# Patient Record
Sex: FEMALE | Race: WHITE | NOT HISPANIC OR LATINO | Employment: OTHER | URBAN - METROPOLITAN AREA
[De-identification: names, ages, dates, MRNs, and addresses within clinical notes are randomized per-mention and may not be internally consistent; named-entity substitution may affect disease eponyms.]

---

## 2020-11-11 PROCEDURE — 88305 TISSUE EXAM BY PATHOLOGIST: CPT | Performed by: OTOLARYNGOLOGY

## 2020-11-12 ENCOUNTER — LAB REQUISITION (OUTPATIENT)
Dept: LAB | Facility: HOSPITAL | Age: 75
End: 2020-11-12

## 2020-11-12 DIAGNOSIS — Z00.00 ENCOUNTER FOR GENERAL ADULT MEDICAL EXAMINATION WITHOUT ABNORMAL FINDINGS: ICD-10-CM

## 2020-11-13 LAB
LAB AP CASE REPORT: NORMAL
LAB AP CLINICAL INFORMATION: NORMAL
PATH REPORT.FINAL DX SPEC: NORMAL
PATH REPORT.GROSS SPEC: NORMAL

## 2021-05-20 ENCOUNTER — TRANSCRIBE ORDERS (OUTPATIENT)
Dept: ADMINISTRATIVE | Facility: HOSPITAL | Age: 76
End: 2021-05-20

## 2021-05-20 ENCOUNTER — APPOINTMENT (OUTPATIENT)
Dept: VASCULAR SURGERY | Facility: HOSPITAL | Age: 76
End: 2021-05-20

## 2021-05-20 DIAGNOSIS — I65.29 CAROTID ARTERY STENOSIS, UNILATERAL: ICD-10-CM

## 2021-05-20 DIAGNOSIS — I77.1 SUBCLAVIAN ARTERY STENOSIS, LEFT (HCC): Primary | ICD-10-CM

## 2021-05-20 PROCEDURE — G0463 HOSPITAL OUTPT CLINIC VISIT: HCPCS

## 2021-06-01 ENCOUNTER — HOSPITAL ENCOUNTER (OUTPATIENT)
Dept: CARDIOLOGY | Facility: HOSPITAL | Age: 76
Discharge: HOME | End: 2021-06-01

## 2021-06-01 ENCOUNTER — HOSPITAL ENCOUNTER (OUTPATIENT)
Dept: CT IMAGING | Facility: HOSPITAL | Age: 76
Discharge: HOME | End: 2021-06-01

## 2021-06-01 DIAGNOSIS — I65.29 CAROTID ARTERY STENOSIS, UNILATERAL: ICD-10-CM

## 2021-06-01 DIAGNOSIS — I77.1 SUBCLAVIAN ARTERY STENOSIS, LEFT (HCC): ICD-10-CM

## 2021-06-01 LAB
BH CV XLRA MEAS LEFT BULB EDV: -27.7 CM/SEC
BH CV XLRA MEAS LEFT BULB PSV: -246 CM/SEC
BH CV XLRA MEAS LEFT CCA RATIO VEL: 220 CM/SEC
BH CV XLRA MEAS LEFT DIST CCA EDV: 19.5 CM/SEC
BH CV XLRA MEAS LEFT DIST CCA PSV: 200 CM/SEC
BH CV XLRA MEAS LEFT DIST ICA EDV: -32.9 CM/SEC
BH CV XLRA MEAS LEFT DIST ICA PSV: -148 CM/SEC
BH CV XLRA MEAS LEFT ICA RATIO VEL: -307 CM/SEC
BH CV XLRA MEAS LEFT ICA/CCA RATIO: -1.4
BH CV XLRA MEAS LEFT PROX CCA EDV: 22 CM/SEC
BH CV XLRA MEAS LEFT PROX CCA PSV: 220 CM/SEC
BH CV XLRA MEAS LEFT PROX ECA PSV: -192 CM/SEC
BH CV XLRA MEAS LEFT PROX ICA EDV: -39.5 CM/SEC
BH CV XLRA MEAS LEFT PROX ICA PSV: -307 CM/SEC
BH CV XLRA MEAS LEFT PROX SCLA PSV: 168 CM/SEC
BH CV XLRA MEAS LEFT VERTEBRAL A PSV: -51.7 CM/SEC
BH CV XLRA MEAS RIGHT CCA RATIO VEL: 85.1 CM/SEC
BH CV XLRA MEAS RIGHT DIST CCA EDV: -11.5 CM/SEC
BH CV XLRA MEAS RIGHT DIST CCA PSV: -63.1 CM/SEC
BH CV XLRA MEAS RIGHT DIST ICA EDV: -16.8 CM/SEC
BH CV XLRA MEAS RIGHT DIST ICA PSV: -72.2 CM/SEC
BH CV XLRA MEAS RIGHT ICA RATIO VEL: -299 CM/SEC
BH CV XLRA MEAS RIGHT ICA/CCA RATIO: -3.5
BH CV XLRA MEAS RIGHT PROX CCA EDV: 13.7 CM/SEC
BH CV XLRA MEAS RIGHT PROX CCA PSV: 85.1 CM/SEC
BH CV XLRA MEAS RIGHT PROX ECA EDV: -63.1 CM/SEC
BH CV XLRA MEAS RIGHT PROX ECA PSV: -562 CM/SEC
BH CV XLRA MEAS RIGHT PROX ICA EDV: -54.9 CM/SEC
BH CV XLRA MEAS RIGHT PROX ICA PSV: -299 CM/SEC
BH CV XLRA MEAS RIGHT PROX SCLA EDV: 0 CM/SEC
BH CV XLRA MEAS RIGHT PROX SCLA PSV: 294 CM/SEC
BH CV XLRA MEAS RIGHT VERTEBRAL A EDV: -8.8 CM/SEC
BH CV XLRA MEAS RIGHT VERTEBRAL A PSV: -60.6 CM/SEC
CREAT BLDA-MCNC: 0.7 MG/DL (ref 0.6–1.3)
LEFT ARM BP: NORMAL MMHG
MAXIMAL PREDICTED HEART RATE: 144 BPM
RIGHT ARM BP: NORMAL MMHG
STRESS TARGET HR: 122 BPM

## 2021-06-01 PROCEDURE — 93880 EXTRACRANIAL BILAT STUDY: CPT

## 2021-06-01 PROCEDURE — 73206 CT ANGIO UPR EXTRM W/O&W/DYE: CPT

## 2021-06-01 PROCEDURE — 0 IOPAMIDOL PER 1 ML: Performed by: SURGERY

## 2021-06-01 PROCEDURE — 82565 ASSAY OF CREATININE: CPT

## 2021-06-01 RX ADMIN — IOPAMIDOL 100 ML: 755 INJECTION, SOLUTION INTRAVENOUS at 13:57

## 2021-06-08 ENCOUNTER — TELEPHONE (OUTPATIENT)
Dept: SURGERY | Facility: CLINIC | Age: 76
End: 2021-06-08

## 2021-06-08 NOTE — TELEPHONE ENCOUNTER
This nurse called patient to see if they were going to make their new patient appointment with [facility]. Patient stated that she needed to cancel it and will be seeking care from a different doctor relating to a diagnosis change. Will set up new appointment if need for patient.

## 2021-06-10 ENCOUNTER — APPOINTMENT (OUTPATIENT)
Dept: VASCULAR SURGERY | Facility: HOSPITAL | Age: 76
End: 2021-06-10

## 2021-06-10 PROCEDURE — G0463 HOSPITAL OUTPT CLINIC VISIT: HCPCS

## 2021-06-18 ENCOUNTER — HOSPITAL ENCOUNTER (OUTPATIENT)
Dept: CARDIOLOGY | Facility: HOSPITAL | Age: 76
Discharge: HOME | End: 2021-06-18

## 2021-06-18 ENCOUNTER — HOSPITAL ENCOUNTER (OUTPATIENT)
Dept: GENERAL RADIOLOGY | Facility: HOSPITAL | Age: 76
Discharge: HOME | End: 2021-06-18

## 2021-06-18 ENCOUNTER — LAB (OUTPATIENT)
Dept: LAB | Facility: HOSPITAL | Age: 76
End: 2021-06-18

## 2021-06-18 LAB
ABO GROUP BLD: NORMAL
ANION GAP SERPL CALCULATED.3IONS-SCNC: 13.5 MMOL/L (ref 5–15)
APTT PPP: 23.1 SECONDS (ref 24–31)
BLD GP AB SCN SERPL QL: NEGATIVE
BUN SERPL-MCNC: 7 MG/DL (ref 8–23)
BUN/CREAT SERPL: 9.7 (ref 7–25)
CALCIUM SPEC-SCNC: 9.3 MG/DL (ref 8.6–10.5)
CHLORIDE SERPL-SCNC: 99 MMOL/L (ref 98–107)
CO2 SERPL-SCNC: 21.5 MMOL/L (ref 22–29)
CREAT SERPL-MCNC: 0.72 MG/DL (ref 0.57–1)
DEPRECATED RDW RBC AUTO: 44.1 FL (ref 37–54)
ERYTHROCYTE [DISTWIDTH] IN BLOOD BY AUTOMATED COUNT: 13.2 % (ref 12.3–15.4)
GFR SERPL CREATININE-BSD FRML MDRD: 79 ML/MIN/1.73
GLUCOSE SERPL-MCNC: 86 MG/DL (ref 65–99)
HCT VFR BLD AUTO: 34.7 % (ref 34–46.6)
HGB BLD-MCNC: 11.4 G/DL (ref 12–15.9)
INR PPP: 1.03 (ref 0.93–1.1)
MCH RBC QN AUTO: 30 PG (ref 26.6–33)
MCHC RBC AUTO-ENTMCNC: 32.9 G/DL (ref 31.5–35.7)
MCV RBC AUTO: 91.3 FL (ref 79–97)
PLATELET # BLD AUTO: 419 10*3/MM3 (ref 140–450)
PMV BLD AUTO: 10.5 FL (ref 6–12)
POTASSIUM SERPL-SCNC: 4.9 MMOL/L (ref 3.5–5.2)
PROTHROMBIN TIME: 11.4 SECONDS (ref 9.6–11.7)
QT INTERVAL: 428 MS
RBC # BLD AUTO: 3.8 10*6/MM3 (ref 3.77–5.28)
RH BLD: POSITIVE
SODIUM SERPL-SCNC: 134 MMOL/L (ref 136–145)
T&S EXPIRATION DATE: NORMAL
WBC # BLD AUTO: 8.51 10*3/MM3 (ref 3.4–10.8)

## 2021-06-18 PROCEDURE — 85610 PROTHROMBIN TIME: CPT

## 2021-06-18 PROCEDURE — 93010 ELECTROCARDIOGRAM REPORT: CPT | Performed by: INTERNAL MEDICINE

## 2021-06-18 PROCEDURE — 86901 BLOOD TYPING SEROLOGIC RH(D): CPT

## 2021-06-18 PROCEDURE — 86900 BLOOD TYPING SEROLOGIC ABO: CPT

## 2021-06-18 PROCEDURE — 85730 THROMBOPLASTIN TIME PARTIAL: CPT

## 2021-06-18 PROCEDURE — 71046 X-RAY EXAM CHEST 2 VIEWS: CPT

## 2021-06-18 PROCEDURE — 86850 RBC ANTIBODY SCREEN: CPT

## 2021-06-18 PROCEDURE — 93005 ELECTROCARDIOGRAM TRACING: CPT | Performed by: SURGERY

## 2021-06-18 PROCEDURE — 85027 COMPLETE CBC AUTOMATED: CPT

## 2021-06-18 PROCEDURE — 80048 BASIC METABOLIC PNL TOTAL CA: CPT

## 2021-06-22 RX ORDER — PHENOL 1.4 %
600 AEROSOL, SPRAY (ML) MUCOUS MEMBRANE DAILY
Status: ON HOLD | COMMUNITY
End: 2022-08-16

## 2021-06-22 RX ORDER — ALBUTEROL SULFATE 90 UG/1
2 AEROSOL, METERED RESPIRATORY (INHALATION) EVERY 4 HOURS PRN
COMMUNITY

## 2021-06-22 RX ORDER — LOSARTAN POTASSIUM 25 MG/1
25 TABLET ORAL EVERY MORNING
COMMUNITY

## 2021-06-22 RX ORDER — EZETIMIBE 10 MG/1
10 TABLET ORAL
COMMUNITY

## 2021-06-22 RX ORDER — TOCILIZUMAB 180 MG/ML
1 INJECTION, SOLUTION SUBCUTANEOUS
Status: ON HOLD | COMMUNITY
End: 2022-08-18 | Stop reason: SDUPTHER

## 2021-06-22 RX ORDER — LEVOTHYROXINE SODIUM 0.03 MG/1
25 TABLET ORAL EVERY MORNING
COMMUNITY

## 2021-06-22 RX ORDER — ASPIRIN 81 MG/1
81 TABLET ORAL DAILY
COMMUNITY

## 2021-06-22 RX ORDER — HYDRALAZINE HYDROCHLORIDE 100 MG/1
100 TABLET, FILM COATED ORAL 3 TIMES DAILY
COMMUNITY

## 2021-06-22 RX ORDER — FLUTICASONE PROPIONATE 50 MCG
2 SPRAY, SUSPENSION (ML) NASAL EVERY MORNING
COMMUNITY

## 2021-06-22 RX ORDER — MULTIVIT-MIN/IRON/FOLIC ACID/K 18-600-40
2000 CAPSULE ORAL DAILY
COMMUNITY

## 2021-06-22 RX ORDER — FLUCONAZOLE 200 MG/1
200 TABLET ORAL
COMMUNITY
End: 2021-07-18

## 2021-06-22 RX ORDER — LOTEPREDNOL ETABONATE 5 MG/ML
1 SUSPENSION/ DROPS OPHTHALMIC
Status: ON HOLD | COMMUNITY
End: 2022-08-16

## 2021-06-22 RX ORDER — GABAPENTIN 300 MG/1
300 CAPSULE ORAL 3 TIMES DAILY
COMMUNITY
End: 2021-07-22 | Stop reason: HOSPADM

## 2021-06-22 RX ORDER — SIMVASTATIN 40 MG
40 TABLET ORAL NIGHTLY
COMMUNITY

## 2021-06-22 RX ORDER — ESCITALOPRAM OXALATE 10 MG/1
10 TABLET ORAL EVERY MORNING
COMMUNITY

## 2021-06-22 RX ORDER — OMEPRAZOLE 20 MG/1
20 CAPSULE, DELAYED RELEASE ORAL EVERY MORNING
Status: ON HOLD | COMMUNITY
End: 2022-08-16

## 2021-06-22 RX ORDER — MONTELUKAST SODIUM 10 MG/1
10 TABLET ORAL NIGHTLY
COMMUNITY

## 2021-06-22 RX ORDER — BUDESONIDE AND FORMOTEROL FUMARATE DIHYDRATE 160; 4.5 UG/1; UG/1
2 AEROSOL RESPIRATORY (INHALATION)
COMMUNITY

## 2021-06-22 RX ORDER — CYCLOSPORINE 0.5 MG/ML
1 EMULSION OPHTHALMIC 2 TIMES DAILY
COMMUNITY

## 2021-06-22 RX ORDER — DIGOXIN 250 MCG
250 TABLET ORAL
COMMUNITY

## 2021-06-22 RX ORDER — FUROSEMIDE 20 MG/1
10 TABLET ORAL EVERY MORNING
COMMUNITY

## 2021-06-22 RX ORDER — AZELASTINE 1 MG/ML
1 SPRAY, METERED NASAL 2 TIMES DAILY
COMMUNITY

## 2021-06-22 RX ORDER — METOPROLOL SUCCINATE 50 MG/1
50 TABLET, EXTENDED RELEASE ORAL EVERY MORNING
COMMUNITY

## 2021-06-22 RX ORDER — NIFEDIPINE 30 MG/1
60 TABLET, EXTENDED RELEASE ORAL 2 TIMES DAILY
COMMUNITY

## 2021-06-28 ENCOUNTER — LAB (OUTPATIENT)
Dept: LAB | Facility: HOSPITAL | Age: 76
End: 2021-06-28

## 2021-06-28 LAB — SARS-COV-2 ORF1AB RESP QL NAA+PROBE: NOT DETECTED

## 2021-06-28 PROCEDURE — C9803 HOPD COVID-19 SPEC COLLECT: HCPCS

## 2021-06-28 PROCEDURE — U0005 INFEC AGEN DETEC AMPLI PROBE: HCPCS

## 2021-06-28 PROCEDURE — U0004 COV-19 TEST NON-CDC HGH THRU: HCPCS

## 2021-06-29 ENCOUNTER — ANESTHESIA EVENT (OUTPATIENT)
Dept: PERIOP | Facility: HOSPITAL | Age: 76
End: 2021-06-29

## 2021-06-30 ENCOUNTER — HOSPITAL ENCOUNTER (INPATIENT)
Facility: HOSPITAL | Age: 76
LOS: 5 days | Discharge: HOME HEALTH | End: 2021-07-05
Attending: SURGERY | Admitting: SURGERY

## 2021-06-30 ENCOUNTER — ANESTHESIA (OUTPATIENT)
Dept: PERIOP | Facility: HOSPITAL | Age: 76
End: 2021-06-30

## 2021-06-30 DIAGNOSIS — J44.9 CHRONIC OBSTRUCTIVE PULMONARY DISEASE, UNSPECIFIED COPD TYPE (HCC): ICD-10-CM

## 2021-06-30 DIAGNOSIS — G45.8 SUBCLAVIAN STEAL SYNDROME OF LEFT SUBCLAVIAN ARTERY: Primary | ICD-10-CM

## 2021-06-30 PROBLEM — G62.9 PERIPHERAL NEUROPATHY: Chronic | Status: ACTIVE | Noted: 2021-06-30

## 2021-06-30 PROBLEM — I10 ESSENTIAL HYPERTENSION: Status: ACTIVE | Noted: 2021-06-30

## 2021-06-30 PROBLEM — I48.91 ATRIAL FIBRILLATION (HCC): Chronic | Status: ACTIVE | Noted: 2021-06-30

## 2021-06-30 PROBLEM — J45.909 ASTHMA: Status: ACTIVE | Noted: 2021-06-30

## 2021-06-30 PROBLEM — I48.91 ATRIAL FIBRILLATION (HCC): Status: ACTIVE | Noted: 2021-06-30

## 2021-06-30 PROBLEM — M35.3 POLYMYALGIA (HCC): Status: ACTIVE | Noted: 2021-06-30

## 2021-06-30 PROBLEM — F41.9 ANXIETY: Status: ACTIVE | Noted: 2021-06-30

## 2021-06-30 PROBLEM — E78.5 HYPERLIPEMIA: Status: ACTIVE | Noted: 2021-06-30

## 2021-06-30 PROBLEM — I65.29 STENOSIS OF CAROTID ARTERY: Status: ACTIVE | Noted: 2021-06-30

## 2021-06-30 PROBLEM — K21.9 GERD (GASTROESOPHAGEAL REFLUX DISEASE): Status: ACTIVE | Noted: 2021-06-30

## 2021-06-30 PROBLEM — F32.A DEPRESSION: Status: ACTIVE | Noted: 2021-06-30

## 2021-06-30 PROBLEM — E03.9 HYPOTHYROID: Chronic | Status: ACTIVE | Noted: 2021-06-30

## 2021-06-30 PROBLEM — K21.9 GERD (GASTROESOPHAGEAL REFLUX DISEASE): Chronic | Status: ACTIVE | Noted: 2021-06-30

## 2021-06-30 PROBLEM — F41.9 ANXIETY: Chronic | Status: ACTIVE | Noted: 2021-06-30

## 2021-06-30 PROBLEM — I10 ESSENTIAL HYPERTENSION: Chronic | Status: ACTIVE | Noted: 2021-06-30

## 2021-06-30 LAB
ACT BLD: 114 SECONDS (ref 89–137)
ACT BLD: 114 SECONDS (ref 89–137)
ACT BLD: 186 SECONDS (ref 89–137)
ACT BLD: 230 SECONDS (ref 89–137)
ALBUMIN SERPL-MCNC: 4 G/DL (ref 3.5–5.2)
ALBUMIN/GLOB SERPL: 2.4 G/DL
ALP SERPL-CCNC: 29 U/L (ref 39–117)
ALT SERPL W P-5'-P-CCNC: 93 U/L (ref 1–33)
ANION GAP SERPL CALCULATED.3IONS-SCNC: 12 MMOL/L (ref 5–15)
AST SERPL-CCNC: 148 U/L (ref 1–32)
BASOPHILS # BLD AUTO: 0 10*3/MM3 (ref 0–0.2)
BASOPHILS NFR BLD AUTO: 0.4 % (ref 0–1.5)
BILIRUB SERPL-MCNC: 0.4 MG/DL (ref 0–1.2)
BUN SERPL-MCNC: 9 MG/DL (ref 8–23)
BUN/CREAT SERPL: 12.3 (ref 7–25)
CALCIUM SPEC-SCNC: 8.5 MG/DL (ref 8.6–10.5)
CHLORIDE SERPL-SCNC: 99 MMOL/L (ref 98–107)
CO2 SERPL-SCNC: 23 MMOL/L (ref 22–29)
CREAT SERPL-MCNC: 0.73 MG/DL (ref 0.57–1)
DEPRECATED RDW RBC AUTO: 42.4 FL (ref 37–54)
EOSINOPHIL # BLD AUTO: 0 10*3/MM3 (ref 0–0.4)
EOSINOPHIL NFR BLD AUTO: 0 % (ref 0.3–6.2)
ERYTHROCYTE [DISTWIDTH] IN BLOOD BY AUTOMATED COUNT: 13.8 % (ref 12.3–15.4)
GFR SERPL CREATININE-BSD FRML MDRD: 78 ML/MIN/1.73
GLOBULIN UR ELPH-MCNC: 1.7 GM/DL
GLUCOSE SERPL-MCNC: 168 MG/DL (ref 65–99)
HCT VFR BLD AUTO: 29.7 % (ref 34–46.6)
HGB BLD-MCNC: 10.1 G/DL (ref 12–15.9)
LYMPHOCYTES # BLD AUTO: 0.7 10*3/MM3 (ref 0.7–3.1)
LYMPHOCYTES NFR BLD AUTO: 6.7 % (ref 19.6–45.3)
MAGNESIUM SERPL-MCNC: 1.4 MG/DL (ref 1.6–2.4)
MCH RBC QN AUTO: 29.6 PG (ref 26.6–33)
MCHC RBC AUTO-ENTMCNC: 33.9 G/DL (ref 31.5–35.7)
MCV RBC AUTO: 87.2 FL (ref 79–97)
MONOCYTES # BLD AUTO: 0.2 10*3/MM3 (ref 0.1–0.9)
MONOCYTES NFR BLD AUTO: 1.7 % (ref 5–12)
NEUTROPHILS NFR BLD AUTO: 9.2 10*3/MM3 (ref 1.7–7)
NEUTROPHILS NFR BLD AUTO: 91.2 % (ref 42.7–76)
NRBC BLD AUTO-RTO: 0.1 /100 WBC (ref 0–0.2)
PHOSPHATE SERPL-MCNC: 4.2 MG/DL (ref 2.5–4.5)
PLATELET # BLD AUTO: 262 10*3/MM3 (ref 140–450)
PMV BLD AUTO: 7.8 FL (ref 6–12)
POTASSIUM SERPL-SCNC: 4.4 MMOL/L (ref 3.5–5.2)
PROT SERPL-MCNC: 5.7 G/DL (ref 6–8.5)
RBC # BLD AUTO: 3.4 10*6/MM3 (ref 3.77–5.28)
SODIUM SERPL-SCNC: 134 MMOL/L (ref 136–145)
WBC # BLD AUTO: 10.1 10*3/MM3 (ref 3.4–10.8)

## 2021-06-30 PROCEDURE — 80053 COMPREHEN METABOLIC PANEL: CPT | Performed by: NURSE PRACTITIONER

## 2021-06-30 PROCEDURE — 25010000002 NEOSTIGMINE 5 MG/5ML SOLUTION: Performed by: NURSE ANESTHETIST, CERTIFIED REGISTERED

## 2021-06-30 PROCEDURE — 94799 UNLISTED PULMONARY SVC/PX: CPT

## 2021-06-30 PROCEDURE — C1889 IMPLANT/INSERT DEVICE, NOC: HCPCS | Performed by: SURGERY

## 2021-06-30 PROCEDURE — 25010000002 FENTANYL CITRATE (PF) 100 MCG/2ML SOLUTION: Performed by: NURSE ANESTHETIST, CERTIFIED REGISTERED

## 2021-06-30 PROCEDURE — 25010000002 HEPARIN (PORCINE) PER 1000 UNITS: Performed by: SURGERY

## 2021-06-30 PROCEDURE — 83735 ASSAY OF MAGNESIUM: CPT | Performed by: NURSE PRACTITIONER

## 2021-06-30 PROCEDURE — 25010000002 PROTAMINE SULFATE PER 10 MG: Performed by: NURSE ANESTHETIST, CERTIFIED REGISTERED

## 2021-06-30 PROCEDURE — 84100 ASSAY OF PHOSPHORUS: CPT | Performed by: NURSE PRACTITIONER

## 2021-06-30 PROCEDURE — 25010000002 ONDANSETRON PER 1 MG: Performed by: SURGERY

## 2021-06-30 PROCEDURE — 25010000002 HEPARIN (PORCINE) PER 1000 UNITS: Performed by: NURSE ANESTHETIST, CERTIFIED REGISTERED

## 2021-06-30 PROCEDURE — 85347 COAGULATION TIME ACTIVATED: CPT

## 2021-06-30 PROCEDURE — 25010000002 MAGNESIUM SULFATE 2 GM/50ML SOLUTION: Performed by: NURSE PRACTITIONER

## 2021-06-30 PROCEDURE — 85025 COMPLETE CBC W/AUTO DIFF WBC: CPT | Performed by: NURSE PRACTITIONER

## 2021-06-30 PROCEDURE — 25010000002 MORPHINE PER 10 MG: Performed by: SURGERY

## 2021-06-30 PROCEDURE — 25010000002 VANCOMYCIN 1 G RECONSTITUTED SOLUTION 1 EACH VIAL: Performed by: SURGERY

## 2021-06-30 PROCEDURE — 25010000002 PHENYLEPHRINE 10 MG/ML SOLUTION 5 ML VIAL: Performed by: NURSE ANESTHETIST, CERTIFIED REGISTERED

## 2021-06-30 PROCEDURE — C1768 GRAFT, VASCULAR: HCPCS | Performed by: SURGERY

## 2021-06-30 PROCEDURE — 25010000002 ONDANSETRON PER 1 MG: Performed by: NURSE ANESTHETIST, CERTIFIED REGISTERED

## 2021-06-30 PROCEDURE — 25010000002 FENTANYL CITRATE (PF) 50 MCG/ML SOLUTION: Performed by: NURSE ANESTHETIST, CERTIFIED REGISTERED

## 2021-06-30 PROCEDURE — 25010000002 PROPOFOL 10 MG/ML EMULSION: Performed by: NURSE ANESTHETIST, CERTIFIED REGISTERED

## 2021-06-30 PROCEDURE — 031J0JY BYPASS LEFT COMMON CAROTID ARTERY TO UPPER ARTERY WITH SYNTHETIC SUBSTITUTE, OPEN APPROACH: ICD-10-PCS | Performed by: SURGERY

## 2021-06-30 PROCEDURE — 25010000002 DEXAMETHASONE PER 1 MG: Performed by: NURSE ANESTHETIST, CERTIFIED REGISTERED

## 2021-06-30 DEVICE — LIGACLIP MCA MULTIPLE CLIP APPLIERS, 20 SMALL CLIPS
Type: IMPLANTABLE DEVICE | Site: CAROTID | Status: FUNCTIONAL
Brand: LIGACLIP

## 2021-06-30 DEVICE — FLOSEAL HEMOSTATIC MATRIX, 5ML
Type: IMPLANTABLE DEVICE | Site: NECK | Status: FUNCTIONAL
Brand: FLOSEAL HEMOSTATIC MATRIX

## 2021-06-30 DEVICE — LIGACLIP MCA MULTIPLE CLIP APPLIERS, 20 MEDIUM CLIPS
Type: IMPLANTABLE DEVICE | Site: CAROTID | Status: FUNCTIONAL
Brand: LIGACLIP

## 2021-06-30 DEVICE — GRFT VASC PROPAT THNSTRCH REMVRNG6X50X40: Type: IMPLANTABLE DEVICE | Site: CAROTID | Status: FUNCTIONAL

## 2021-06-30 RX ORDER — OXYCODONE HYDROCHLORIDE 5 MG/1
5 TABLET ORAL ONCE AS NEEDED
Status: COMPLETED | OUTPATIENT
Start: 2021-06-30 | End: 2021-06-30

## 2021-06-30 RX ORDER — ALBUTEROL SULFATE 2.5 MG/3ML
SOLUTION RESPIRATORY (INHALATION) AS NEEDED
Status: DISCONTINUED | OUTPATIENT
Start: 2021-06-30 | End: 2021-06-30 | Stop reason: SURG

## 2021-06-30 RX ORDER — NALOXONE HCL 0.4 MG/ML
0.4 VIAL (ML) INJECTION
Status: DISCONTINUED | OUTPATIENT
Start: 2021-06-30 | End: 2021-07-05 | Stop reason: HOSPADM

## 2021-06-30 RX ORDER — DEXAMETHASONE SODIUM PHOSPHATE 4 MG/ML
INJECTION, SOLUTION INTRA-ARTICULAR; INTRALESIONAL; INTRAMUSCULAR; INTRAVENOUS; SOFT TISSUE AS NEEDED
Status: DISCONTINUED | OUTPATIENT
Start: 2021-06-30 | End: 2021-06-30 | Stop reason: SURG

## 2021-06-30 RX ORDER — GLYCOPYRROLATE 1 MG/5 ML
SYRINGE (ML) INTRAVENOUS AS NEEDED
Status: DISCONTINUED | OUTPATIENT
Start: 2021-06-30 | End: 2021-06-30 | Stop reason: SURG

## 2021-06-30 RX ORDER — LIDOCAINE HYDROCHLORIDE 20 MG/ML
INJECTION, SOLUTION EPIDURAL; INFILTRATION; INTRACAUDAL; PERINEURAL AS NEEDED
Status: DISCONTINUED | OUTPATIENT
Start: 2021-06-30 | End: 2021-06-30 | Stop reason: SURG

## 2021-06-30 RX ORDER — MORPHINE SULFATE 4 MG/ML
2 INJECTION, SOLUTION INTRAMUSCULAR; INTRAVENOUS EVERY 4 HOURS PRN
Status: DISCONTINUED | OUTPATIENT
Start: 2021-06-30 | End: 2021-07-05 | Stop reason: HOSPADM

## 2021-06-30 RX ORDER — HEPARIN SODIUM 1000 [USP'U]/ML
INJECTION, SOLUTION INTRAVENOUS; SUBCUTANEOUS AS NEEDED
Status: DISCONTINUED | OUTPATIENT
Start: 2021-06-30 | End: 2021-06-30 | Stop reason: SURG

## 2021-06-30 RX ORDER — ONDANSETRON 2 MG/ML
INJECTION INTRAMUSCULAR; INTRAVENOUS AS NEEDED
Status: DISCONTINUED | OUTPATIENT
Start: 2021-06-30 | End: 2021-06-30 | Stop reason: SURG

## 2021-06-30 RX ORDER — ACETAMINOPHEN 325 MG/1
650 TABLET ORAL ONCE AS NEEDED
Status: DISCONTINUED | OUTPATIENT
Start: 2021-06-30 | End: 2021-06-30

## 2021-06-30 RX ORDER — PROPOFOL 10 MG/ML
VIAL (ML) INTRAVENOUS AS NEEDED
Status: DISCONTINUED | OUTPATIENT
Start: 2021-06-30 | End: 2021-06-30 | Stop reason: SURG

## 2021-06-30 RX ORDER — ALBUTEROL SULFATE 90 UG/1
2 AEROSOL, METERED RESPIRATORY (INHALATION) EVERY 4 HOURS PRN
Status: DISCONTINUED | OUTPATIENT
Start: 2021-06-30 | End: 2021-07-05 | Stop reason: HOSPADM

## 2021-06-30 RX ORDER — KETAMINE HYDROCHLORIDE 10 MG/ML
INJECTION INTRAMUSCULAR; INTRAVENOUS AS NEEDED
Status: DISCONTINUED | OUTPATIENT
Start: 2021-06-30 | End: 2021-06-30 | Stop reason: SURG

## 2021-06-30 RX ORDER — FENTANYL CITRATE 50 UG/ML
25 INJECTION, SOLUTION INTRAMUSCULAR; INTRAVENOUS
Status: DISCONTINUED | OUTPATIENT
Start: 2021-06-30 | End: 2021-06-30

## 2021-06-30 RX ORDER — ALUMINA, MAGNESIA, AND SIMETHICONE 2400; 2400; 240 MG/30ML; MG/30ML; MG/30ML
15 SUSPENSION ORAL EVERY 6 HOURS PRN
Status: DISCONTINUED | OUTPATIENT
Start: 2021-06-30 | End: 2021-07-02

## 2021-06-30 RX ORDER — ONDANSETRON 2 MG/ML
4 INJECTION INTRAMUSCULAR; INTRAVENOUS ONCE AS NEEDED
Status: DISCONTINUED | OUTPATIENT
Start: 2021-06-30 | End: 2021-06-30

## 2021-06-30 RX ORDER — HYDROCODONE BITARTRATE AND ACETAMINOPHEN 5; 325 MG/1; MG/1
1 TABLET ORAL EVERY 4 HOURS PRN
Status: DISCONTINUED | OUTPATIENT
Start: 2021-06-30 | End: 2021-07-05 | Stop reason: HOSPADM

## 2021-06-30 RX ORDER — MAGNESIUM SULFATE 1 G/100ML
1 INJECTION INTRAVENOUS AS NEEDED
Status: DISCONTINUED | OUTPATIENT
Start: 2021-06-30 | End: 2021-07-05 | Stop reason: HOSPADM

## 2021-06-30 RX ORDER — ACETAMINOPHEN 325 MG/1
650 TABLET ORAL EVERY 4 HOURS PRN
Status: DISCONTINUED | OUTPATIENT
Start: 2021-06-30 | End: 2021-07-05 | Stop reason: HOSPADM

## 2021-06-30 RX ORDER — ATORVASTATIN CALCIUM 10 MG/1
10 TABLET, FILM COATED ORAL NIGHTLY
Status: DISCONTINUED | OUTPATIENT
Start: 2021-06-30 | End: 2021-07-01

## 2021-06-30 RX ORDER — SODIUM CHLORIDE 0.9 % (FLUSH) 0.9 %
10 SYRINGE (ML) INJECTION AS NEEDED
Status: DISCONTINUED | OUTPATIENT
Start: 2021-06-30 | End: 2021-06-30 | Stop reason: HOSPADM

## 2021-06-30 RX ORDER — ROCURONIUM BROMIDE 10 MG/ML
INJECTION, SOLUTION INTRAVENOUS AS NEEDED
Status: DISCONTINUED | OUTPATIENT
Start: 2021-06-30 | End: 2021-06-30 | Stop reason: SURG

## 2021-06-30 RX ORDER — BUDESONIDE AND FORMOTEROL FUMARATE DIHYDRATE 160; 4.5 UG/1; UG/1
2 AEROSOL RESPIRATORY (INHALATION)
Status: DISCONTINUED | OUTPATIENT
Start: 2021-06-30 | End: 2021-07-05 | Stop reason: HOSPADM

## 2021-06-30 RX ORDER — PROTAMINE SULFATE 10 MG/ML
INJECTION, SOLUTION INTRAVENOUS AS NEEDED
Status: DISCONTINUED | OUTPATIENT
Start: 2021-06-30 | End: 2021-06-30 | Stop reason: SURG

## 2021-06-30 RX ORDER — FUROSEMIDE 20 MG/1
10 TABLET ORAL EVERY MORNING
Status: DISCONTINUED | OUTPATIENT
Start: 2021-06-30 | End: 2021-07-02

## 2021-06-30 RX ORDER — SODIUM CHLORIDE, SODIUM LACTATE, POTASSIUM CHLORIDE, CALCIUM CHLORIDE 600; 310; 30; 20 MG/100ML; MG/100ML; MG/100ML; MG/100ML
1000 INJECTION, SOLUTION INTRAVENOUS CONTINUOUS
Status: DISCONTINUED | OUTPATIENT
Start: 2021-06-30 | End: 2021-06-30

## 2021-06-30 RX ORDER — SODIUM CHLORIDE, SODIUM LACTATE, POTASSIUM CHLORIDE, CALCIUM CHLORIDE 600; 310; 30; 20 MG/100ML; MG/100ML; MG/100ML; MG/100ML
50 INJECTION, SOLUTION INTRAVENOUS CONTINUOUS
Status: DISCONTINUED | OUTPATIENT
Start: 2021-06-30 | End: 2021-07-02

## 2021-06-30 RX ORDER — MAGNESIUM SULFATE HEPTAHYDRATE 40 MG/ML
2 INJECTION, SOLUTION INTRAVENOUS AS NEEDED
Status: DISCONTINUED | OUTPATIENT
Start: 2021-06-30 | End: 2021-07-05 | Stop reason: HOSPADM

## 2021-06-30 RX ORDER — LABETALOL HYDROCHLORIDE 5 MG/ML
10 INJECTION, SOLUTION INTRAVENOUS EVERY 4 HOURS PRN
Status: DISCONTINUED | OUTPATIENT
Start: 2021-06-30 | End: 2021-07-05 | Stop reason: HOSPADM

## 2021-06-30 RX ORDER — NEOSTIGMINE METHYLSULFATE 5 MG/5 ML
SYRINGE (ML) INTRAVENOUS AS NEEDED
Status: DISCONTINUED | OUTPATIENT
Start: 2021-06-30 | End: 2021-06-30 | Stop reason: SURG

## 2021-06-30 RX ORDER — METOPROLOL SUCCINATE 50 MG/1
100 TABLET, EXTENDED RELEASE ORAL EVERY MORNING
Status: DISCONTINUED | OUTPATIENT
Start: 2021-07-01 | End: 2021-07-05 | Stop reason: HOSPADM

## 2021-06-30 RX ORDER — LOSARTAN POTASSIUM 50 MG/1
100 TABLET ORAL EVERY MORNING
Status: DISCONTINUED | OUTPATIENT
Start: 2021-07-01 | End: 2021-07-05 | Stop reason: HOSPADM

## 2021-06-30 RX ORDER — POTASSIUM CHLORIDE 1.5 G/1.77G
40 POWDER, FOR SOLUTION ORAL AS NEEDED
Status: DISCONTINUED | OUTPATIENT
Start: 2021-06-30 | End: 2021-07-05 | Stop reason: HOSPADM

## 2021-06-30 RX ORDER — NITROGLYCERIN 0.4 MG/1
0.4 TABLET SUBLINGUAL
Status: DISCONTINUED | OUTPATIENT
Start: 2021-06-30 | End: 2021-07-05 | Stop reason: HOSPADM

## 2021-06-30 RX ORDER — ASPIRIN 325 MG
325 TABLET ORAL DAILY
Status: DISCONTINUED | OUTPATIENT
Start: 2021-07-01 | End: 2021-07-05 | Stop reason: HOSPADM

## 2021-06-30 RX ORDER — HYDRALAZINE HYDROCHLORIDE 25 MG/1
100 TABLET, FILM COATED ORAL 3 TIMES DAILY
Status: DISCONTINUED | OUTPATIENT
Start: 2021-06-30 | End: 2021-07-05 | Stop reason: HOSPADM

## 2021-06-30 RX ORDER — POTASSIUM CHLORIDE 20 MEQ/1
40 TABLET, EXTENDED RELEASE ORAL AS NEEDED
Status: DISCONTINUED | OUTPATIENT
Start: 2021-06-30 | End: 2021-07-05 | Stop reason: HOSPADM

## 2021-06-30 RX ORDER — DIGOXIN 250 MCG
250 TABLET ORAL NIGHTLY
Status: DISCONTINUED | OUTPATIENT
Start: 2021-06-30 | End: 2021-07-05 | Stop reason: HOSPADM

## 2021-06-30 RX ORDER — ESCITALOPRAM OXALATE 10 MG/1
10 TABLET ORAL EVERY MORNING
Status: DISCONTINUED | OUTPATIENT
Start: 2021-07-01 | End: 2021-07-05 | Stop reason: HOSPADM

## 2021-06-30 RX ORDER — ACETAMINOPHEN 650 MG/1
650 SUPPOSITORY RECTAL ONCE AS NEEDED
Status: DISCONTINUED | OUTPATIENT
Start: 2021-06-30 | End: 2021-06-30

## 2021-06-30 RX ORDER — GABAPENTIN 300 MG/1
300 CAPSULE ORAL 3 TIMES DAILY
Status: DISCONTINUED | OUTPATIENT
Start: 2021-06-30 | End: 2021-07-05 | Stop reason: HOSPADM

## 2021-06-30 RX ORDER — EPHEDRINE SULFATE 50 MG/ML
INJECTION INTRAVENOUS AS NEEDED
Status: DISCONTINUED | OUTPATIENT
Start: 2021-06-30 | End: 2021-06-30 | Stop reason: SURG

## 2021-06-30 RX ORDER — ONDANSETRON 4 MG/1
4 TABLET, FILM COATED ORAL EVERY 6 HOURS PRN
Status: DISCONTINUED | OUTPATIENT
Start: 2021-06-30 | End: 2021-07-02

## 2021-06-30 RX ORDER — LEVOTHYROXINE SODIUM 0.03 MG/1
25 TABLET ORAL
Status: DISCONTINUED | OUTPATIENT
Start: 2021-07-01 | End: 2021-07-05 | Stop reason: HOSPADM

## 2021-06-30 RX ORDER — ONDANSETRON 2 MG/ML
4 INJECTION INTRAMUSCULAR; INTRAVENOUS EVERY 6 HOURS PRN
Status: DISCONTINUED | OUTPATIENT
Start: 2021-06-30 | End: 2021-07-02

## 2021-06-30 RX ORDER — PANTOPRAZOLE SODIUM 40 MG/1
40 TABLET, DELAYED RELEASE ORAL EVERY MORNING
Status: DISCONTINUED | OUTPATIENT
Start: 2021-07-01 | End: 2021-07-05 | Stop reason: HOSPADM

## 2021-06-30 RX ORDER — FENTANYL CITRATE 50 UG/ML
INJECTION, SOLUTION INTRAMUSCULAR; INTRAVENOUS AS NEEDED
Status: DISCONTINUED | OUTPATIENT
Start: 2021-06-30 | End: 2021-06-30 | Stop reason: SURG

## 2021-06-30 RX ORDER — POTASSIUM CHLORIDE 7.45 MG/ML
10 INJECTION INTRAVENOUS
Status: DISCONTINUED | OUTPATIENT
Start: 2021-06-30 | End: 2021-07-05 | Stop reason: HOSPADM

## 2021-06-30 RX ORDER — NIFEDIPINE 60 MG/1
60 TABLET, EXTENDED RELEASE ORAL
Status: DISCONTINUED | OUTPATIENT
Start: 2021-07-01 | End: 2021-07-05 | Stop reason: HOSPADM

## 2021-06-30 RX ORDER — MONTELUKAST SODIUM 10 MG/1
10 TABLET ORAL NIGHTLY
Status: DISCONTINUED | OUTPATIENT
Start: 2021-06-30 | End: 2021-07-05 | Stop reason: HOSPADM

## 2021-06-30 RX ADMIN — FENTANYL CITRATE 25 MCG: 50 INJECTION, SOLUTION INTRAMUSCULAR; INTRAVENOUS at 12:20

## 2021-06-30 RX ADMIN — DEXMEDETOMIDINE HYDROCHLORIDE 0.5 MCG/HR: 100 INJECTION, SOLUTION INTRAVENOUS at 09:35

## 2021-06-30 RX ADMIN — SODIUM CHLORIDE, POTASSIUM CHLORIDE, SODIUM LACTATE AND CALCIUM CHLORIDE 50 ML/HR: 600; 310; 30; 20 INJECTION, SOLUTION INTRAVENOUS at 12:49

## 2021-06-30 RX ADMIN — GABAPENTIN 300 MG: 300 CAPSULE ORAL at 15:00

## 2021-06-30 RX ADMIN — IPRATROPIUM BROMIDE 0.5 MG: 0.5 SOLUTION RESPIRATORY (INHALATION) at 18:59

## 2021-06-30 RX ADMIN — MORPHINE SULFATE 2 MG: 4 INJECTION INTRAVENOUS at 14:06

## 2021-06-30 RX ADMIN — VANCOMYCIN HYDROCHLORIDE 1000 MG: 1 INJECTION, POWDER, LYOPHILIZED, FOR SOLUTION INTRAVENOUS at 18:05

## 2021-06-30 RX ADMIN — DIGOXIN 250 MCG: 250 TABLET ORAL at 21:08

## 2021-06-30 RX ADMIN — MAGNESIUM SULFATE HEPTAHYDRATE 2 G: 40 INJECTION, SOLUTION INTRAVENOUS at 18:04

## 2021-06-30 RX ADMIN — Medication 1000 MG: at 08:53

## 2021-06-30 RX ADMIN — PROTAMINE SULFATE 50 MG: 10 INJECTION, SOLUTION INTRAVENOUS at 10:59

## 2021-06-30 RX ADMIN — BUDESONIDE AND FORMOTEROL FUMARATE DIHYDRATE 2 PUFF: 160; 4.5 AEROSOL RESPIRATORY (INHALATION) at 19:05

## 2021-06-30 RX ADMIN — Medication 0.6 MG: at 11:19

## 2021-06-30 RX ADMIN — KETAMINE HYDROCHLORIDE 15 MG: 10 INJECTION, SOLUTION INTRAMUSCULAR; INTRAVENOUS at 10:40

## 2021-06-30 RX ADMIN — FENTANYL CITRATE 25 MCG: 50 INJECTION, SOLUTION INTRAMUSCULAR; INTRAVENOUS at 09:58

## 2021-06-30 RX ADMIN — LIDOCAINE HYDROCHLORIDE 20 MG: 20 INJECTION, SOLUTION EPIDURAL; INFILTRATION; INTRACAUDAL; PERINEURAL at 11:06

## 2021-06-30 RX ADMIN — GABAPENTIN 300 MG: 300 CAPSULE ORAL at 21:08

## 2021-06-30 RX ADMIN — ONDANSETRON 4 MG: 2 INJECTION INTRAMUSCULAR; INTRAVENOUS at 21:09

## 2021-06-30 RX ADMIN — EPHEDRINE SULFATE 10 MG: 50 INJECTION INTRAVENOUS at 10:32

## 2021-06-30 RX ADMIN — REMIFENTANIL HYDROCHLORIDE 0.2 MCG/KG/MIN: 1 INJECTION, POWDER, LYOPHILIZED, FOR SOLUTION INTRAVENOUS at 09:35

## 2021-06-30 RX ADMIN — EPHEDRINE SULFATE 5 MG: 50 INJECTION INTRAVENOUS at 10:12

## 2021-06-30 RX ADMIN — ONDANSETRON 4 MG: 2 INJECTION INTRAMUSCULAR; INTRAVENOUS at 11:02

## 2021-06-30 RX ADMIN — PROPOFOL 150 MG: 10 INJECTION, EMULSION INTRAVENOUS at 08:50

## 2021-06-30 RX ADMIN — ONDANSETRON 4 MG: 2 INJECTION INTRAMUSCULAR; INTRAVENOUS at 15:01

## 2021-06-30 RX ADMIN — EPHEDRINE SULFATE 5 MG: 50 INJECTION INTRAVENOUS at 09:20

## 2021-06-30 RX ADMIN — HYDROCODONE BITARTRATE AND ACETAMINOPHEN 1 TABLET: 5; 325 TABLET ORAL at 15:02

## 2021-06-30 RX ADMIN — HYDRALAZINE HYDROCHLORIDE 100 MG: 25 TABLET, FILM COATED ORAL at 15:00

## 2021-06-30 RX ADMIN — OXYCODONE 5 MG: 5 TABLET ORAL at 19:10

## 2021-06-30 RX ADMIN — IPRATROPIUM BROMIDE 0.5 MG: 0.5 SOLUTION RESPIRATORY (INHALATION) at 15:06

## 2021-06-30 RX ADMIN — HYDROCODONE BITARTRATE AND ACETAMINOPHEN 1 TABLET: 5; 325 TABLET ORAL at 21:09

## 2021-06-30 RX ADMIN — SODIUM CHLORIDE, POTASSIUM CHLORIDE, SODIUM LACTATE AND CALCIUM CHLORIDE 1000 ML: 600; 310; 30; 20 INJECTION, SOLUTION INTRAVENOUS at 06:27

## 2021-06-30 RX ADMIN — FENTANYL CITRATE 50 MCG: 50 INJECTION, SOLUTION INTRAMUSCULAR; INTRAVENOUS at 08:50

## 2021-06-30 RX ADMIN — KETAMINE HYDROCHLORIDE 10 MG: 10 INJECTION, SOLUTION INTRAMUSCULAR; INTRAVENOUS at 10:37

## 2021-06-30 RX ADMIN — MONTELUKAST 10 MG: 10 TABLET, FILM COATED ORAL at 21:08

## 2021-06-30 RX ADMIN — ROCURONIUM BROMIDE 5 MG: 10 INJECTION INTRAVENOUS at 10:30

## 2021-06-30 RX ADMIN — VANCOMYCIN HYDROCHLORIDE 1000 MG: 1 INJECTION, POWDER, LYOPHILIZED, FOR SOLUTION INTRAVENOUS at 06:37

## 2021-06-30 RX ADMIN — Medication 3 MG: at 11:19

## 2021-06-30 RX ADMIN — LIDOCAINE HYDROCHLORIDE 70 MG: 20 INJECTION, SOLUTION EPIDURAL; INFILTRATION; INTRACAUDAL; PERINEURAL at 08:50

## 2021-06-30 RX ADMIN — ROCURONIUM BROMIDE 50 MG: 10 INJECTION INTRAVENOUS at 08:50

## 2021-06-30 RX ADMIN — FENTANYL CITRATE 25 MCG: 50 INJECTION, SOLUTION INTRAMUSCULAR; INTRAVENOUS at 12:30

## 2021-06-30 RX ADMIN — MORPHINE SULFATE 2 MG: 4 INJECTION INTRAVENOUS at 21:20

## 2021-06-30 RX ADMIN — ROCURONIUM BROMIDE 10 MG: 10 INJECTION INTRAVENOUS at 09:57

## 2021-06-30 RX ADMIN — ALBUTEROL SULFATE 2.5 MG: 2.5 SOLUTION RESPIRATORY (INHALATION) at 10:49

## 2021-06-30 RX ADMIN — PHENYLEPHRINE HYDROCHLORIDE 0.5 MCG/KG/MIN: 10 INJECTION INTRAVENOUS at 09:19

## 2021-06-30 RX ADMIN — EPHEDRINE SULFATE 5 MG: 50 INJECTION INTRAVENOUS at 09:33

## 2021-06-30 RX ADMIN — DEXAMETHASONE SODIUM PHOSPHATE 10 MG: 4 INJECTION, SOLUTION INTRAMUSCULAR; INTRAVENOUS at 09:53

## 2021-06-30 RX ADMIN — ATORVASTATIN CALCIUM 10 MG: 10 TABLET, FILM COATED ORAL at 21:08

## 2021-06-30 RX ADMIN — HYDRALAZINE HYDROCHLORIDE 100 MG: 25 TABLET, FILM COATED ORAL at 21:08

## 2021-06-30 RX ADMIN — HEPARIN SODIUM 8000 UNITS: 1000 INJECTION, SOLUTION INTRAVENOUS; SUBCUTANEOUS at 10:04

## 2021-06-30 RX ADMIN — FUROSEMIDE 10 MG: 20 TABLET ORAL at 14:06

## 2021-06-30 NOTE — ANESTHESIA PROCEDURE NOTES
Airway  Date/Time: 6/30/2021 8:52 AM  End Time:6/30/2021 8:52 AM    General Information and Staff    Patient location during procedure: OR  Anesthesiologist: Timur Guerrero MD  CRNA: Latonia Elizondo CRNA    Indications and Patient Condition  Indications for airway management: airway protection    Preoxygenated: yes  MILS not maintained throughout  Mask difficulty assessment: 1 - vent by mask    Final Airway Details  Final airway type: endotracheal airway      Successful airway: ETT    Successful intubation technique: video laryngoscopy  Facilitating devices/methods: intubating stylet  Endotracheal tube insertion site: oral  Blade: Glidescope  Blade size: 3  ETT size (mm): 7.0  Cormack-Lehane Classification: grade IIa - partial view of glottis  Placement verified by: chest auscultation and capnometry   Measured from: lips  ETT/EBT  to lips (cm): 21  Number of attempts at approach: 1  Assessment: lips, teeth, and gum same as pre-op and atraumatic intubation

## 2021-06-30 NOTE — ANESTHESIA POSTPROCEDURE EVALUATION
Patient: Gali Dover    Procedure Summary     Date: 06/30/21 Room / Location: [facility] OR [address] [place] [address] MAIN OR    Anesthesia Start: 0843 Anesthesia Stop: 1146    Procedure: CAROTID SUBCLAVIAN BYPASS (Left Neck) Diagnosis:       Subclavian artery stenosis (CMS/HCC)      (Subclavian artery stenosis (CMS/HCC) [I77.1])    Surgeons: Navid Hassan MD Provider: Timur Guerrero MD    Anesthesia Type: general ASA Status: 3          Anesthesia Type: general    Vitals  Vitals Value Taken Time   /46 06/30/21 1228   Temp 98.2 °F (36.8 °C) 06/30/21 1216   Pulse 74 06/30/21 1230   Resp 11 06/30/21 1222   SpO2 93 % 06/30/21 1230   Vitals shown include unvalidated device data.        Post Anesthesia Care and Evaluation    Patient location during evaluation: PACU  Patient participation: complete - patient participated  Level of consciousness: awake  Pain scale: See nurse's notes for pain score.  Pain management: adequate  Airway patency: patent  Anesthetic complications: No anesthetic complications  PONV Status: none  Cardiovascular status: acceptable  Respiratory status: acceptable  Hydration status: acceptable    Comments: Patient seen and examined postoperatively; vital signs stable; SpO2 greater than or equal to 90%; cardiopulmonary status stable; nausea/vomiting adequately controlled; pain adequately controlled; no apparent anesthesia complications; patient discharged from anesthesia care when discharge criteria were met

## 2021-06-30 NOTE — ANESTHESIA PREPROCEDURE EVALUATION
Anesthesia Evaluation     Patient summary reviewed and Nursing notes reviewed   NPO Solid Status: > 8 hours  NPO Liquid Status: > 8 hours           Airway   Mallampati: II  TM distance: >3 FB  Neck ROM: full  Possible difficult intubation  Dental - normal exam         Pulmonary - normal exam    breath sounds clear to auscultation  (+) COPD, asthma,  Cardiovascular - normal exam  Exercise tolerance: unable to assess    ECG reviewed  Rhythm: regular  Rate: normal    (+) hypertension, hyperlipidemia,       Neuro/Psych- negative ROS  GI/Hepatic/Renal/Endo    (+)  GERD,      Musculoskeletal (-) negative ROS    Abdominal  - normal exam   Substance History - negative use     OB/GYN negative ob/gyn ROS         Other - negative ROS                     Anesthesia Plan    ASA 3     general   (Art Line, Cerebral Ox, Glide scope)  intravenous induction     Anesthetic plan, all risks, benefits, and alternatives have been provided, discussed and informed consent has been obtained with: patient.

## 2021-07-01 PROBLEM — I48.0 PAROXYSMAL ATRIAL FIBRILLATION (HCC): Status: ACTIVE | Noted: 2017-09-14

## 2021-07-01 PROBLEM — E78.5 HYPERLIPIDEMIA: Chronic | Status: ACTIVE | Noted: 2021-06-30

## 2021-07-01 PROBLEM — G45.8 SUBCLAVIAN STEAL SYNDROME OF LEFT SUBCLAVIAN ARTERY: Status: ACTIVE | Noted: 2021-07-01

## 2021-07-01 PROBLEM — E66.3 OVERWEIGHT (BMI 25.0-29.9): Status: ACTIVE | Noted: 2021-07-01

## 2021-07-01 PROBLEM — J45.909 ASTHMA: Chronic | Status: ACTIVE | Noted: 2021-06-30

## 2021-07-01 PROBLEM — M35.3 POLYMYALGIA (HCC): Chronic | Status: ACTIVE | Noted: 2021-06-30

## 2021-07-01 PROBLEM — R42 DIZZINESS ON STANDING: Status: ACTIVE | Noted: 2020-11-17

## 2021-07-01 PROBLEM — F32.A DEPRESSION: Chronic | Status: ACTIVE | Noted: 2021-06-30

## 2021-07-01 PROBLEM — M25.561 PAIN IN RIGHT KNEE: Status: ACTIVE | Noted: 2021-02-10

## 2021-07-01 PROBLEM — I65.23 BILATERAL CAROTID ARTERY STENOSIS: Chronic | Status: ACTIVE | Noted: 2021-06-30

## 2021-07-01 PROBLEM — M31.6 GIANT CELL ARTERITIS (HCC): Status: ACTIVE | Noted: 2017-08-08

## 2021-07-01 PROBLEM — M17.11 PRIMARY OSTEOARTHRITIS OF RIGHT KNEE: Status: ACTIVE | Noted: 2021-02-10

## 2021-07-01 PROBLEM — I65.23 BILATERAL CAROTID ARTERY STENOSIS: Status: ACTIVE | Noted: 2021-06-30

## 2021-07-01 PROBLEM — I48.0 PAROXYSMAL ATRIAL FIBRILLATION (HCC): Chronic | Status: ACTIVE | Noted: 2017-09-14

## 2021-07-01 PROBLEM — D50.0 NORMOCYTIC ANEMIA DUE TO BLOOD LOSS: Status: ACTIVE | Noted: 2021-07-01

## 2021-07-01 PROBLEM — R74.01 TRANSAMINITIS: Status: ACTIVE | Noted: 2021-07-01

## 2021-07-01 LAB
ALBUMIN SERPL-MCNC: 3.8 G/DL (ref 3.5–5.2)
ALBUMIN/GLOB SERPL: 2.2 G/DL
ALP SERPL-CCNC: 34 U/L (ref 39–117)
ALT SERPL W P-5'-P-CCNC: 167 U/L (ref 1–33)
ANION GAP SERPL CALCULATED.3IONS-SCNC: 11 MMOL/L (ref 5–15)
AST SERPL-CCNC: 278 U/L (ref 1–32)
BASOPHILS # BLD AUTO: 0 10*3/MM3 (ref 0–0.2)
BASOPHILS NFR BLD AUTO: 0.4 % (ref 0–1.5)
BILIRUB SERPL-MCNC: 0.7 MG/DL (ref 0–1.2)
BUN SERPL-MCNC: 8 MG/DL (ref 8–23)
BUN/CREAT SERPL: 11.6 (ref 7–25)
CALCIUM SPEC-SCNC: 8 MG/DL (ref 8.6–10.5)
CHLORIDE SERPL-SCNC: 96 MMOL/L (ref 98–107)
CHOLEST SERPL-MCNC: 150 MG/DL (ref 0–200)
CO2 SERPL-SCNC: 23 MMOL/L (ref 22–29)
CREAT SERPL-MCNC: 0.69 MG/DL (ref 0.57–1)
DEPRECATED RDW RBC AUTO: 41.1 FL (ref 37–54)
DIGOXIN SERPL-MCNC: 1 NG/ML (ref 0.6–1.2)
EOSINOPHIL # BLD AUTO: 0 10*3/MM3 (ref 0–0.4)
EOSINOPHIL NFR BLD AUTO: 0.2 % (ref 0.3–6.2)
ERYTHROCYTE [DISTWIDTH] IN BLOOD BY AUTOMATED COUNT: 13.6 % (ref 12.3–15.4)
GFR SERPL CREATININE-BSD FRML MDRD: 83 ML/MIN/1.73
GLOBULIN UR ELPH-MCNC: 1.7 GM/DL
GLUCOSE SERPL-MCNC: 102 MG/DL (ref 65–99)
HCT VFR BLD AUTO: 26.5 % (ref 34–46.6)
HDLC SERPL-MCNC: 34 MG/DL (ref 40–60)
HGB BLD-MCNC: 9.1 G/DL (ref 12–15.9)
LDLC SERPL CALC-MCNC: 92 MG/DL (ref 0–100)
LDLC/HDLC SERPL: 2.64 {RATIO}
LYMPHOCYTES # BLD AUTO: 1.6 10*3/MM3 (ref 0.7–3.1)
LYMPHOCYTES NFR BLD AUTO: 18.1 % (ref 19.6–45.3)
MAGNESIUM SERPL-MCNC: 1.7 MG/DL (ref 1.6–2.4)
MCH RBC QN AUTO: 30 PG (ref 26.6–33)
MCHC RBC AUTO-ENTMCNC: 34.3 G/DL (ref 31.5–35.7)
MCV RBC AUTO: 87.3 FL (ref 79–97)
MONOCYTES # BLD AUTO: 1 10*3/MM3 (ref 0.1–0.9)
MONOCYTES NFR BLD AUTO: 11.2 % (ref 5–12)
NEUTROPHILS NFR BLD AUTO: 6.2 10*3/MM3 (ref 1.7–7)
NEUTROPHILS NFR BLD AUTO: 70.1 % (ref 42.7–76)
NRBC BLD AUTO-RTO: 0 /100 WBC (ref 0–0.2)
PHOSPHATE SERPL-MCNC: 3.7 MG/DL (ref 2.5–4.5)
PLATELET # BLD AUTO: 243 10*3/MM3 (ref 140–450)
PMV BLD AUTO: 8.2 FL (ref 6–12)
POTASSIUM SERPL-SCNC: 4.1 MMOL/L (ref 3.5–5.2)
PROT SERPL-MCNC: 5.5 G/DL (ref 6–8.5)
RBC # BLD AUTO: 3.03 10*6/MM3 (ref 3.77–5.28)
SODIUM SERPL-SCNC: 130 MMOL/L (ref 136–145)
TRIGL SERPL-MCNC: 131 MG/DL (ref 0–150)
VLDLC SERPL-MCNC: 24 MG/DL (ref 5–40)
WBC # BLD AUTO: 8.9 10*3/MM3 (ref 3.4–10.8)

## 2021-07-01 PROCEDURE — 80061 LIPID PANEL: CPT | Performed by: NURSE PRACTITIONER

## 2021-07-01 PROCEDURE — 25010000002 MORPHINE PER 10 MG: Performed by: SURGERY

## 2021-07-01 PROCEDURE — 94799 UNLISTED PULMONARY SVC/PX: CPT

## 2021-07-01 PROCEDURE — 63710000001 ONDANSETRON PER 8 MG: Performed by: NURSE PRACTITIONER

## 2021-07-01 PROCEDURE — 83735 ASSAY OF MAGNESIUM: CPT | Performed by: NURSE PRACTITIONER

## 2021-07-01 PROCEDURE — 25010000002 ONDANSETRON PER 1 MG: Performed by: SURGERY

## 2021-07-01 PROCEDURE — 85025 COMPLETE CBC W/AUTO DIFF WBC: CPT | Performed by: NURSE PRACTITIONER

## 2021-07-01 PROCEDURE — 99222 1ST HOSP IP/OBS MODERATE 55: CPT | Performed by: NURSE PRACTITIONER

## 2021-07-01 PROCEDURE — 63710000001 ONDANSETRON PER 8 MG: Performed by: SURGERY

## 2021-07-01 PROCEDURE — 80053 COMPREHEN METABOLIC PANEL: CPT | Performed by: NURSE PRACTITIONER

## 2021-07-01 PROCEDURE — 25010000002 ENOXAPARIN PER 10 MG: Performed by: NURSE PRACTITIONER

## 2021-07-01 PROCEDURE — 80162 ASSAY OF DIGOXIN TOTAL: CPT | Performed by: NURSE PRACTITIONER

## 2021-07-01 PROCEDURE — 84100 ASSAY OF PHOSPHORUS: CPT | Performed by: NURSE PRACTITIONER

## 2021-07-01 RX ADMIN — ONDANSETRON HYDROCHLORIDE 4 MG: 4 TABLET, FILM COATED ORAL at 22:23

## 2021-07-01 RX ADMIN — ASPIRIN 325 MG ORAL TABLET 325 MG: 325 PILL ORAL at 09:21

## 2021-07-01 RX ADMIN — DIGOXIN 250 MCG: 250 TABLET ORAL at 22:19

## 2021-07-01 RX ADMIN — MORPHINE SULFATE 2 MG: 4 INJECTION INTRAVENOUS at 02:44

## 2021-07-01 RX ADMIN — LEVOTHYROXINE SODIUM 25 MCG: 0.03 TABLET ORAL at 06:33

## 2021-07-01 RX ADMIN — HYDROCODONE BITARTRATE AND ACETAMINOPHEN 1 TABLET: 5; 325 TABLET ORAL at 02:12

## 2021-07-01 RX ADMIN — BUDESONIDE AND FORMOTEROL FUMARATE DIHYDRATE 2 PUFF: 160; 4.5 AEROSOL RESPIRATORY (INHALATION) at 18:20

## 2021-07-01 RX ADMIN — ONDANSETRON 4 MG: 2 INJECTION INTRAMUSCULAR; INTRAVENOUS at 02:35

## 2021-07-01 RX ADMIN — IPRATROPIUM BROMIDE 0.5 MG: 0.5 SOLUTION RESPIRATORY (INHALATION) at 18:20

## 2021-07-01 RX ADMIN — HYDRALAZINE HYDROCHLORIDE 100 MG: 25 TABLET, FILM COATED ORAL at 09:21

## 2021-07-01 RX ADMIN — METOPROLOL SUCCINATE 100 MG: 50 TABLET, EXTENDED RELEASE ORAL at 06:33

## 2021-07-01 RX ADMIN — LOSARTAN POTASSIUM 100 MG: 50 TABLET, FILM COATED ORAL at 06:33

## 2021-07-01 RX ADMIN — NIFEDIPINE 60 MG: 60 TABLET, FILM COATED, EXTENDED RELEASE ORAL at 09:21

## 2021-07-01 RX ADMIN — HYDROCODONE BITARTRATE AND ACETAMINOPHEN 1 TABLET: 5; 325 TABLET ORAL at 15:45

## 2021-07-01 RX ADMIN — HYDRALAZINE HYDROCHLORIDE 100 MG: 25 TABLET, FILM COATED ORAL at 15:39

## 2021-07-01 RX ADMIN — HYDROCODONE BITARTRATE AND ACETAMINOPHEN 1 TABLET: 5; 325 TABLET ORAL at 22:23

## 2021-07-01 RX ADMIN — HYDROCODONE BITARTRATE AND ACETAMINOPHEN 1 TABLET: 5; 325 TABLET ORAL at 09:22

## 2021-07-01 RX ADMIN — HYDRALAZINE HYDROCHLORIDE 100 MG: 25 TABLET, FILM COATED ORAL at 22:19

## 2021-07-01 RX ADMIN — IPRATROPIUM BROMIDE 0.5 MG: 0.5 SOLUTION RESPIRATORY (INHALATION) at 11:27

## 2021-07-01 RX ADMIN — GABAPENTIN 300 MG: 300 CAPSULE ORAL at 22:19

## 2021-07-01 RX ADMIN — MONTELUKAST 10 MG: 10 TABLET, FILM COATED ORAL at 22:19

## 2021-07-01 RX ADMIN — ENOXAPARIN SODIUM 40 MG: 40 INJECTION SUBCUTANEOUS at 15:39

## 2021-07-01 RX ADMIN — FUROSEMIDE 10 MG: 20 TABLET ORAL at 06:38

## 2021-07-01 RX ADMIN — ONDANSETRON HYDROCHLORIDE 4 MG: 4 TABLET, FILM COATED ORAL at 09:21

## 2021-07-01 RX ADMIN — GABAPENTIN 300 MG: 300 CAPSULE ORAL at 09:21

## 2021-07-01 RX ADMIN — MORPHINE SULFATE 2 MG: 4 INJECTION INTRAVENOUS at 08:51

## 2021-07-01 RX ADMIN — PANTOPRAZOLE SODIUM 40 MG: 40 TABLET, DELAYED RELEASE ORAL at 06:38

## 2021-07-01 RX ADMIN — LABETALOL 20 MG/4 ML (5 MG/ML) INTRAVENOUS SYRINGE 10 MG: at 09:34

## 2021-07-01 RX ADMIN — GABAPENTIN 300 MG: 300 CAPSULE ORAL at 15:39

## 2021-07-01 RX ADMIN — ESCITALOPRAM OXALATE 10 MG: 10 TABLET ORAL at 06:33

## 2021-07-01 NOTE — PLAN OF CARE
Goal Outcome Evaluation:  Plan of Care Reviewed With: patient           Outcome Summary: Pt. transferred to [facility] from ICU. Pt. denies pain at this time, currently resting in bed with ice pack in place to shoulder area and call light in reach.

## 2021-07-01 NOTE — CONSULTS
[facility] Medicine Services      Patient Name: Gali Dover  : 1945  MRN: [id number]  Primary Care Physician: Chris Pedro MD  Date of admission: 2021    Patient Care Team:  Chris Pedro MD as PCP - General (Internal Medicine)  Navid Hassan MD as Surgeon (Vascular Surgery)  Rubén Avitia MD as Consulting Physician (Cardiology)  Dante Langston MD as Consulting Physician (Rheumatology)  Laurita Swanson APRN as Consulting Physician (Nurse Practitioner)  Carmelita Cruz MD as Consulting Physician (Pulmonary Disease)          Subjective   History Present Illness     Chief Complaint:  Post-op pain      Ms. Dover is a 75 y.o. female with a history of left subclavian artery steal syndrome and bilateral carotid artery stenosis who presented to [facility] on 2021 and underwent left common carotid artery to subclavian artery bypass with 6 mm PTFE ring supported graft by Dr. Hassan. The patient was admitted to the ICU postoperatively.       21:  The patient was downgraded to monitored SIPS bed and the Hospitalist team was consulted for medical management. The patient's labs reveal acute transaminitis. The patient denies any abdominal pain, nausea or vomiting. She states she feels tired and is complaining of left neck pain at her incision site. She denies any other complaints.           Review of Systems   Constitutional: Positive for malaise/fatigue.   Musculoskeletal: Positive for neck pain.   All other systems reviewed and are negative.        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 Family History was reviewed.     Social History:  reports that she has quit smoking. She does not have any smokeless tobacco history on file. She reports current alcohol use. She reports previous drug use.      Medications:  Prior to Admission medications    Medication Sig Start Date End Date Taking? Authorizing Provider   albuterol sulfate  (90 Base) MCG/ACT inhaler Inhale 2 puffs Every 4 (Four) Hours As Needed for Wheezing. Use or bring dos   Yes Purvi Ko MD   aspirin 325 MG tablet Take 325 mg by mouth Daily. Was told ok day before surgery, but not to take dos per garima at Dr. Hassan's office   Yes Purvi Ko MD   azelastine (ASTELIN) 0.1 % nasal spray 1 spray into the nostril(s) as directed by provider 2 (Two) Times a Day. Use in each nostril as directed   Yes Purvi Ko MD   budesonide-formoterol (SYMBICORT) 160-4.5 MCG/ACT inhaler Inhale 2 puffs 2 (Two) Times a Day. Use and bring dos   Yes Purvi Ko MD   calcium carbonate (OS-VANDANA) 600 MG tablet Take 600 mg by mouth Daily. Stop 6-   Yes Purvi Ko MD   Cholecalciferol (Vitamin D) 50 MCG (2000 UT) capsule Take  by mouth. Stop 6-   Yes Purvi Ko MD   cycloSPORINE (RESTASIS) 0.05 % ophthalmic emulsion 1 drop 2 (Two) Times a Day. Use dos   Yes Purvi Ko MD   digoxin (LANOXIN) 250 MCG tablet Take 250 mcg by mouth every night at bedtime.   Yes Purvi Ko MD   escitalopram (LEXAPRO) 10 MG tablet Take 10 mg by mouth Every Morning. Take dos   Yes  Purvi Ko MD   ezetimibe (Zetia) 10 MG tablet Take 10 mg by mouth every night at bedtime.   Yes Purvi Ko MD   fluticasone (FLONASE) 50 MCG/ACT nasal spray 2 sprays into the nostril(s) as directed by provider Every Morning.   Yes Purvi Ko MD   furosemide (LASIX) 20 MG tablet Take 10 mg by mouth Every Morning. Do not take dos   Yes Purvi Ko MD   gabapentin (NEURONTIN) 300 MG capsule Take 300 mg by mouth 3 (Three) Times a Day.   Yes Purvi Ko MD   hydrALAZINE (APRESOLINE) 100 MG tablet Take 100 mg by mouth 3 (Three) Times a Day. Take dos   Yes Purvi Ko MD   immune globulin, human, (GAMMAGARD) 1 GM/10ML solution infusion Infuse  into a venous catheter 1 (One) Time.   Yes Purvi Ko MD   levothyroxine (SYNTHROID, LEVOTHROID) 25 MCG tablet Take 25 mcg by mouth Every Morning. Take dos   Yes Purvi Ko MD   losartan (COZAAR) 100 MG tablet Take 100 mg by mouth Every Morning. LD 6-29-21 before 0800   Yes Purvi Ko MD   loteprednol (LOTEMAX) 0.5 % ophthalmic suspension Administer 1 drop into the left eye every night at bedtime.   Yes Purvi Ko MD   metoprolol succinate XL (TOPROL-XL) 100 MG 24 hr tablet Take 100 mg by mouth Every Morning. Take dos   Yes uPrvi Ko MD   montelukast (SINGULAIR) 10 MG tablet Take 10 mg by mouth Every Night.   Yes Purvi Ko MD   NIFEdipine XL (PROCARDIA XL) 60 MG 24 hr tablet Take 60 mg by mouth 2 (two) times a day. Take dos   Yes Purvi Ko MD   omeprazole (PrilOSEC) 20 MG capsule Take 20 mg by mouth Every Morning. Take dos   Yes Purvi Ko MD   Probiotic Product (PROBIOTIC PO) Take  by mouth.   Yes Purvi Ko MD   simvastatin (ZOCOR) 40 MG tablet Take 40 mg by mouth Every Night.   Yes Purvi Ko MD   tiotropium (SPIRIVA) 18 MCG per inhalation capsule Place 1 capsule into inhaler and inhale Every Morning. Use  and bring dos   Yes Purvi Ko MD   Tocilizumab (Actemra) 162 MG/0.9ML solution prefilled syringe Inject 1 syringe under the skin into the appropriate area as directed 1 (One) Time Per Week. Monday   Yes Purvi Ko MD   fluconazole (DIFLUCAN) 200 MG tablet Take 200 mg by mouth every night at bedtime.    Purvi Ko MD       Allergies:    Allergies   Allergen Reactions   • Crestor [Rosuvastatin] Myalgia   • Lipitor [Atorvastatin] Myalgia   • Penicillins Hives   • Hctz [Hydrochlorothiazide] Rash       Objective   Objective     Vital Signs  Temp:  [97.7 °F (36.5 °C)-99.7 °F (37.6 °C)] 99.7 °F (37.6 °C)  Heart Rate:  [65-90] 66  Resp:  [16] 16  BP: (127-167)/(40-45) 167/45  Arterial Line BP: (103-173)/(35-54) 173/48  SpO2:  [92 %-100 %] 100 %  on  Flow (L/min):  [2-4] 2;   Device (Oxygen Therapy): nasal cannula  Body mass index is 26.78 kg/m².    Physical Exam  Vitals reviewed.   Constitutional:       Appearance: She is overweight.   HENT:      Head: Normocephalic.   Eyes:      Extraocular Movements: Extraocular movements intact.      Conjunctiva/sclera: Conjunctivae normal.      Pupils: Pupils are equal, round, and reactive to light.   Cardiovascular:      Rate and Rhythm: Normal rate and regular rhythm.      Pulses: Normal pulses.   Pulmonary:      Effort: Pulmonary effort is normal.      Breath sounds: Normal breath sounds.      Comments: O2 @ 2 L per NC  Abdominal:      General: Bowel sounds are normal. There is no distension.      Palpations: Abdomen is soft.      Tenderness: There is no abdominal tenderness.   Musculoskeletal:         General: Normal range of motion.      Cervical back: Normal range of motion.      Right lower leg: No edema.      Left lower leg: No edema.   Skin:     Findings: Bruising present.      Comments: Left neck surgical incision approximated, dry  leck neck/upper chest bruised   Neurological:      Mental Status: She is oriented to person, place, and time.       Comments: drowsy         Results Review:  I have personally reviewed most recent cardiac tracings, lab results and radiology images and interpretations and agree with findings.    Results from last 7 days   Lab Units 07/01/21  0550   WBC 10*3/mm3 8.90   HEMOGLOBIN g/dL 9.1*   HEMATOCRIT % 26.5*   PLATELETS 10*3/mm3 243     Results from last 7 days   Lab Units 07/01/21  0550   SODIUM mmol/L 130*   POTASSIUM mmol/L 4.1   CHLORIDE mmol/L 96*   CO2 mmol/L 23.0   BUN mg/dL 8   CREATININE mg/dL 0.69   GLUCOSE mg/dL 102*   CALCIUM mg/dL 8.0*   ALT (SGPT) U/L 167*   AST (SGOT) U/L 278*     Estimated Creatinine Clearance: 59.9 mL/min (by C-G formula based on SCr of 0.69 mg/dL).  Brief Urine Lab Results     None          Microbiology Results (last 10 days)     Procedure Component Value - Date/Time    COVID PRE-OP / PRE-PROCEDURE SCREENING ORDER (NO ISOLATION) - Swab, Nasopharynx [[id number]]  (Normal) Collected: 06/28/21 1151    Lab Status: Final result Specimen: Swab from Nasopharynx Updated: 06/28/21 2005    Narrative:      The following orders were created for panel order COVID PRE-OP / PRE-PROCEDURE SCREENING ORDER (NO ISOLATION) - Swab, Nasopharynx.  Procedure                               Abnormality         Status                     ---------                               -----------         ------                     COVID-19,APTIMA PANTHER,...[[id number]]  Normal              Final result                 Please view results for these tests on the individual orders.    COVID-19,APTIMA PANTHER,JENNIFER IN-HOUSE, NP/OP SWAB IN UTM/VTM/SALINE TRANSPORT MEDIA,24 HR TAT - Swab, Nasopharynx [[id number]]  (Normal) Collected: 06/28/21 1151    Lab Status: Final result Specimen: Swab from Nasopharynx Updated: 06/28/21 2005     COVID19 Not Detected    Narrative:      Fact sheet for providers: https://www.fda.gov/media/338551/download     Fact sheet for patients: https://www.fda.gov/media/527350/download    Test performed by RT PCR.           ECG/EMG Results (most recent)     None          Results for orders placed during the hospital encounter of 06/01/21    Duplex carotid ultrasound CAR    Interpretation Summary  · Proximal right internal carotid artery moderate stenosis.  · Proximal left internal carotid artery moderate stenosis.  · Left Vertebral: Retrograde flow noted.        No radiology results for the last 7 days      Estimated Creatinine Clearance: 59.9 mL/min (by C-G formula based on SCr of 0.69 mg/dL).      Assessment/Plan   Assessment/Plan       Active Hospital Problems    Diagnosis  POA   • **Subclavian steal syndrome of left subclavian artery [G45.8]  Yes   • Transaminitis [R74.01]  No   • Overweight (BMI 25.0-29.9) [E66.3]  Yes   • Normocytic anemia due to blood loss [D50.0]  No   • Bilateral carotid artery stenosis [I65.23]  Yes   • Asthma [J45.909]  Yes   • Depression [F32.9]  Yes   • Hyperlipidemia [E78.5]  Yes   • Polymyalgia  [M35.3]  Yes   • Hypothyroid [E03.9]  Yes   • Peripheral neuropathy [G62.9]  Yes   • Paroxysmal atrial fibrillation (CMS/HCC) [I48.0]  Yes   • 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       Subclavian steal syndrome of left subclavian artery  Bilateral carotid artery stenosis  -status post left common carotid artery to subclavian artery bypass with 6 mm PTFE ring supported graft (07/01/21)  -post-op care per vascular surgery   -on aspirin  -statin discontinued to transaminitis  -PRN analgesia per surgery     Transaminitis  -,  today compared to 20 and 27 on 02/22/21  -discontinue statin  -patient denies abdominal pain, N/V  -monitor and trend LFTs    Normocytic anemia due to blood loss  -hgb 9.1 today  -monitor and trend     Asthma / COPD (chronic  obstructive pulmonary disease) with chronic bronchitis   -stable without exacerbation  -continue Symbicort and albuterol   -Atrovent substituted for Spiriva   -continue Singulair     Paroxysmal atrial fibrillation  -currently SR  -continue aspirin, digoxin, metoprolol XL, and Procardia XL    Depression / Anxiety  -continue Lexapro     GERD (gastroesophageal reflux disease)  -continue PPI    Hyperlipidemia  -statin discontinued as above  -Zetia non-formulary     Essential hypertension, chronic  -continue Lasix, hydralazine, losartan, metoprolol XL, and Procardia XL  -monitor BP    Polymyalgia, Peripheral neuropathy  -PRN analgesia as above  -continue gabapentin     Hypothyroid  -continue levothyroxine    Overweight (BMI 25.0-29.9)  -BMI 26.78 on admission  -encourage lifestyle modifications          VTE Prophylaxis -   Mechanical Order History:     None      Pharmalogical Order History:      Ordered     Dose Route Frequency Stop    06/30/21 1129  enoxaparin (LOVENOX) syringe 40 mg      40 mg SC Daily --    06/30/21 0750  sodium chloride 500 mL with heparin (porcine) 5000 UNIT/ML 5,000 Units mixture  Status:  Discontinued      -- -- As Needed 06/30/21 1152                CODE STATUS:    Code Status and Medical Interventions:   Ordered at: 06/30/21 1130     Code Status:    CPR     Medical Interventions (Level of Support Prior to Arrest):    Full       This patient has been examined wearing appropriate Personal Protective Equipment. 07/01/21      Signature:  Electronically signed by RICKY Chamberlain, 07/01/21, 2:10 PM EDT.  [facility] Hospitalist Team

## 2021-07-01 NOTE — CASE MANAGEMENT/SOCIAL WORK
Discharge Planning Assessment   [facility]     Patient Name: Gali Dover  MRN: [id number]  Today's Date: 7/1/2021    Admit Date: 6/30/2021    Discharge Needs Assessment     Row Name 07/01/21 0903       Living Environment    Lives With  alone    Current Living Arrangements  home/apartment/condo    Quality of Family Relationships  supportive    Able to Return to Prior Arrangements  yes       Resource/Environmental Concerns    Transportation Concerns  car, none       Transition Planning    Patient/Family Anticipates Transition to  home    Transportation Anticipated  family or friend will provide       Discharge Needs Assessment    Readmission Within the Last 30 Days  no previous admission in last 30 days    Equipment Currently Used at Home  none        Discharge Plan     Row Name 07/01/21 0903       Plan    Plan  D/C Plan : PT to eval , watch for home o2 needs    Patient/Family in Agreement with Plan  yes    Plan Comments  Barrier to D/C : Pt is POD 1 of a Carotid Subclavian Bypass, pt is on 4l of o2        Continued Care and Services - Admitted Since 6/30/2021    Coordination has not been started for this encounter.         Demographic Summary     Row Name 07/01/21 0902       General Information    Admission Type  inpatient    Arrived From  operating room    Required Notices Provided  Important Message from Medicare    Preferred Language  English     Used During This Interaction  no        Functional Status     Row Name 07/01/21 0903       Functional Status    Usual Activity Tolerance  good    Current Activity Tolerance  moderate       Mental Status    General Appearance WDL  WDL       Mental Status Summary    Recent Changes in Mental Status/Cognitive Functioning  no changes                   Lala Cabrera RN

## 2021-07-01 NOTE — PROGRESS NOTES
Name: Gali Dover ADMIT: 2021   : 1945  PCP: Chris Pedro MD    MRN: [id number] LOS: 1 days   AGE/SEX: 75 y.o. female  ROOM: [address] [place] [address]    [place], Post Op [employer]    No chief complaint on file.    CC: neck is sore  Subjective     75 y.o. female s/p left common carotid artery to subclavian artery bypass with PTFE.  Patient complains of neck soreness, appropriate.  No new focal neurologic symptoms.  Patient up and out of bed this a.m. on bedside commode.  BP stable overnight.    Review of Systems  Denies focal neurologic symptoms  Denies cardiopulmonary complaints  Objective     Scheduled Medications:   aspirin, 325 mg, Oral, Daily  atorvastatin, 10 mg, Oral, Nightly  budesonide-formoterol, 2 puff, Inhalation, BID - RT  digoxin, 250 mcg, Oral, Nightly  enoxaparin, 40 mg, Subcutaneous, Daily  escitalopram, 10 mg, Oral, QAM  furosemide, 10 mg, Oral, QAM  gabapentin, 300 mg, Oral, TID  hydrALAZINE, 100 mg, Oral, TID  ipratropium, 0.5 mg, Nebulization, 4x Daily - RT  levothyroxine, 25 mcg, Oral, Q AM  losartan, 100 mg, Oral, QAM  metoprolol succinate XL, 100 mg, Oral, QAM  montelukast, 10 mg, Oral, Nightly  NIFEdipine XL, 60 mg, Oral, Q24H  pantoprazole, 40 mg, Oral, QAM        Active Infusions:  lactated ringers, 50 mL/hr, Last Rate: 50 mL/hr (21 1249)        As Needed Medications:  •  acetaminophen  •  albuterol sulfate HFA  •  aluminum-magnesium hydroxide-simethicone  •  HYDROcodone-acetaminophen  •  labetalol  •  magnesium sulfate **OR** magnesium sulfate in D5W 1g/100mL (PREMIX)  •  Morphine **AND** naloxone  •  nitroglycerin  •  ondansetron **OR** ondansetron  •  potassium chloride **OR** potassium chloride **OR** potassium chloride  •  potassium phosphate infusion greater than 15 mMoles **OR** potassium phosphate infusion greater than 15 mMoles **OR** potassium phosphate **OR** sodium phosphate IVPB **OR** sodium phosphate IVPB **OR** sodium phosphate IVPB    Vital Signs  Vital  Signs Patient Vitals for the past 24 hrs:   BP Temp Temp src Pulse Resp SpO2 Weight   07/01/21 0800 -- 99.7 °F (37.6 °C) Oral -- -- -- --   07/01/21 0633 127/40 -- -- 86 -- -- --   07/01/21 0400 -- 99.3 °F (37.4 °C) Oral 83 -- -- 73 kg (160 lb 15 oz)   07/01/21 0330 -- -- -- 79 -- -- --   07/01/21 0300 -- -- -- 80 -- -- --   07/01/21 0230 -- -- -- 85 -- -- --   07/01/21 0200 -- -- -- 87 -- 92 % --   07/01/21 0130 -- -- -- 82 -- 96 % --   07/01/21 0100 -- -- -- 84 -- 96 % --   07/01/21 0030 -- -- -- 83 -- 97 % --   07/01/21 0000 -- -- -- 83 -- 95 % --   06/30/21 2330 -- -- -- 83 -- 98 % --   06/30/21 2300 -- -- -- 79 -- 97 % --   06/30/21 2230 -- -- -- 82 -- 98 % --   06/30/21 2200 -- -- -- 77 -- 100 % --   06/30/21 2130 -- -- -- 80 -- 99 % --   06/30/21 2100 -- -- -- 87 -- 95 % --   06/30/21 2030 -- -- -- 79 -- 99 % --   06/30/21 2000 -- 97.9 °F (36.6 °C) Oral 79 -- 98 % --   06/30/21 1930 -- -- -- 75 -- 99 % --   06/30/21 1907 -- -- -- 78 16 94 % --   06/30/21 1905 -- -- -- 80 16 96 % --   06/30/21 1904 -- -- -- 79 16 97 % --   06/30/21 1900 -- -- -- 84 -- 100 % --   06/30/21 1859 -- -- -- 82 16 99 % --   06/30/21 1825 -- -- -- 79 -- 99 % --   06/30/21 1810 -- -- -- 81 -- 98 % --   06/30/21 1720 -- -- -- 90 -- 99 % --   06/30/21 1640 -- -- -- 82 -- 98 % --   06/30/21 1615 -- -- -- 80 -- 98 % --   06/30/21 1610 -- -- -- 78 -- -- --   06/30/21 1600 -- 97.7 °F (36.5 °C) Oral -- -- -- --   06/30/21 1555 -- -- -- 83 -- 97 % --   06/30/21 1535 -- -- -- 79 -- 98 % --   06/30/21 1514 -- -- -- 81 -- 96 % --   06/30/21 1506 -- -- -- 79 16 100 % --   06/30/21 1455 -- -- -- 80 -- 98 % --   06/30/21 1435 -- -- -- 79 -- 100 % --   06/30/21 1410 -- -- -- 76 -- 99 % --   06/30/21 1400 -- -- -- 78 -- 98 % --   06/30/21 1345 -- -- -- 75 -- 97 % --   06/30/21 1330 -- -- -- 72 -- 94 % --   06/30/21 1315 -- -- -- 73 -- 95 % --   06/30/21 1305 -- -- -- 70 -- 96 % --   06/30/21 1300 -- -- -- 73 -- 95 % --   06/30/21 1255 -- -- -- 70 --  95 % --   06/30/21 1230 124/46 -- -- 74 -- 93 % --   06/30/21 1222 117/45 -- -- 75 11 90 % --   06/30/21 1216 115/46 98.2 °F (36.8 °C) Temporal 76 12 90 % --   06/30/21 1201 131/48 -- -- 80 10 93 % --   06/30/21 1156 142/44 -- -- 79 17 94 % --   06/30/21 1151 140/43 -- -- 79 17 93 % --   06/30/21 1146 143/48 97.8 °F (36.6 °C) Temporal 83 14 92 % --     I/O:   Intake/Output Summary (Last 24 hours) at 7/1/2021 0820  Last data filed at 6/30/2021 2000  Gross per 24 hour   Intake 500 ml   Output 325 ml   Net 175 ml     BMI:  Body mass index is 26.78 kg/m².    Physical Exam:  Physical Exam   Left sided incision site swollen, expected tenderness.  No new focal neurologic deficits.  Palpable radial pulse.  Results Review:     CBC    Results from last 7 days   Lab Units 07/01/21  0550 06/30/21  1542   WBC 10*3/mm3 8.90 10.10   HEMOGLOBIN g/dL 9.1* 10.1*   PLATELETS 10*3/mm3 243 262     BMP   Results from last 7 days   Lab Units 07/01/21  0550 06/30/21  1542   SODIUM mmol/L 130* 134*   POTASSIUM mmol/L 4.1 4.4   CHLORIDE mmol/L 96* 99   CO2 mmol/L 23.0 23.0   BUN mg/dL 8 9   CREATININE mg/dL 0.69 0.73   GLUCOSE mg/dL 102* 168*   MAGNESIUM mg/dL 1.7 1.4*   PHOSPHORUS mg/dL 3.7 4.2     Coag     HbA1C No results found for: HGBA1C  Infection     Radiology(recent) No radiology results for the last day    Assessment/Plan     Assessment & Plan      Subclavian steal syndrome    Stenosis of carotid artery    Anxiety    Asthma    Atrial fibrillation     COPD (chronic obstructive pulmonary disease)    Depression    GERD (gastroesophageal reflux disease)    Hyperlipemia    Essential hypertension    Polymyalgia     Hypothyroid    Peripheral neuropathy      75 y.o. female POD #1 left common carotid artery to subclavian artery bypass with PTFE.  Tolerated procedure well.  Appropriate postsurgical soreness.  No new focal neurologic symptoms.  Blood pressures controlled, not requiring pressors.  Hemodynamically stable.    If blood pressure  continues to remain good can transfer to sips monitor.  A line out.  Progress to regular diet.      Personal protective equipment used for this patient encounter:  Patient wearing surgical mask []    Provider wearing a surgical mask [x]    Gloves [x]    Eye protection []    Face Shield []    Gown []    N 95 respirator or CAPR/PAPR []   Duration of interaction 10 minutes    Kendra Berumen PA-C  07/01/21  08:19 EDT    Please call my office with any question: [phone]    Active Hospital Problems    Diagnosis  POA   • **Subclavian steal syndrome [G45.8]  Yes   • Stenosis of carotid artery [I65.29]  Yes   • Anxiety [F41.9]  Yes   • Asthma [J45.909]  Yes   • Atrial fibrillation  [I48.91]  Yes   • COPD (chronic obstructive pulmonary disease) [J44.9]  Yes   • Depression [F32.9]  Yes   • GERD (gastroesophageal reflux disease) [K21.9]  Yes   • Hyperlipemia [E78.5]  Yes   • Essential hypertension [I10]  Yes   • Polymyalgia  [M35.3]  Yes   • Hypothyroid [E03.9]  Yes   • Peripheral neuropathy [G62.9]  Clinically Undetermined      Resolved Hospital Problems   No resolved problems to display.

## 2021-07-01 NOTE — PLAN OF CARE
VSS overnight. Persistant pain in shoulder and at surgical site. PRN pain meds effective.     Goal Outcome Evaluation:

## 2021-07-01 NOTE — PROGRESS NOTES
PULMONARY/CRITICAL CARE PROGRESS NOTE       NAME:     Gali Dover   AGE:     75 y.o.   SEX:  female   : 1945       MRN:       [id number]          RM: [facility] ICU      ASSESSMENT & PLAN     F/U: Medical management in ICU    Principal Problem:    Subclavian steal syndrome  Active Problems:    Stenosis of carotid artery    Anxiety    Asthma    Atrial fibrillation     COPD (chronic obstructive pulmonary disease)    Depression    GERD (gastroesophageal reflux disease)    Hyperlipemia    Essential hypertension    Polymyalgia     Hypothyroid    Peripheral neuropathy       Multidisciplinary Rounds:  -SIPS-Monitor, hospitalist consult  -Possible discharge tomorrow, defer to Vascular surgery.      PLAN:  Subclavian steal syndrome, s/p carotid subclavian bypass surgery   -Continuous arterial blood pressure monitoring  -Neurovascular checks per vascular surgery order  -Monitor for bleeding  -Keep incision clean and dry  -IVFs per vascular, LR and 50 ml/hr   -Clear liquid diet, advance as tolerated, per vascular surgery     Stenosis of carotid artery  -Duplex carotid ultrasound performed on 2021 showed both left andright internal carotid 60-69% stenosis   -Monitor blood pressure and neuro status  -Vascular following, continue outpatient vascular follow-ups     Peripheral neuropathy   -Home gabapentin restarted     Essential hypertension  -Home hydralazine, cozaar, procardia, metoprolol XL, continued per vesicular surgery  -Continuous arterial monitoring     Atrial fibrillation  -Continuous cardiac monitoring  -EKG PRN for chest pain or rhythm changes  -Home digoxin and metoprolol continued  -Anti-coagulation per vascular surgery recommendation     COPD (chronic obstructive pulmonary disease)  Asthma  -Home symbicort, Atrovent, singular continued     Hyperlipemia  -Home statin therapy continued     Hypothyroid  -Home levothyroxine continued     Anxiety  Depression  -Home lexapro continued     GERD  (gastroesophageal reflux disease)  -Protonix EC      Polymyalgia   -Pain medication PRN   -Home gabapentin continued      Code Status: CPR, Full interventions  VTE Prophylaxis: SCDs  PUD Prophylaxis: Prontonix      [place] & SUBJECTIVE     Gali Dover is a 75 y.o. 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   Patient presented to [facility] outpatient on 6/30/2021 for left carotid subclavian bypass surgery for left subclavian artery steal syndrome. The procedure was successful with palpable pulse post-op.      Upon assessment the patient is laying in bed, drowsy but alert. The patient is complaining of having the sensation to clear her throat often. She denies difficulty breathing. The sensation is most likely related to irritation from ET tube during surgery. Explained to the patient to notify nurse immediatly if it become difficult to swallow or breath. The patient is complaining of some numbness in tingling in her legs which is chronic related to peripheral neuropathy. Otherwise no other complaints. The patient denies any headaches, visual disturbances, dizziness,  shortness of breath, chest pain or pressure, nausea, vomiting, abdominal pain, incisional pain, diarrhea, constipation, and/or difficulty with bowel or bladder.      Post-op hemoglobin is 11.4, baseline of 12.3 on 2/22/2021.      Pulmonary consultation was requested for further evaluation and treatment of patient condition.       Thank you for this consultation, we will follow along on this patient.      7/1:  Reports mild post-operative pain is well controlled, cough, denies shortness of breath, nausea, vomiting, palpitations, syncope.    REVIEW OF  "SYSTEMS:  Pertinent items are noted in HPI, all other systems reviewed and negative    MEDICATIONS     SCHEDULED MEDICATIONS:   aspirin, 325 mg, Oral, Daily  atorvastatin, 10 mg, Oral, Nightly  budesonide-formoterol, 2 puff, Inhalation, BID - RT  digoxin, 250 mcg, Oral, Nightly  enoxaparin, 40 mg, Subcutaneous, Daily  escitalopram, 10 mg, Oral, QAM  furosemide, 10 mg, Oral, QAM  gabapentin, 300 mg, Oral, TID  hydrALAZINE, 100 mg, Oral, TID  ipratropium, 0.5 mg, Nebulization, 4x Daily - RT  levothyroxine, 25 mcg, Oral, Q AM  losartan, 100 mg, Oral, QAM  metoprolol succinate XL, 100 mg, Oral, QAM  montelukast, 10 mg, Oral, Nightly  NIFEdipine XL, 60 mg, Oral, Q24H  pantoprazole, 40 mg, Oral, QAM        CONTINUOUS INFUSIONS:   lactated ringers, 50 mL/hr, Last Rate: 50 mL/hr (06/30/21 1249)        PRN MEDS:  •  acetaminophen  •  albuterol sulfate HFA  •  aluminum-magnesium hydroxide-simethicone  •  HYDROcodone-acetaminophen  •  labetalol  •  magnesium sulfate **OR** magnesium sulfate in D5W 1g/100mL (PREMIX)  •  Morphine **AND** naloxone  •  nitroglycerin  •  ondansetron **OR** ondansetron  •  potassium chloride **OR** potassium chloride **OR** potassium chloride  •  potassium phosphate infusion greater than 15 mMoles **OR** potassium phosphate infusion greater than 15 mMoles **OR** potassium phosphate **OR** sodium phosphate IVPB **OR** sodium phosphate IVPB **OR** sodium phosphate IVPB    OBJECTIVE     VITAL SIGNS:  /45   Pulse 70   Temp 99.7 °F (37.6 °C) (Oral)   Resp 16   Ht 165.1 cm (65\")   Wt 73 kg (160 lb 15 oz)   SpO2 92%   BMI 26.78 kg/m²     I/O FROM PREVIOUS 3 SHIFTS:  I/O last 3 completed shifts:  In: 500 [I.V.:500]  Out: 325 [Urine:300; Blood:25]    I/O THIS SHIFT:  No intake/output data recorded.    BOWEL MOVEMENTS:          PHYSICAL EXAM:  Constitutional:  Well developed, well nourished, no acute distress, non-toxic appearance   Eyes:  PERRL, conjunctiva normal  HENT:  Atraumatic, external " ears normal, nose normal,  trachea midline  Respiratory:  Clear and equal throughout, non-labored respirations without accessory muscle use  Cardiovascular:  Normal rate, normal rhythm, no murmurs, no gallops, no rubs   GI:  Soft, nondistended, normal bowel sounds  : Defer   Musculoskeletal:  No edema, no tenderness, no deformities  Integument:  Well hydrated, no rash, left subclavian incision that is approximated with mild swelling around site, no drainage  Neurologic:  Alert & oriented x 3, normal motor function, normal sensory function, no focal deficits noted   Psychiatric:  Speech and behavior appropriate       RESULTS     LABS:  Lab Results (last 24 hours)     Procedure Component Value Units Date/Time    Comprehensive Metabolic Panel [[id number]]  (Abnormal) Collected: 07/01/21 0550    Specimen: Blood Updated: 07/01/21 0626     Glucose 102 mg/dL      BUN 8 mg/dL      Creatinine 0.69 mg/dL      Sodium 130 mmol/L      Potassium 4.1 mmol/L      Chloride 96 mmol/L      CO2 23.0 mmol/L      Calcium 8.0 mg/dL      Total Protein 5.5 g/dL      Albumin 3.80 g/dL      ALT (SGPT) 167 U/L      AST (SGOT) 278 U/L      Alkaline Phosphatase 34 U/L      Total Bilirubin 0.7 mg/dL      eGFR Non African Amer 83 mL/min/1.73      Globulin 1.7 gm/dL      A/G Ratio 2.2 g/dL      BUN/Creatinine Ratio 11.6     Anion Gap 11.0 mmol/L     Narrative:      GFR Normal >60  Chronic Kidney Disease <60  Kidney Failure <15      Lipid Panel [[id number]]  (Abnormal) Collected: 07/01/21 0550    Specimen: Blood Updated: 07/01/21 0626     Total Cholesterol 150 mg/dL      Triglycerides 131 mg/dL      HDL Cholesterol 34 mg/dL      LDL Cholesterol  92 mg/dL      VLDL Cholesterol 24 mg/dL      LDL/HDL Ratio 2.64    Narrative:      Cholesterol Reference Ranges  (U.S. Department of Health and Human Services ATP III Classifications)    Desirable          <200 mg/dL  Borderline High    200-239 mg/dL  High Risk          >240 mg/dL      Triglyceride Reference  Ranges  (U.S. Department of Health and Human Services ATP III Classifications)    Normal           <150 mg/dL  Borderline High  150-199 mg/dL  High             200-499 mg/dL  Very High        >500 mg/dL    HDL Reference Ranges  (U.S. Department of Health and Human Services ATP III Classifcations)    Low     <40 mg/dl (major risk factor for CHD)  High    >60 mg/dl ('negative' risk factor for CHD)        LDL Reference Ranges  (U.S. Department of Health and Human Services ATP III Classifcations)    Optimal          <100 mg/dL  Near Optimal     100-129 mg/dL  Borderline High  130-159 mg/dL  High             160-189 mg/dL  Very High        >189 mg/dL    Phosphorus [[id number]]  (Normal) Collected: 07/01/21 0550    Specimen: Blood Updated: 07/01/21 0626     Phosphorus 3.7 mg/dL     Magnesium [[id number]]  (Normal) Collected: 07/01/21 0550    Specimen: Blood Updated: 07/01/21 0626     Magnesium 1.7 mg/dL     CBC & Differential [[id number]]  (Abnormal) Collected: 07/01/21 0550    Specimen: Blood Updated: 07/01/21 0601    Narrative:      The following orders were created for panel order CBC & Differential.  Procedure                               Abnormality         Status                     ---------                               -----------         ------                     CBC Auto Differential[[id number]]        Abnormal            Final result                 Please view results for these tests on the individual orders.    CBC Auto Differential [[id number]]  (Abnormal) Collected: 07/01/21 0550    Specimen: Blood Updated: 07/01/21 0601     WBC 8.90 10*3/mm3      RBC 3.03 10*6/mm3      Hemoglobin 9.1 g/dL      Hematocrit 26.5 %      MCV 87.3 fL      MCH 30.0 pg      MCHC 34.3 g/dL      RDW 13.6 %      RDW-SD 41.1 fl      MPV 8.2 fL      Platelets 243 10*3/mm3      Neutrophil % 70.1 %      Lymphocyte % 18.1 %      Monocyte % 11.2 %      Eosinophil % 0.2 %      Basophil % 0.4 %      Neutrophils, Absolute 6.20 10*3/mm3       Lymphocytes, Absolute 1.60 10*3/mm3      Monocytes, Absolute 1.00 10*3/mm3      Eosinophils, Absolute 0.00 10*3/mm3      Basophils, Absolute 0.00 10*3/mm3      nRBC 0.0 /100 WBC     Comprehensive Metabolic Panel [[id number]]  (Abnormal) Collected: 06/30/21 1542    Specimen: Blood Updated: 06/30/21 1626     Glucose 168 mg/dL      BUN 9 mg/dL      Creatinine 0.73 mg/dL      Sodium 134 mmol/L      Potassium 4.4 mmol/L      Chloride 99 mmol/L      CO2 23.0 mmol/L      Calcium 8.5 mg/dL      Total Protein 5.7 g/dL      Albumin 4.00 g/dL      ALT (SGPT) 93 U/L      AST (SGOT) 148 U/L      Alkaline Phosphatase 29 U/L      Total Bilirubin 0.4 mg/dL      eGFR Non African Amer 78 mL/min/1.73      Globulin 1.7 gm/dL      A/G Ratio 2.4 g/dL      BUN/Creatinine Ratio 12.3     Anion Gap 12.0 mmol/L     Narrative:      GFR Normal >60  Chronic Kidney Disease <60  Kidney Failure <15      Phosphorus [[id number]]  (Normal) Collected: 06/30/21 1542    Specimen: Blood Updated: 06/30/21 1626     Phosphorus 4.2 mg/dL     Magnesium [[id number]]  (Abnormal) Collected: 06/30/21 1542    Specimen: Blood Updated: 06/30/21 1626     Magnesium 1.4 mg/dL     CBC & Differential [[id number]]  (Abnormal) Collected: 06/30/21 1542    Specimen: Blood Updated: 06/30/21 1551    Narrative:      The following orders were created for panel order CBC & Differential.  Procedure                               Abnormality         Status                     ---------                               -----------         ------                     CBC Auto Differential[[id number]]        Abnormal            Final result                 Please view results for these tests on the individual orders.    CBC Auto Differential [[id number]]  (Abnormal) Collected: 06/30/21 1542    Specimen: Blood Updated: 06/30/21 1551     WBC 10.10 10*3/mm3      RBC 3.40 10*6/mm3      Hemoglobin 10.1 g/dL      Hematocrit 29.7 %      MCV 87.2 fL      MCH 29.6 pg      MCHC 33.9 g/dL      RDW 13.8 %       RDW-SD 42.4 fl      MPV 7.8 fL      Platelets 262 10*3/mm3      Neutrophil % 91.2 %      Lymphocyte % 6.7 %      Monocyte % 1.7 %      Eosinophil % 0.0 %      Basophil % 0.4 %      Neutrophils, Absolute 9.20 10*3/mm3      Lymphocytes, Absolute 0.70 10*3/mm3      Monocytes, Absolute 0.20 10*3/mm3      Eosinophils, Absolute 0.00 10*3/mm3      Basophils, Absolute 0.00 10*3/mm3      nRBC 0.1 /100 WBC            RADIOLOGY:  No Radiology Exams Resulted Within Past 24 Hours    ECHOCARDIOGRAM:  No previous for comparison.    I reviewed the patient's new clinical results.    This note has been scribed by me for pulmonary attending physician.    Electronically signed by RICKY Ahmadi, 07/01/21 at 11:15 EDT.

## 2021-07-02 ENCOUNTER — APPOINTMENT (OUTPATIENT)
Dept: GENERAL RADIOLOGY | Facility: HOSPITAL | Age: 76
End: 2021-07-02

## 2021-07-02 LAB
ALBUMIN SERPL-MCNC: 3.8 G/DL (ref 3.5–5.2)
ALBUMIN/GLOB SERPL: 2.2 G/DL
ALP SERPL-CCNC: 34 U/L (ref 39–117)
ALT SERPL W P-5'-P-CCNC: 128 U/L (ref 1–33)
ANION GAP SERPL CALCULATED.3IONS-SCNC: 10 MMOL/L (ref 5–15)
AST SERPL-CCNC: 114 U/L (ref 1–32)
BASOPHILS # BLD AUTO: 0.1 10*3/MM3 (ref 0–0.2)
BASOPHILS NFR BLD AUTO: 1.4 % (ref 0–1.5)
BILIRUB SERPL-MCNC: 0.4 MG/DL (ref 0–1.2)
BUN SERPL-MCNC: 8 MG/DL (ref 8–23)
BUN/CREAT SERPL: 13.3 (ref 7–25)
CALCIUM SPEC-SCNC: 8.4 MG/DL (ref 8.6–10.5)
CHLORIDE SERPL-SCNC: 91 MMOL/L (ref 98–107)
CO2 SERPL-SCNC: 23 MMOL/L (ref 22–29)
CREAT SERPL-MCNC: 0.6 MG/DL (ref 0.57–1)
D DIMER PPP FEU-MCNC: 0.52 MG/L (FEU) (ref 0–0.59)
DEPRECATED RDW RBC AUTO: 41.6 FL (ref 37–54)
EOSINOPHIL # BLD AUTO: 0.5 10*3/MM3 (ref 0–0.4)
EOSINOPHIL NFR BLD AUTO: 7.4 % (ref 0.3–6.2)
ERYTHROCYTE [DISTWIDTH] IN BLOOD BY AUTOMATED COUNT: 13.7 % (ref 12.3–15.4)
GFR SERPL CREATININE-BSD FRML MDRD: 97 ML/MIN/1.73
GLOBULIN UR ELPH-MCNC: 1.7 GM/DL
GLUCOSE SERPL-MCNC: 102 MG/DL (ref 65–99)
HCT VFR BLD AUTO: 28.6 % (ref 34–46.6)
HGB BLD-MCNC: 9.8 G/DL (ref 12–15.9)
LYMPHOCYTES # BLD AUTO: 1.7 10*3/MM3 (ref 0.7–3.1)
LYMPHOCYTES NFR BLD AUTO: 23.3 % (ref 19.6–45.3)
MAGNESIUM SERPL-MCNC: 1.6 MG/DL (ref 1.6–2.4)
MCH RBC QN AUTO: 30.2 PG (ref 26.6–33)
MCHC RBC AUTO-ENTMCNC: 34.2 G/DL (ref 31.5–35.7)
MCV RBC AUTO: 88.3 FL (ref 79–97)
MONOCYTES # BLD AUTO: 0.7 10*3/MM3 (ref 0.1–0.9)
MONOCYTES NFR BLD AUTO: 9.8 % (ref 5–12)
NEUTROPHILS NFR BLD AUTO: 4.3 10*3/MM3 (ref 1.7–7)
NEUTROPHILS NFR BLD AUTO: 58.1 % (ref 42.7–76)
NRBC BLD AUTO-RTO: 0 /100 WBC (ref 0–0.2)
PHOSPHATE SERPL-MCNC: 3.4 MG/DL (ref 2.5–4.5)
PLATELET # BLD AUTO: 234 10*3/MM3 (ref 140–450)
PMV BLD AUTO: 8.4 FL (ref 6–12)
POTASSIUM SERPL-SCNC: 3.9 MMOL/L (ref 3.5–5.2)
PROT SERPL-MCNC: 5.5 G/DL (ref 6–8.5)
RBC # BLD AUTO: 3.24 10*6/MM3 (ref 3.77–5.28)
SODIUM SERPL-SCNC: 124 MMOL/L (ref 136–145)
SODIUM UR-SCNC: 27 MMOL/L
WBC # BLD AUTO: 7.4 10*3/MM3 (ref 3.4–10.8)

## 2021-07-02 PROCEDURE — 85025 COMPLETE CBC W/AUTO DIFF WBC: CPT | Performed by: NURSE PRACTITIONER

## 2021-07-02 PROCEDURE — 99232 SBSQ HOSP IP/OBS MODERATE 35: CPT | Performed by: INTERNAL MEDICINE

## 2021-07-02 PROCEDURE — 84300 ASSAY OF URINE SODIUM: CPT | Performed by: INTERNAL MEDICINE

## 2021-07-02 PROCEDURE — 25010000002 ONDANSETRON PER 1 MG: Performed by: NURSE PRACTITIONER

## 2021-07-02 PROCEDURE — 94799 UNLISTED PULMONARY SVC/PX: CPT

## 2021-07-02 PROCEDURE — 25010000002 FUROSEMIDE PER 20 MG: Performed by: INTERNAL MEDICINE

## 2021-07-02 PROCEDURE — 25010000002 ENOXAPARIN PER 10 MG: Performed by: NURSE PRACTITIONER

## 2021-07-02 PROCEDURE — 85379 FIBRIN DEGRADATION QUANT: CPT | Performed by: INTERNAL MEDICINE

## 2021-07-02 PROCEDURE — 84100 ASSAY OF PHOSPHORUS: CPT | Performed by: NURSE PRACTITIONER

## 2021-07-02 PROCEDURE — 25010000002 MAGNESIUM SULFATE IN D5W 1G/100ML (PREMIX) 1-5 GM/100ML-% SOLUTION: Performed by: NURSE PRACTITIONER

## 2021-07-02 PROCEDURE — 94618 PULMONARY STRESS TESTING: CPT

## 2021-07-02 PROCEDURE — 25010000002 ONDANSETRON PER 1 MG: Performed by: INTERNAL MEDICINE

## 2021-07-02 PROCEDURE — 71045 X-RAY EXAM CHEST 1 VIEW: CPT

## 2021-07-02 PROCEDURE — 80053 COMPREHEN METABOLIC PANEL: CPT | Performed by: NURSE PRACTITIONER

## 2021-07-02 PROCEDURE — 97162 PT EVAL MOD COMPLEX 30 MIN: CPT

## 2021-07-02 PROCEDURE — 83735 ASSAY OF MAGNESIUM: CPT | Performed by: NURSE PRACTITIONER

## 2021-07-02 RX ORDER — DOXYCYCLINE 100 MG/1
100 TABLET ORAL EVERY 12 HOURS SCHEDULED
Status: DISCONTINUED | OUTPATIENT
Start: 2021-07-02 | End: 2021-07-05 | Stop reason: HOSPADM

## 2021-07-02 RX ORDER — ONDANSETRON 2 MG/ML
8 INJECTION INTRAMUSCULAR; INTRAVENOUS EVERY 6 HOURS PRN
Status: DISCONTINUED | OUTPATIENT
Start: 2021-07-02 | End: 2021-07-05 | Stop reason: HOSPADM

## 2021-07-02 RX ORDER — GUAIFENESIN 600 MG/1
600 TABLET, EXTENDED RELEASE ORAL EVERY 12 HOURS SCHEDULED
Status: DISCONTINUED | OUTPATIENT
Start: 2021-07-02 | End: 2021-07-05 | Stop reason: HOSPADM

## 2021-07-02 RX ORDER — FUROSEMIDE 10 MG/ML
40 INJECTION INTRAMUSCULAR; INTRAVENOUS ONCE
Status: COMPLETED | OUTPATIENT
Start: 2021-07-02 | End: 2021-07-02

## 2021-07-02 RX ORDER — ONDANSETRON 4 MG/1
8 TABLET, FILM COATED ORAL EVERY 6 HOURS PRN
Status: DISCONTINUED | OUTPATIENT
Start: 2021-07-02 | End: 2021-07-05 | Stop reason: HOSPADM

## 2021-07-02 RX ADMIN — GABAPENTIN 300 MG: 300 CAPSULE ORAL at 15:45

## 2021-07-02 RX ADMIN — ONDANSETRON 8 MG: 2 INJECTION INTRAMUSCULAR; INTRAVENOUS at 20:58

## 2021-07-02 RX ADMIN — ONDANSETRON 4 MG: 2 INJECTION INTRAMUSCULAR; INTRAVENOUS at 03:45

## 2021-07-02 RX ADMIN — BUDESONIDE AND FORMOTEROL FUMARATE DIHYDRATE 2 PUFF: 160; 4.5 AEROSOL RESPIRATORY (INHALATION) at 07:23

## 2021-07-02 RX ADMIN — ESCITALOPRAM OXALATE 10 MG: 10 TABLET ORAL at 06:51

## 2021-07-02 RX ADMIN — IPRATROPIUM BROMIDE 0.5 MG: 0.5 SOLUTION RESPIRATORY (INHALATION) at 21:04

## 2021-07-02 RX ADMIN — GUAIFENESIN 600 MG: 600 TABLET, EXTENDED RELEASE ORAL at 13:11

## 2021-07-02 RX ADMIN — METOPROLOL SUCCINATE 100 MG: 50 TABLET, EXTENDED RELEASE ORAL at 06:51

## 2021-07-02 RX ADMIN — IPRATROPIUM BROMIDE 0.5 MG: 0.5 SOLUTION RESPIRATORY (INHALATION) at 16:20

## 2021-07-02 RX ADMIN — FUROSEMIDE 40 MG: 10 INJECTION, SOLUTION INTRAMUSCULAR; INTRAVENOUS at 16:34

## 2021-07-02 RX ADMIN — IPRATROPIUM BROMIDE 0.5 MG: 0.5 SOLUTION RESPIRATORY (INHALATION) at 11:52

## 2021-07-02 RX ADMIN — ONDANSETRON 4 MG: 2 INJECTION INTRAMUSCULAR; INTRAVENOUS at 15:45

## 2021-07-02 RX ADMIN — ENOXAPARIN SODIUM 40 MG: 40 INJECTION SUBCUTANEOUS at 15:45

## 2021-07-02 RX ADMIN — LEVOTHYROXINE SODIUM 25 MCG: 0.03 TABLET ORAL at 05:52

## 2021-07-02 RX ADMIN — HYDROCODONE BITARTRATE AND ACETAMINOPHEN 1 TABLET: 5; 325 TABLET ORAL at 20:56

## 2021-07-02 RX ADMIN — ASPIRIN 325 MG ORAL TABLET 325 MG: 325 PILL ORAL at 08:19

## 2021-07-02 RX ADMIN — HYDRALAZINE HYDROCHLORIDE 100 MG: 25 TABLET, FILM COATED ORAL at 08:19

## 2021-07-02 RX ADMIN — IPRATROPIUM BROMIDE 0.5 MG: 0.5 SOLUTION RESPIRATORY (INHALATION) at 07:22

## 2021-07-02 RX ADMIN — GABAPENTIN 300 MG: 300 CAPSULE ORAL at 08:19

## 2021-07-02 RX ADMIN — PANTOPRAZOLE SODIUM 40 MG: 40 TABLET, DELAYED RELEASE ORAL at 05:51

## 2021-07-02 RX ADMIN — MAGNESIUM SULFATE HEPTAHYDRATE 1 G: 1 INJECTION, SOLUTION INTRAVENOUS at 08:54

## 2021-07-02 RX ADMIN — NIFEDIPINE 60 MG: 60 TABLET, FILM COATED, EXTENDED RELEASE ORAL at 08:19

## 2021-07-02 RX ADMIN — FUROSEMIDE 10 MG: 20 TABLET ORAL at 06:52

## 2021-07-02 RX ADMIN — DOXYCYCLINE 100 MG: 100 TABLET, FILM COATED ORAL at 13:11

## 2021-07-02 RX ADMIN — HYDRALAZINE HYDROCHLORIDE 100 MG: 25 TABLET, FILM COATED ORAL at 15:45

## 2021-07-02 RX ADMIN — LOSARTAN POTASSIUM 100 MG: 50 TABLET, FILM COATED ORAL at 06:51

## 2021-07-02 RX ADMIN — ONDANSETRON 4 MG: 2 INJECTION INTRAMUSCULAR; INTRAVENOUS at 09:57

## 2021-07-02 NOTE — NURSING NOTE
Nurse reassessed patients surgical site again. Doesn't seem to have any more swelling then previous assessments. Soft and squishy to touch and patient again denies any feeling of airway restriction. Will continue to monitor

## 2021-07-02 NOTE — DISCHARGE PLACEMENT REQUEST
"Gali Cortes (75 y.o. Female)     Date of Birth Social Security Number Address Home Phone MRN    1945  [address]  [place] [address] [phone] [id number]    [employer] Status          None        Admission Date Admission Type Admitting Provider Attending Provider Department, Room/Bed    6/30/21 Elective Navid Hassan MD Lackey, Diana, MD [facility] SURGICAL INPATIENT, 4110/1    Discharge Date Discharge Disposition Discharge Destination                       Attending Provider: Janina Scott MD    Allergies: Crestor [Rosuvastatin], Lipitor [Atorvastatin], Penicillins, Hctz [Hydrochlorothiazide]    Isolation: None   Infection: None   Code Status: CPR    Ht: 165.1 cm (65\")   Wt: 73 kg (160 lb 15 oz)    Admission Cmt: None   Principal Problem: Subclavian steal syndrome of left subclavian artery [G45.8]                 Active Insurance as of 6/30/2021     Primary Coverage     Payor Plan Insurance Group Employer/Plan Group    MEDICARE MEDICARE A & B      Payor Plan Address Payor Plan Phone Number Payor Plan Fax Number Effective Dates    [address] [phone]  7/1/2009 - None Entered    [facility] [address]       Subscriber Name Subscriber Birth Date Member ID       GALI CORTES 1945 [id number]           Secondary Coverage     Payor Plan Insurance Group Employer/Plan Group    CIG[employer] CIGNA  [employer]UTIONS           PLAN N     Payor Plan Address Payor Plan Phone Number Payor Plan Fax Number Effective Dates    [address] [address]   1/1/2018 - None Entered    [facility] [id number]       Subscriber Name Subscriber Birth Date Member ID       GALI CORTES 1945 [id number]                 Emergency Contacts      (Rel.) Home Phone Work Phone Mobile Phone    Les Cortes (Son) -- -- [phone]              "

## 2021-07-02 NOTE — PLAN OF CARE
Goal Outcome Evaluation:      Pt has very nauseated today. Zofran given as ordered. Pt has been coughing more and producing brown sputum. Pulmonology aware and orders put in. She is also requiring oxygen at 3L.

## 2021-07-02 NOTE — CASE MANAGEMENT/SOCIAL WORK
Continued Stay Note  SANDRA Christensen     Patient Name: Gali Dover  MRN: [id number]  Today's Date: 7/2/2021    Admit Date: 6/30/2021    Discharge Plan     Row Name 07/02/21 1218       Plan    Plan  D/C Plan: Home with [facility] (pending accceptance, order placed). RW and home oxygen from [facility] (orders placed).    Provided Post Acute Provider List?  Yes    Post Acute Provider List  Home Health    Delivered To  Patient    Method of Delivery  In person    Patient/Family in Agreement with Plan  yes    Plan Comments   spoke to patient at bedside wearing mask and goggles and keeping distance greater than 6 feet and spent less than 15 minutes in room. Patient agreeable to home health and son/granddaughter. Patient agreeable to [facility]-order placed and liaison notified. Patient wants RW and agreeable to [facility] for home oxygen needs-orders placed and liaison notified. CM updated bedside RN. Barrier to D/C: monitoring BP.          Expected Discharge Date and Time     Expected Discharge Date Expected Discharge Time    Jul 3, 2021             Danisha Mccarty

## 2021-07-02 NOTE — PROGRESS NOTES
Exercise Oximetry    Patient Name:Gali Dover   MRN: [id number]   Date: 07/02/21             ROOM AIR BASELINE   SpO2%  87   Heart Rate    Blood Pressure      EXERCISE ON ROOM AIR SpO2% EXERCISE ON O2 @  LPM SpO2%   1 MINUTE  1 MINUTE    2 MINUTES  2 MINUTES    3 MINUTES  3 MINUTES    4 MINUTES  4 MINUTES    5 MINUTES  5 MINUTES    6 MINUTES  6 MINUTES               Distance Walked   Distance Walked   Dyspnea (Ethel Scale)   Dyspnea (Ethel Scale)   Fatigue (Ethel Scale)   Fatigue (Ethel Scale)   SpO2% Post Exercise   SpO2% Post Exercise   HR Post Exercise   HR Post Exercise   Time to Recovery   Time to Recovery     Comments: Pt room air sat dropped to 87% while at rest in chair.  No walk performed.

## 2021-07-02 NOTE — PLAN OF CARE
Goal Outcome Evaluation:  Plan of Care Reviewed With: patient        Progress: no change  Outcome Summary: Patient resting in bed, no complaints at this time

## 2021-07-02 NOTE — PROGRESS NOTES
Name: Gali Dover ADMIT: 2021   : 1945  PCP: Chris Pedro MD    MRN: [id number] LOS: 2 days   AGE/SEX: 75 y.o. female  ROOM: [address] [place] [address]    [place], [facility]    No chief complaint on file.    CC: neck is sore  Subjective     75 y.o. female s/p left common carotid artery to subclavian artery bypass with PTFE.  Patient complains of neck soreness, appropriate.  No new focal neurologic symptoms.  Tolerating liquids and solids, some throat scratchiness after surgical intubation. Some nausea this am no vomiting. Cough noted.    Review of Systems  Denies focal neurologic symptoms  Denies cardiopulmonary complaints  Objective     Scheduled Medications:   aspirin, 325 mg, Oral, Daily  budesonide-formoterol, 2 puff, Inhalation, BID - RT  digoxin, 250 mcg, Oral, Nightly  enoxaparin, 40 mg, Subcutaneous, Daily  escitalopram, 10 mg, Oral, QAM  furosemide, 10 mg, Oral, QAM  gabapentin, 300 mg, Oral, TID  hydrALAZINE, 100 mg, Oral, TID  ipratropium, 0.5 mg, Nebulization, 4x Daily - RT  levothyroxine, 25 mcg, Oral, Q AM  losartan, 100 mg, Oral, QAM  metoprolol succinate XL, 100 mg, Oral, QAM  montelukast, 10 mg, Oral, Nightly  NIFEdipine XL, 60 mg, Oral, Q24H  pantoprazole, 40 mg, Oral, QAM        Active Infusions:  lactated ringers, 50 mL/hr, Last Rate: 50 mL/hr (21 1249)        As Needed Medications:  •  acetaminophen  •  albuterol sulfate HFA  •  aluminum-magnesium hydroxide-simethicone  •  HYDROcodone-acetaminophen  •  labetalol  •  magnesium sulfate **OR** magnesium sulfate in D5W 1g/100mL (PREMIX)  •  Morphine **AND** naloxone  •  nitroglycerin  •  ondansetron **OR** ondansetron  •  potassium chloride **OR** potassium chloride **OR** potassium chloride  •  potassium phosphate infusion greater than 15 mMoles **OR** potassium phosphate infusion greater than 15 mMoles **OR** potassium phosphate **OR** sodium phosphate IVPB **OR** sodium phosphate IVPB **OR** sodium phosphate  IVPB    Vital Signs  Vital Signs   Patient Vitals for the past 24 hrs:   BP Temp Temp src Pulse Resp SpO2   07/02/21 0500 (!) 183/63 97.5 °F (36.4 °C) Oral 88 18 92 %   07/02/21 0030 148/51 98.2 °F (36.8 °C) Oral 76 13 92 %   07/01/21 2100 157/51 98.2 °F (36.8 °C) Oral 70 16 91 %   07/01/21 1822 -- -- -- 63 18 96 %   07/01/21 1816 -- -- -- 68 18 95 %   07/01/21 1615 155/59 98.5 °F (36.9 °C) Oral 73 15 91 %   07/01/21 1407 159/55 99.1 °F (37.3 °C) Oral 67 15 97 %   07/01/21 1200 -- 99.7 °F (37.6 °C) Oral -- -- --   07/01/21 1130 -- -- -- 66 -- 100 %   07/01/21 1127 -- -- -- 65 16 96 %   07/01/21 0934 167/45 -- -- 70 -- --   07/01/21 0930 -- -- -- 73 -- 92 %   07/01/21 0925 -- -- -- 71 -- 93 %   07/01/21 0921 153/43 -- -- 67 -- --   07/01/21 0900 -- -- -- 68 -- 95 %   07/01/21 0800 -- 99.7 °F (37.6 °C) Oral -- -- --   07/01/21 0745 -- -- -- 78 -- --     I/O:     Intake/Output Summary (Last 24 hours) at 7/2/2021 0659  Last data filed at 7/2/2021 0151  Gross per 24 hour   Intake 1020 ml   Output --   Net 1020 ml     BMI:  Body mass index is 26.78 kg/m².    Physical Exam:  Physical Exam   Left sided incision site swollen, expected tenderness.  No new focal neurologic deficits.  Palpable radial pulse.  Results Review:     CBC    Results from last 7 days   Lab Units 07/02/21  0403 07/01/21  0550 06/30/21  1542   WBC 10*3/mm3 7.40 8.90 10.10   HEMOGLOBIN g/dL 9.8* 9.1* 10.1*   PLATELETS 10*3/mm3 234 243 262     BMP   Results from last 7 days   Lab Units 07/02/21  0403 07/01/21  0550 06/30/21  1542   SODIUM mmol/L 124* 130* 134*   POTASSIUM mmol/L 3.9 4.1 4.4   CHLORIDE mmol/L 91* 96* 99   CO2 mmol/L 23.0 23.0 23.0   BUN mg/dL 8 8 9   CREATININE mg/dL 0.60 0.69 0.73   GLUCOSE mg/dL 102* 102* 168*   MAGNESIUM mg/dL 1.6 1.7 1.4*   PHOSPHORUS mg/dL 3.4 3.7 4.2     Coag     HbA1C No results found for: HGBA1C  Infection     Radiology(recent) No radiology results for the last day    Assessment/Plan     Assessment & Plan       Subclavian steal syndrome of left subclavian artery    Bilateral carotid artery stenosis    Anxiety    Asthma    Paroxysmal atrial fibrillation (CMS/HCC)    Depression    GERD (gastroesophageal reflux disease)    Hyperlipidemia    Essential hypertension    Polymyalgia     Hypothyroid    Peripheral neuropathy    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 female POD #2  left common carotid artery to subclavian artery bypass with PTFE.  Tolerated procedure well.  Appropriate postsurgical soreness.  No new focal neurologic symptoms.  Blood pressure slightly elevated this a.m. requiring labetalol.  Hemodynamically stable.  Some nausea this a.m., Zofran given with some relief    Discussed with RN, morning meds had not been given at the time of visit, will continue to monitor with possible discharge this afternoon if medically ready.    Addendum 10:44am   Recheck regarding BP, spoke with RN, morning meds given with some improvement, elevation when working with therapy. Also, still requiring O2, now 3L. Discussed cough with RN, this is new since surgery. Encouraged incentive spirometer. RN discussing with hospitalist, wbc wnl, afebrile. Continue working on BP control, patient now requiring O2 with cough. Will continue to follow.        Personal protective equipment used for this patient encounter:  Patient wearing surgical mask []    Provider wearing a surgical mask [x]    Gloves [x]    Eye protection []    Face Shield []    Gown []    N 95 respirator or CAPR/PAPR []   Duration of interaction 10 minutes    Kendra Berumen PA-C  07/02/21  06:59 EDT    Please call my office with any question: [phone]    Active Hospital Problems    Diagnosis  POA   • **Subclavian steal syndrome of left subclavian artery [G45.8]  Yes   • Transaminitis [R74.01]  No   • Overweight (BMI 25.0-29.9) [E66.3]   Yes   • Normocytic anemia due to blood loss [D50.0]  No   • Bilateral carotid artery stenosis [I65.23]  Yes   • Asthma [J45.909]  Yes   • Depression [F32.9]  Yes   • Hyperlipidemia [E78.5]  Yes   • Polymyalgia  [M35.3]  Yes   • Hypothyroid [E03.9]  Yes   • Peripheral neuropathy [G62.9]  Yes   • Paroxysmal atrial fibrillation (CMS/HCC) [I48.0]  Yes   • 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

## 2021-07-02 NOTE — SIGNIFICANT NOTE
The patient's D-dimer came back negative but her chest x-ray came back consistent with congestive heart failure.  We will discontinue the Lasix 10 mg p.o. in favor of increasing it to 40 mg and utilizing it intravenously.  We will repeat blood work in the morning and hope that this will improve her oxygenation overall.  No indication at this time for her CTA.

## 2021-07-02 NOTE — THERAPY EVALUATION
Patient Name: Gali Dover  : 1945    MRN: [id number]                              Today's Date: 2021       Admit Date: 2021    Visit Dx:     ICD-10-CM ICD-9-CM   1. Subclavian steal syndrome of left subclavian artery  G45.8 435.2   2. 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)   • Dizziness on standing   • Pain in right knee   • Primary osteoarthritis of right knee   • Subclavian steal syndrome of left subclavian artery   • Transaminitis   • Overweight (BMI 25.0-29.9)   • Normocytic anemia due to blood loss     Past Medical History:   Diagnosis Date   • Anxiety    • Arteritis (CMS/HCC)    • Asthma    • Constipation     off and on   • COPD (chronic obstructive pulmonary disease) (CMS/HCC)    • Disease of thyroid gland    • Disorder of left eye    • Dry eyes    • GERD (gastroesophageal reflux disease)    • Hyperlipidemia    • Hypertension    • 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    • Neuropathy    • Stricture of artery (CMS/HCC) 2021     Past Surgical History:   Procedure Laterality Date   • CAROTID SUBCLAVIAN BYPASS Left 2021    Procedure: CAROTID SUBCLAVIAN BYPASS;  Surgeon: Navid Hassan MD;  Location: [facility];  Service: Vascular;  Laterality: Left;   •  SECTION     • EYE SURGERY      cat ext   • HARDWARE REMOVAL Right     knee   • HYSTERECTOMY      still has ovaries   • KIDNEY SURGERY      stent placed    • KNEE ARTHROSCOPY Right    • LAPAROSCOPIC CHOLECYSTECTOMY       General Information     Row  Name 07/02/21 1152          Physical Therapy Time and Intention    Document Type  evaluation  -     Mode of Treatment  physical therapy  -     Row Name 07/02/21 1152          General Information    Patient Profile Reviewed  yes  -     Prior Level of Function  independent:;ADL's;all household mobility;community mobility  -     Existing Precautions/Restrictions  oxygen therapy device and L/min;fall  -     Row Name 07/02/21 1152          Living Environment    Lives With  alone  -     Row Name 07/02/21 1152          Home Main Entrance    Number of Stairs, Main Entrance  two  -     Stair Railings, Main Entrance  railings safe and in good condition  -     Row Name 07/02/21 1152          Stairs Within Home, Primary    Number of Stairs, Within Home, Primary  none  -     Row Name 07/02/21 1152          Cognition    Orientation Status (Cognition)  oriented x 4  -     Row Name 07/02/21 1152          Safety Issues, Functional Mobility    Impairments Affecting Function (Mobility)  shortness of breath;strength;pain;balance;endurance/activity tolerance;sensation/sensory awareness  -       User Key  (r) = Recorded By, (t) = Taken By, (c) = Cosigned By    Initials Name Provider Type     Luz Martinez, PT Physical Therapist        Mobility     Row Name 07/02/21 1154          Bed Mobility    Bed Mobility  supine-sit  -     Supine-Sit [place] (Bed Mobility)  supervision  -     Comment (Bed Mobility)  painful, increased time to complete but does not require assist  -     Row Name 07/02/21 1154          Transfers    Comment (Transfers)  BP significantly increased to 172/52 upon standing and SaO2 80% on room air. Pt with increasing nausea and fatigue. Performed short ambulatory transfer to chair with hand held assist. Stopped activity due to increasing BP  -     Row Name 07/02/21 1154          Sit-Stand Transfer    Sit-Stand [place] (Transfers)  contact guard  -     Row Name 07/02/21 1154           Gait/Stairs (Locomotion)    Comment (Gait/Stairs)  not attempted, would require at least 4L supp O2 and would benefit from RW  -SS       User Key  (r) = Recorded By, (t) = Taken By, (c) = Cosigned By    Initials Name Provider Type    Luz Greenberg PT Physical Therapist        Obj/Interventions     Row Name 07/02/21 1201          Range of Motion Comprehensive    General Range of Motion  no range of motion deficits identified  -     Comment, General Range of Motion  able to don socks in sitting  -     Row Name 07/02/21 1201          Strength Comprehensive (MMT)    Comment, General Manual Muscle Testing (MMT) Assessment  B LE WFL  -     Row Name 07/02/21 1201          Balance    Balance Assessment  sitting static balance;standing static balance  -SS     Static Sitting Balance  WNL  -SS     Static Standing Balance  mild impairment  -     Comment, Balance  benefits from hand held A and would benefit from RW for standing  -[facility] Name 07/02/21 1201          Sensory Assessment (Somatosensory)    Sensory Assessment (Somatosensory)  -- neuropathy and sensation changes related to vascularization  -       User Key  (r) = Recorded By, (t) = Taken By, (c) = Cosigned By    Initials Name Provider Type    Luz Greenberg PT Physical Therapist        Goals/Plan     Row Name 07/02/21 1220          Bed Mobility Goal 1 (PT)    Activity/Assistive Device (Bed Mobility Goal 1, PT)  bed mobility activities, all  -SS     [place] Level/Cues Needed (Bed Mobility Goal 1, PT)  modified independence  -SS     Time Frame (Bed Mobility Goal 1, PT)  long term goal (LTG);2 weeks  -     Row Name 07/02/21 1220          Transfer Goal 1 (PT)    Activity/Assistive Device (Transfer Goal 1, PT)  transfers, all  -SS     [place] Level/Cues Needed (Transfer Goal 1, PT)  modified independence  -SS     Time Frame (Transfer Goal 1, PT)  long term goal (LTG);2 weeks  -     Row Name 07/02/21 1220          Gait  Training Goal 1 (PT)    Activity/Assistive Device (Gait Training Goal 1, PT)  gait (walking locomotion)  -SS     [place] Level (Gait Training Goal 1, PT)  independent  -SS     Distance (Gait Training Goal 1, PT)  75'  -SS     Time Frame (Gait Training Goal 1, PT)  long term goal (LTG);2 weeks  -SS     Row Name 07/02/21 1220          Stairs Goal 1 (PT)    Activity/Assistive Device (Stairs Goal 1, PT)  stairs, all skills  -SS     [place] Level/Cues Needed (Stairs Goal 1, PT)  modified independence  -SS     Number of Stairs (Stairs Goal 1, PT)  2  -SS     Time Frame (Stairs Goal 1, PT)  long term goal (LTG);2 weeks  -SS       User Key  (r) = Recorded By, (t) = Taken By, (c) = Cosigned By    Initials Name Provider Type    SS Luz Martinez, PT Physical Therapist        Clinical Impression     Row Name 07/02/21 1203          Pain    Additional Documentation  Pain Scale: FACES Pre/Post-Treatment (Group)  -SS     Row Name 07/02/21 1203          Pain Scale: FACES Pre/Post-Treatment    Pain: FACES Scale, Pretreatment  4-->hurts little more  -SS     Posttreatment Pain Rating  4-->hurts little more  -SS     Row Name 07/02/21 1203          Plan of Care Review    Plan of Care Reviewed With  patient  -SS     Outcome Summary  76 y/o F who POD 2 L common carotid to subclavian artery bypass. Patient lives alone and is typically independent at baseline with ADLs and mobility. She has been somewhat limited recently due to vascular issues, neuropathy and LE pain. Patient does not use supp O2 at baseline. This date SaO2 is 90% on 2L at rest. BP is 136/35 at rest. In sitting, /37. In standing, on room air SaO2 is 77-80% and BP is 172/52. Pt requires SBA for bed mobility, CGA for STS, hand held/min A for ambulator transfer to chair. Did not progress to ambulation due to significant BP increase in the setting of vascular surgery. Required titration to 3L to recover SaO2 to 95%. /43 sitting in recliner chair. Pt  is functioning significantly below her baseline currently and is limited by her medical presentation at this time. Anticipate as she improves medically, she will improve functionally enough to return home with assist from family and HHPT. Patient lives alone and does not have a plan for anyone to assist her upon d/c. If she is not able to get assistance from family, she may require d/c to IP rehab short term. Will progress as tolerated. Would benefit from trial of RW for gait next session.  -     Row Name 07/02/21 1203          Therapy Assessment/Plan (PT)    Rehab Potential (PT)  good, to achieve stated therapy goals  -     Criteria for Skilled Interventions Met (PT)  yes;meets criteria  -     Predicted Duration of Therapy Intervention (PT)  until d/c  -     Row Name 07/02/21 1203          Positioning and Restraints    Pre-Treatment Position  in bed  -     Post Treatment Position  chair  -SS     In Chair  exit alarm on  -       User Key  (r) = Recorded By, (t) = Taken By, (c) = Cosigned By    Initials Name Provider Type     Luz Martinez, PT Physical Therapist        Outcome Measures    No documentation.       Physical Therapy Education                 Title: PT OT SLP Therapies (Done)     Topic: Physical Therapy (Done)     Point: Mobility training (Done)     Learning Progress Summary           Patient Acceptance, E, VU by  at 7/2/2021 1221                               User Key     Initials Effective Dates Name Provider Type Discipline     06/16/21 -  Luz Martinez, PT Physical Therapist PT              PT Recommendation and Plan  Planned Therapy Interventions (PT): balance training, bed mobility training, gait training, home exercise program, patient/family education, strengthening, transfer training  Plan of Care Reviewed With: patient  Outcome Summary: 74 y/o F who POD 2 L common carotid to subclavian artery bypass. Patient lives alone and is typically independent at baseline with  ADLs and mobility. She has been somewhat limited recently due to vascular issues, neuropathy and LE pain. Patient does not use supp O2 at baseline. This date SaO2 is 90% on 2L at rest. BP is 136/35 at rest. In sitting, /37. In standing, on room air SaO2 is 77-80% and BP is 172/52. Pt requires SBA for bed mobility, CGA for STS, hand held/min A for ambulator transfer to chair. Did not progress to ambulation due to significant BP increase in the setting of vascular surgery. Required titration to 3L to recover SaO2 to 95%. /43 sitting in recliner chair. Pt is functioning significantly below her baseline currently and is limited by her medical presentation at this time. Anticipate as she improves medically, she will improve functionally enough to return home with assist from family and HHPT. Patient lives alone and does not have a plan for anyone to assist her upon d/c. If she is not able to get assistance from family, she may require d/c to IP rehab short term. Will progress as tolerated. Would benefit from trial of RW for gait next session.     Time Calculation:     Therapy Charges for Today     Code Description Service Date Service Provider Modifiers Qty    [id number] HC PT EVAL MOD COMPLEXITY 4 7/2/2021 Luz Martinez, PT GP 1               Luz Martinez, PT  7/2/2021

## 2021-07-02 NOTE — NURSING NOTE
Nurse reassessed patients Surgical incision, patient says she feels as if the swelling has gotten worse. Comparing to nurse's previous assessment. Site swelling doesn't really look to have gotten worse, but ice pack reapplied to site. Patient was asked if she feels like it is constricting airway in anyway. Patient says No. Will continue to monitor patient and surgical site.

## 2021-07-02 NOTE — PROGRESS NOTES
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)    Transaminitis    Overweight (BMI 25.0-29.9)    Normocytic anemia due to blood loss    Assessment    Dyspnea with hypoxia  Patient dropped to 87% on room air; continue with 3 liters    Subclavian steal syndrome of left subclavian artery  Bilateral carotid artery stenosis  -status post left common carotid artery to subclavian artery bypass with 6 mm PTFE ring supported graft (07/01/21)    Hyponatremia sodium today 124    Normocytic anemia due to blood loss  -hgb 9.8 today    Asthma / COPD (chronic obstructive pulmonary disease) with chronic bronchitis   -stable without exacerbation    Transaminitis  -,  today compared to 20 and 27 on 02/22/21  -discontinue statin      Plan    Chest x-ray suggestive of pulmonary edema  DC IV fluid LR  Increase Lasix    Increased left lower lobe density with low-grade fever cannot rule out early pneumonia  Start empiric antibiotic doxy (allergy PCN) for productive brown cough and temp yesterday 99    Diet advanced to healthy heart  Monitor labs and electrolytes  Duo-nebs/symbicort  DVT prophylaxis lovenox           LOS: 2 days       Subjective         Objective     Vital signs for last 24 hours:  Vitals:    07/01/21 2100 07/02/21 0030 07/02/21 0500 07/02/21 0815   BP: 157/51 148/51 (!) 183/63 179/65   BP Location: Left arm Left arm Left arm    Patient Position: Lying Lying Lying    Pulse: 70 76 88    Resp: 16 13 18 16   Temp: 98.2 °F (36.8 °C) 98.2 °F (36.8 °C) 97.5 °F (36.4 °C) 98.2 °F (36.8 °C)   TempSrc: Oral Oral Oral Oral   SpO2: 91% 92% 92% 91%   Weight:        Height:           Intake/Output last 3 shifts:  I/O last 3 completed shifts:  In: 1020 [P.O.:1020]  Out: 300 [Urine:300]  Intake/Output this shift:  No intake/output data recorded.      Radiology  Imaging Results (Last 24 Hours)     ** No results found for the last 24 hours. **          Labs:  Results from last 7 days   Lab Units 07/02/21  0403   WBC 10*3/mm3 7.40   HEMOGLOBIN g/dL 9.8*   HEMATOCRIT % 28.6*   PLATELETS 10*3/mm3 234     Results from last 7 days   Lab Units 07/02/21  0403   SODIUM mmol/L 124*   POTASSIUM mmol/L 3.9   CHLORIDE mmol/L 91*   CO2 mmol/L 23.0   BUN mg/dL 8   CREATININE mg/dL 0.60   CALCIUM mg/dL 8.4*   BILIRUBIN mg/dL 0.4   ALK PHOS U/L 34*   ALT (SGPT) U/L 128*   AST (SGOT) U/L 114*   GLUCOSE mg/dL 102*         Results from last 7 days   Lab Units 07/02/21  0403 07/01/21  0550 06/30/21  1542   ALBUMIN g/dL 3.80 3.80 4.00             Results from last 7 days   Lab Units 07/02/21  0403   MAGNESIUM mg/dL 1.6                   Meds:   SCHEDULE  aspirin, 325 mg, Oral, Daily  budesonide-formoterol, 2 puff, Inhalation, BID - RT  digoxin, 250 mcg, Oral, Nightly  enoxaparin, 40 mg, Subcutaneous, Daily  escitalopram, 10 mg, Oral, QAM  furosemide, 10 mg, Oral, QAM  gabapentin, 300 mg, Oral, TID  hydrALAZINE, 100 mg, Oral, TID  ipratropium, 0.5 mg, Nebulization, 4x Daily - RT  levothyroxine, 25 mcg, Oral, Q AM  losartan, 100 mg, Oral, QAM  metoprolol succinate XL, 100 mg, Oral, QAM  montelukast, 10 mg, Oral, Nightly  NIFEdipine XL, 60 mg, Oral, Q24H  pantoprazole, 40 mg, Oral, QAM      Infusions  lactated ringers, 50 mL/hr, Last Rate: 50 mL/hr (06/30/21 1249)      PRNs  •  acetaminophen  •  albuterol sulfate HFA  •  aluminum-magnesium hydroxide-simethicone  •  HYDROcodone-acetaminophen  •  labetalol  •  magnesium sulfate **OR** magnesium sulfate in D5W 1g/100mL (PREMIX)  •  Morphine **AND** naloxone  •  nitroglycerin  •  ondansetron **OR** ondansetron  •  potassium chloride **OR** potassium  chloride **OR** potassium chloride  •  potassium phosphate infusion greater than 15 mMoles **OR** potassium phosphate infusion greater than 15 mMoles **OR** potassium phosphate **OR** sodium phosphate IVPB **OR** sodium phosphate IVPB **OR** sodium phosphate IVPB    Physical Exam:  Physical Exam  Pulmonary:      Breath sounds: Examination of the right-middle field reveals decreased breath sounds. Examination of the left-middle field reveals decreased breath sounds. Examination of the right-lower field reveals decreased breath sounds. Examination of the left-lower field reveals decreased breath sounds. Decreased breath sounds present.   Skin:     General: Skin is warm and dry.      Coloration: Skin is pale.   Neurological:      Mental Status: She is alert and oriented to person, place, and time.         ROS  Review of Systems   Respiratory: Positive for cough.        I reviewed the patient's new clinical results.      Electronically signed by RICKY Thomas, 07/02/21 at 10:55 EDT.

## 2021-07-02 NOTE — PROGRESS NOTES
"      [facility] Medicine Services Daily Progress Note      Hospitalist Team  LOS 2 days      Patient Care Team:  Chris Pedro MD as PCP - General (Internal Medicine)  Navid Hassan MD as Surgeon (Vascular Surgery)  Rubén Avitia MD as Consulting Physician (Cardiology)  Dante Langston MD as Consulting Physician (Rheumatology)  Laurita Swanson APRN as Consulting Physician (Nurse Practitioner)  Carmelita Cruz MD as Consulting Physician (Pulmonary Disease)    Patient Location: 4110/1      Subjective   Subjective     Chief Complaint / Subjective  No chief complaint on file.        Brief Synopsis of Hospital Course/HPI  The patient is a 75-year-old female who has had subclavian steal syndrome.  The patient presented and underwent corrective surgery for this issue.  She is now having issues with nausea and vomiting as well as with hypertension and hyponatremia.      Date::          Review of Systems   Constitutional: Negative.   HENT: Negative.         The patient is having some pain in the area of the incisions and recent surgery.   Eyes: Negative.    Cardiovascular: Negative.    Respiratory: Negative.    Endocrine: Negative.    Hematologic/Lymphatic: Negative.    Skin: Negative.    Musculoskeletal: Negative.    Gastrointestinal: Positive for nausea and vomiting.   Genitourinary: Negative.    Neurological: Negative.    Psychiatric/Behavioral: Negative.    Allergic/Immunologic: Negative.    All other systems reviewed and are negative.        Objective   Objective      Vital Signs  Temp:  [97.5 °F (36.4 °C)-98.5 °F (36.9 °C)] 98.2 °F (36.8 °C)  Heart Rate:  [63-88] 73  Resp:  [13-18] 16  BP: (148-183)/(51-65) 154/56  Oxygen Therapy  SpO2: 95 %  Pulse Oximetry Type: Intermittent  Device (Oxygen Therapy): nasal cannula  Flow (L/min): 3  Flowsheet Rows      First Filed Value   Admission Height  165.1 cm (65\") Documented at 06/30/2021 0551   Admission Weight  73.3 kg (161 lb 9.6 oz) " Documented at 06/30/2021 0551        Intake & Output (last 3 days)       06/29 0701 - 06/30 0700 06/30 0701 - 07/01 0700 07/01 0701 - 07/02 0700 07/02 0701 - 07/03 0700    P.O.   1020 360    I.V. (mL/kg)  500 (6.8)      Total Intake(mL/kg)  500 (6.8) 1020 (14) 360 (4.9)    Urine (mL/kg/hr)  300 (0.2)      Blood  25      Total Output  325      Net  +175 +1020 +360            Urine Unmeasured Occurrence   2 x         Lines, Drains & Airways    Active LDAs     Name:   Placement date:   Placement time:   Site:   Days:    Peripheral IV 06/30/21 0625 Posterior;Right Wrist   06/30/21 0625    Wrist   2                  Physical Exam:  Physical Exam  Vitals and nursing note reviewed.   Constitutional:       General: She is not in acute distress.     Appearance: Normal appearance. She is well-developed. She is ill-appearing. She is not toxic-appearing or diaphoretic.      Comments: The patient was quite nauseated at the time of my visit today.  She said she was feeling little bit better after she received some Zofran.     HENT:      Head: Normocephalic and atraumatic.      Right Ear: Ear canal and external ear normal.      Left Ear: Ear canal and external ear normal.      Nose: Nose normal. No congestion or rhinorrhea.      Mouth/Throat:      Mouth: Mucous membranes are moist.      Pharynx: No oropharyngeal exudate.   Eyes:      General: No scleral icterus.        Right eye: No discharge.         Left eye: No discharge.      Extraocular Movements: Extraocular movements intact.      Conjunctiva/sclera: Conjunctivae normal.      Pupils: Pupils are equal, round, and reactive to light.   Neck:      Thyroid: No thyromegaly.      Vascular: No carotid bruit or JVD.      Trachea: No tracheal deviation.   Cardiovascular:      Rate and Rhythm: Normal rate and regular rhythm.      Pulses: Normal pulses.      Heart sounds: Normal heart sounds. No murmur heard.   No friction rub. No gallop.    Pulmonary:      Effort: Pulmonary effort  is normal. No respiratory distress.      Breath sounds: Normal breath sounds. No stridor. No wheezing, rhonchi or rales.   Chest:      Chest wall: No tenderness.   Abdominal:      General: Bowel sounds are normal. There is no distension.      Palpations: Abdomen is soft. There is no mass.      Tenderness: There is no abdominal tenderness. There is no guarding or rebound.      Hernia: No hernia is present.   Musculoskeletal:         General: No swelling, tenderness, deformity or signs of injury. Normal range of motion.      Cervical back: Normal range of motion and neck supple. No rigidity. No muscular tenderness.      Right lower leg: No edema.      Left lower leg: No edema.   Lymphadenopathy:      Cervical: No cervical adenopathy.   Skin:     General: Skin is warm and dry.      Coloration: Skin is not jaundiced or pale.      Findings: No bruising, erythema or rash.   Neurological:      General: No focal deficit present.      Mental Status: She is alert and oriented to person, place, and time. Mental status is at baseline.      Cranial Nerves: No cranial nerve deficit.      Sensory: No sensory deficit.      Motor: No weakness or abnormal muscle tone.      Coordination: Coordination normal.   Psychiatric:         Mood and Affect: Mood normal.         Behavior: Behavior normal.         Thought Content: Thought content normal.         Judgment: Judgment normal.              Wounds (last 24 hours)      LDA Wound     Row Name 07/02/21 0725 07/01/21 2015          Wound 06/30/21 0941 Left neck Incision    Wound - Properties Group Placement Date: 06/30/21  -LT Placement Time: 0941  -LT Side: Left  -LT Location: neck  -LT Primary Wound Type: Incision  -LT    Dressing Appearance  open to air;no drainage  -KS  dry;intact  -TT     Closure  Liquid skin adhesive  -KS  Liquid skin adhesive  -TT     Base  pink  -KS  --     Periwound  intact  -KS  --     Periwound Temperature  warm  -KS  --     Retired Wound - Properties Group Date  first assessed: 06/30/21  -LT Time first assessed: 0941  -LT Side: Left  -LT Location: neck  -LT Primary Wound Type: Incision  -LT      User Key  (r) = Recorded By, (t) = Taken By, (c) = Cosigned By    Initials Name Provider Type    Julio Stein, RN Registered Nurse    Michelle Cruz RN Registered Nurse    Gabriella Manriquez RN Registered Nurse      Procedures:  Procedure(s):  CAROTID SUBCLAVIAN BYPASS    Results Review:     I reviewed the patient's new clinical results.      Lab Results (last 24 hours)     Procedure Component Value Units Date/Time    D-dimer, Quantitative [[id number]]  (Normal) Collected: 07/02/21 1323    Specimen: Blood Updated: 07/02/21 1415     D-Dimer, Quantitative 0.52 mg/L (FEU)     Narrative:      Reference Range  --------------------------------------------------------------------     < 0.50   Negative Predictive Value  0.50-0.59   Indeterminate    >= 0.60   Probable VTE             A very low percentage of patients with DVT may yield D-Dimer results   below the cut-off of 0.50 mg/L FEU.  This is known to be more   prevalent in patients with distal DVT.             Results of this test should always be interpreted in conjunction with   the patient's medical history, clinical presentation and other   findings.  Clinical diagnosis should not be based on the result of   INNOVANCE D-Dimer alone.    Comprehensive Metabolic Panel [[id number]]  (Abnormal) Collected: 07/02/21 0403    Specimen: Blood Updated: 07/02/21 0451     Glucose 102 mg/dL      BUN 8 mg/dL      Creatinine 0.60 mg/dL      Sodium 124 mmol/L      Potassium 3.9 mmol/L      Chloride 91 mmol/L      CO2 23.0 mmol/L      Calcium 8.4 mg/dL      Total Protein 5.5 g/dL      Albumin 3.80 g/dL      ALT (SGPT) 128 U/L      AST (SGOT) 114 U/L      Alkaline Phosphatase 34 U/L      Total Bilirubin 0.4 mg/dL      eGFR Non African Amer 97 mL/min/1.73      Globulin 1.7 gm/dL      A/G Ratio 2.2 g/dL      BUN/Creatinine Ratio 13.3      Anion Gap 10.0 mmol/L     Narrative:      GFR Normal >60  Chronic Kidney Disease <60  Kidney Failure <15      Phosphorus [[id number]]  (Normal) Collected: 07/02/21 0403    Specimen: Blood Updated: 07/02/21 0451     Phosphorus 3.4 mg/dL     Magnesium [[id number]]  (Normal) Collected: 07/02/21 0403    Specimen: Blood Updated: 07/02/21 0451     Magnesium 1.6 mg/dL     CBC & Differential [[id number]]  (Abnormal) Collected: 07/02/21 0403    Specimen: Blood Updated: 07/02/21 0421    Narrative:      The following orders were created for panel order CBC & Differential.  Procedure                               Abnormality         Status                     ---------                               -----------         ------                     CBC Auto Differential[[id number]]        Abnormal            Final result                 Please view results for these tests on the individual orders.    CBC Auto Differential [[id number]]  (Abnormal) Collected: 07/02/21 0403    Specimen: Blood Updated: 07/02/21 0421     WBC 7.40 10*3/mm3      RBC 3.24 10*6/mm3      Hemoglobin 9.8 g/dL      Hematocrit 28.6 %      MCV 88.3 fL      MCH 30.2 pg      MCHC 34.2 g/dL      RDW 13.7 %      RDW-SD 41.6 fl      MPV 8.4 fL      Platelets 234 10*3/mm3      Neutrophil % 58.1 %      Lymphocyte % 23.3 %      Monocyte % 9.8 %      Eosinophil % 7.4 %      Basophil % 1.4 %      Neutrophils, Absolute 4.30 10*3/mm3      Lymphocytes, Absolute 1.70 10*3/mm3      Monocytes, Absolute 0.70 10*3/mm3      Eosinophils, Absolute 0.50 10*3/mm3      Basophils, Absolute 0.10 10*3/mm3      nRBC 0.0 /100 WBC     Digoxin Level [[id number]]  (Normal) Collected: 07/01/21 2019    Specimen: Blood Updated: 07/01/21 2052     Digoxin 1.00 ng/mL         No results found for: HGBA1C            No results found for: LIPASE  Lab Results   Component Value Date    CHOL 150 07/01/2021    CHLPL 191 08/03/2020    TRIG 131 07/01/2021    HDL 34 (L) 07/01/2021    LDL 92 07/01/2021       Lab  Results   Lab Value Date/Time    FINALDX  11/11/2020 0000     1. Oral Cavity Upper Roof of Mouth, Biopsy: Benign squamous epithelium with   A. Reactive changes (Focal parakeratosis)   B. No dysplasia.     Juan Diego/kds         Microbiology Results (last 10 days)     Procedure Component Value - Date/Time    COVID PRE-OP / PRE-PROCEDURE SCREENING ORDER (NO ISOLATION) - Swab, Nasopharynx [[id number]]  (Normal) Collected: 06/28/21 1151    Lab Status: Final result Specimen: Swab from Nasopharynx Updated: 06/28/21 2005    Narrative:      The following orders were created for panel order COVID PRE-OP / PRE-PROCEDURE SCREENING ORDER (NO ISOLATION) - Swab, Nasopharynx.  Procedure                               Abnormality         Status                     ---------                               -----------         ------                     COVID-19,APTIMA PANTHER,...[[id number]]  Normal              Final result                 Please view results for these tests on the individual orders.    COVID-19,APTIMA PANTHER,[facility]ENN[facility] IN-HOUSE, NP/OP SWAB IN UTM/VTM/SALINE TRANSPORT MEDIA,24 HR TAT - Swab, Nasopharynx [[id number]]  (Normal) Collected: 06/28/21 1151    Lab Status: Final result Specimen: Swab from Nasopharynx Updated: 06/28/21 2005     COVID19 Not Detected    Narrative:      Fact sheet for providers: https://www.fda.gov/media/352375/download     Fact sheet for patients: https://www.fda.gov/media/744332/download    Test performed by RT PCR.          ECG/EMG Results (most recent)     None          Results for orders placed during the hospital encounter of 06/01/21    Duplex carotid ultrasound CAR    Interpretation Summary  · Proximal right internal carotid artery moderate stenosis.  · Proximal left internal carotid artery moderate stenosis.  · Left Vertebral: Retrograde flow noted.          XR Chest 1 View    Result Date: 7/2/2021   1. Increased congestive changes have developed throughout the lungs and central hilar areas. No large  effusion is noted.   Electronically Signed By-Steven Mayers MD On:7/2/2021 12:49 PM This report was finalized on [id number] by  Steven Mayers MD.          Xrays, labs reviewed personally by physician.    Medication Review:   I have reviewed the patient's current medication list      Scheduled Meds  aspirin, 325 mg, Oral, Daily  budesonide-formoterol, 2 puff, Inhalation, BID - RT  digoxin, 250 mcg, Oral, Nightly  doxycycline, 100 mg, Oral, Q12H  enoxaparin, 40 mg, Subcutaneous, Daily  escitalopram, 10 mg, Oral, QAM  furosemide, 10 mg, Oral, QAM  gabapentin, 300 mg, Oral, TID  guaiFENesin, 600 mg, Oral, Q12H  hydrALAZINE, 100 mg, Oral, TID  ipratropium, 0.5 mg, Nebulization, 4x Daily - RT  levothyroxine, 25 mcg, Oral, Q AM  losartan, 100 mg, Oral, QAM  metoprolol succinate XL, 100 mg, Oral, QAM  montelukast, 10 mg, Oral, Nightly  NIFEdipine XL, 60 mg, Oral, Q24H  pantoprazole, 40 mg, Oral, QAM        Meds Infusions       Meds PRN  •  acetaminophen  •  albuterol sulfate HFA  •  HYDROcodone-acetaminophen  •  labetalol  •  magnesium sulfate **OR** magnesium sulfate in D5W 1g/100mL (PREMIX)  •  Morphine **AND** naloxone  •  nitroglycerin  •  ondansetron **OR** ondansetron  •  potassium chloride **OR** potassium chloride **OR** potassium chloride  •  potassium phosphate infusion greater than 15 mMoles **OR** potassium phosphate infusion greater than 15 mMoles **OR** potassium phosphate **OR** sodium phosphate IVPB **OR** sodium phosphate IVPB **OR** sodium phosphate IVPB        Assessment/Plan   Assessment/Plan     Active Hospital Problems    Diagnosis  POA   • **Subclavian steal syndrome of left subclavian artery [G45.8]  Yes   • Transaminitis [R74.01]  No   • Overweight (BMI 25.0-29.9) [E66.3]  Yes   • Normocytic anemia due to blood loss [D50.0]  No   • Bilateral carotid artery stenosis [I65.23]  Yes   • Asthma [J45.909]  Yes   • Depression [F32.9]  Yes   • Hyperlipidemia [E78.5]  Yes   • Polymyalgia  [M35.3]   Yes   • Hypothyroid [E03.9]  Yes   • Peripheral neuropathy [G62.9]  Yes   • Paroxysmal atrial fibrillation (CMS/[facility]) [I48.0]  Yes   • COPD (chronic obstructive pulmonary disease) with chronic bronchitis (CMS/[facility]) [J44.9]  Yes   • Anxiety [F41.9]  Yes   • GERD (gastroesophageal reflux disease) [K21.9]  Yes   • Essential hypertension [I10]  Yes      Resolved Hospital Problems   No resolved problems to display.       Paola Farley APRN   Nurse Practitioner   Hospitalist   Consults       Attested   Date of Service:  21 1325   Creation Time:  21 132         Consult Orders   Inpatient Hospitalist Consult [[id number]] ordered by Jaime Skaggs APRN at 21 1120          Attested        Attestation signed by Janina Scott MD at 21 1927   Attending Physician Attestation     I have reviewed the mid-level provider documentation, agree with the documentation, medical decision making and treatment plan as outlined by the mid-level provider. I have reviewed this documentation and agree.         Expand AllCollapse All      Show:Clear all  [x]Manual[x]Template[]Copied    Added by:  [x]Paola Farley APRN    []Marcia for details          [facility] Medicine Services        Patient Name: Gali Dover  : 1945  MRN: [id number]  Primary Care Physician: Chris Pedro MD  Date of admission: 2021     Patient Care Team:  Chris Pedro MD as PCP - General (Internal Medicine)  Navid Hassan MD as Surgeon (Vascular Surgery)  Rubén Avitia MD as Consulting Physician (Cardiology)  Dante Langston MD as Consulting Physician (Rheumatology)  Laurita Swanson APRN as Consulting Physician (Nurse Practitioner)  Carmelita Cruz MD as Consulting Physician (Pulmonary Disease)                 Subjective      History Present Illness      Chief Complaint:  Post-op pain        Ms. Dover is a 75 y.o. female with a history of left subclavian artery steal syndrome and bilateral  carotid artery stenosis who presented to [facility] on 2021 and underwent left common carotid artery to subclavian artery bypass with 6 mm PTFE ring supported graft by Dr. Hassan. The patient was admitted to the ICU postoperatively.         21:  The patient was downgraded to monitored SIPS bed and the Hospitalist team was consulted for medical management. The patient's labs reveal acute transaminitis. The patient denies any abdominal pain, nausea or vomiting. She states she feels tired and is complaining of left neck pain at her incision site. She denies any other complaints.       2021  Today the patient was having difficulty getting situated where she was comfortable.  She was assisted and was comfortable by the time that I left.  She was still having some nausea and had just received some medication for this issue.     Review of Systems   Constitutional: Positive for malaise/fatigue.   Musculoskeletal: Positive for neck pain.   All other systems reviewed and are negative.           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 Family  History was reviewed.      Social History:  reports that she has quit smoking. She does not have any smokeless tobacco history on file. She reports current alcohol use. She reports previous drug use.        Medications:          Prior to Admission medications    Medication Sig Start Date End Date Taking? Authorizing Provider   albuterol sulfate  (90 Base) MCG/ACT inhaler Inhale 2 puffs Every 4 (Four) Hours As Needed for Wheezing. Use or bring dos     Yes Purvi Ko MD   aspirin 325 MG tablet Take 325 mg by mouth Daily. Was told ok day before surgery, but not to take dos per garima at Dr. Hassan's office     Yes Purvi Ko MD   azelastine (ASTELIN) 0.1 % nasal spray 1 spray into the nostril(s) as directed by provider 2 (Two) Times a Day. Use in each nostril as directed     Yes Purvi Ko MD   budesonide-formoterol (SYMBICORT) 160-4.5 MCG/ACT inhaler Inhale 2 puffs 2 (Two) Times a Day. Use and bring dos     Yes Purvi Ko MD   calcium carbonate (OS-VANDANA) 600 MG tablet Take 600 mg by mouth Daily. Stop 6-23     Yes Purvi Ko MD   Cholecalciferol (Vitamin D) 50 MCG (2000 UT) capsule Take  by mouth. Stop 6-23     Yes Purvi Ko MD   cycloSPORINE (RESTASIS) 0.05 % ophthalmic emulsion 1 drop 2 (Two) Times a Day. Use dos     Yes Purvi Ko MD   digoxin (LANOXIN) 250 MCG tablet Take 250 mcg by mouth every night at bedtime.     Yes Purvi Ko MD   escitalopram (LEXAPRO) 10 MG tablet Take 10 mg by mouth Every Morning. Take dos     Yes Purvi Ko MD   ezetimibe (Zetia) 10 MG tablet Take 10 mg by mouth every night at bedtime.     Yes Purvi Ko MD   fluticasone (FLONASE) 50 MCG/ACT nasal spray 2 sprays into the nostril(s) as directed by provider Every Morning.     Yes Purvi Ko MD   furosemide (LASIX) 20 MG tablet Take 10 mg by mouth Every Morning. Do not take dos     Yes Purvi Ko MD    gabapentin (NEURONTIN) 300 MG capsule Take 300 mg by mouth 3 (Three) Times a Day.     Yes Purvi Ko MD   hydrALAZINE (APRESOLINE) 100 MG tablet Take 100 mg by mouth 3 (Three) Times a Day. Take dos     Yes Purvi Ko MD   immune globulin, human, (GAMMAGARD) 1 GM/10ML solution infusion Infuse  into a venous catheter 1 (One) Time.     Yes Purvi Ko MD   levothyroxine (SYNTHROID, LEVOTHROID) 25 MCG tablet Take 25 mcg by mouth Every Morning. Take dos     Yes Purvi Ko MD   losartan (COZAAR) 100 MG tablet Take 100 mg by mouth Every Morning. LD 6-29-21 before 0800     Yes Purvi Ko MD   loteprednol (LOTEMAX) 0.5 % ophthalmic suspension Administer 1 drop into the left eye every night at bedtime.     Yes Purvi Ko MD   metoprolol succinate XL (TOPROL-XL) 100 MG 24 hr tablet Take 100 mg by mouth Every Morning. Take dos     Yes Purvi Ko MD   montelukast (SINGULAIR) 10 MG tablet Take 10 mg by mouth Every Night.     Yes Purvi Ko MD   NIFEdipine XL (PROCARDIA XL) 60 MG 24 hr tablet Take 60 mg by mouth 2 (two) times a day. Take dos     Yes Purvi Ko MD   omeprazole (PrilOSEC) 20 MG capsule Take 20 mg by mouth Every Morning. Take dos     Yes Purvi Ko MD   Probiotic Product (PROBIOTIC PO) Take  by mouth.     Yes Purvi Ko MD   simvastatin (ZOCOR) 40 MG tablet Take 40 mg by mouth Every Night.     Yes Purvi Ko MD   tiotropium (SPIRIVA) 18 MCG per inhalation capsule Place 1 capsule into inhaler and inhale Every Morning. Use and bring dos     Yes Purvi Ko MD   Tocilizumab (Actemra) 162 MG/0.9ML solution prefilled syringe Inject 1 syringe under the skin into the appropriate area as directed 1 (One) Time Per Week. Monday     Yes Purvi Ko MD   fluconazole (DIFLUCAN) 200 MG tablet Take 200 mg by mouth every night at bedtime.       Purvi Ko MD          Allergies:         Allergies   Allergen Reactions   • Crestor [Rosuvastatin] Myalgia   • Lipitor [Atorvastatin] Myalgia   • Penicillins Hives   • Hctz [Hydrochlorothiazide] Rash               Objective      Objective      Vital Signs  Temp:  [97.7 °F (36.5 °C)-99.7 °F (37.6 °C)] 99.7 °F (37.6 °C)  Heart Rate:  [65-90] 66  Resp:  [16] 16  BP: (127-167)/(40-45) 167/45  Arterial Line BP: (103-173)/(35-54) 173/48  SpO2:  [92 %-100 %] 100 %  on  Flow (L/min):  [2-4] 2;   Device (Oxygen Therapy): nasal cannula  Body mass index is 26.78 kg/m².     Physical Exam  Vitals reviewed.   Constitutional:       Appearance: She is overweight.   HENT:      Head: Normocephalic.   Eyes:      Extraocular Movements: Extraocular movements intact.      Conjunctiva/sclera: Conjunctivae normal.      Pupils: Pupils are equal, round, and reactive to light.   Cardiovascular:      Rate and Rhythm: Normal rate and regular rhythm.      Pulses: Normal pulses.   Pulmonary:      Effort: Pulmonary effort is normal.      Breath sounds: Normal breath sounds.      Comments: O2 @ 2 L per NC  Abdominal:      General: Bowel sounds are normal. There is no distension.      Palpations: Abdomen is soft.      Tenderness: There is no abdominal tenderness.   Musculoskeletal:         General: Normal range of motion.      Cervical back: Normal range of motion.      Right lower leg: No edema.      Left lower leg: No edema.   Skin:     Findings: Bruising present.      Comments: Left neck surgical incision approximated, dry  leck neck/upper chest bruised   Neurological:      Mental Status: She is oriented to person, place, and time.      Comments: drowsy          Results Review:  I have personally reviewed most recent cardiac tracings, lab results and radiology images and interpretations and agree with findings.          Results from last 7 days   Lab Units 07/01/21  0550   WBC 10*3/mm3 8.90   HEMOGLOBIN g/dL 9.1*   HEMATOCRIT % 26.5*   PLATELETS 10*3/mm3 243            Results from last 7 days   Lab Units 07/01/21  0550   SODIUM mmol/L 130*   POTASSIUM mmol/L 4.1   CHLORIDE mmol/L 96*   CO2 mmol/L 23.0   BUN mg/dL 8   CREATININE mg/dL 0.69   GLUCOSE mg/dL 102*   CALCIUM mg/dL 8.0*   ALT (SGPT) U/L 167*   AST (SGOT) U/L 278*      Estimated Creatinine Clearance: 59.9 mL/min (by C-G formula based on SCr of 0.69 mg/dL).      Brief Urine Lab Results      None                      Microbiology Results (last 10 days)      Procedure Component Value - Date/Time     COVID PRE-OP / PRE-PROCEDURE SCREENING ORDER (NO ISOLATION) - Swab, Nasopharynx [[id number]]  (Normal) Collected: 06/28/21 1151     Lab Status: Final result Specimen: Swab from Nasopharynx Updated: 06/28/21 2005     Narrative:       The following orders were created for panel order COVID PRE-OP / PRE-PROCEDURE SCREENING ORDER (NO ISOLATION) - Swab, Nasopharynx.  Procedure                               Abnormality         Status                     ---------                               -----------         ------                     COVID-19,APTIMA PANTHER,...[[id number]]  Normal              Final result                  Please view results for these tests on the individual orders.     COVID-19,APTIMA PANTHER,[facility] IN-HOUSE, NP/OP SWAB IN UTM/VTM/SALINE TRANSPORT MEDIA,24 HR TAT - Swab, Nasopharynx [[id number]]  (Normal) Collected: 06/28/21 1151     Lab Status: Final result Specimen: Swab from Nasopharynx Updated: 06/28/21 2005       COVID19 Not Detected     Narrative:       Fact sheet for providers: https://www.fda.gov/media/298969/download      Fact sheet for patients: https://www.fda.gov/media/149600/download     Test performed by RT PCR.                 ECG/EMG Results (most recent)      None             Results for orders placed during the hospital encounter of 06/01/21     Duplex carotid ultrasound CAR     Interpretation Summary  · Proximal right internal carotid artery moderate stenosis.  · Proximal left internal  carotid artery moderate stenosis.  · Left Vertebral: Retrograde flow noted.        No radiology results for the last 7 days        Estimated Creatinine Clearance: 59.9 mL/min (by C-G formula based on SCr of 0.69 mg/dL).              Assessment/Plan      Assessment/Plan               Active Hospital Problems     Diagnosis   POA   • **Subclavian steal syndrome of left subclavian artery [G45.8]   Yes   • Transaminitis [R74.01]   No   • Overweight (BMI 25.0-29.9) [E66.3]   Yes   • Normocytic anemia due to blood loss [D50.0]   No   • Bilateral carotid artery stenosis [I65.23]   Yes   • Asthma [J45.909]   Yes   • Depression [F32.9]   Yes   • Hyperlipidemia [E78.5]   Yes   • Polymyalgia  [M35.3]   Yes   • Hypothyroid [E03.9]   Yes   • Peripheral neuropathy [G62.9]   Yes   • Paroxysmal atrial fibrillation (CMS/HCC) [I48.0]   Yes   • 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         Subclavian steal syndrome of left subclavian artery  Bilateral carotid artery stenosis  -status post left common carotid artery to subclavian artery bypass with 6 mm PTFE ring supported graft (07/01/21)  -post-op care per vascular surgery   -on aspirin  -statin discontinued to transaminitis  -PRN analgesia per surgery      Transaminitis  -,  today compared to 20 and 27 on 02/22/21  -discontinue statin  -patient denies abdominal pain, N/V  -monitor and trend LFTs        -Today the transaminases are much improved.    Normocytic anemia due to blood loss  -hgb 9.1 today  -monitor and trend      Asthma / COPD (chronic obstructive pulmonary disease) with chronic bronchitis   -stable without exacerbation  -continue Symbicort and albuterol   -Atrovent substituted for Spiriva   -continue Singulair       Paroxysmal atrial fibrillation  -currently SR  -continue aspirin, digoxin, metoprolol XL, and Procardia XL     Depression / Anxiety  -continue Lexapro      GERD (gastroesophageal reflux disease)  -continue PPI     Hyperlipidemia  -statin discontinued as above  -Zetia non-formulary      Essential hypertension, chronic  -continue Lasix, hydralazine, losartan, metoprolol XL, and Procardia XL  -monitor BP     Polymyalgia, Peripheral neuropathy  -PRN analgesia as above  -continue gabapentin      Hypothyroid  -continue levothyroxine     Overweight (BMI 25.0-29.9)  -BMI 26.78 on admission  -encourage lifestyle modifications              VTE Prophylaxis -       Mechanical Order History:      None               Pharmalogical Order History:                   Ordered     Dose Route Frequency Stop      06/30/21 1129   enoxaparin (LOVENOX) syringe 40 mg      40 mg SC Daily --      06/30/21 0750   sodium chloride 500 mL with heparin (porcine) 5000 UNIT/ML 5,000 Units mixture  Status:  Discontinued      -- -- As Needed 06/30/21 1152                     CODE STATUS:        Code Status and Medical Interventions:   Ordered at: 06/30/21 1130     Code Status:     CPR     Medical Interventions (Level of Support Prior to Arrest):     Full         This patient has been examined wearing appropriate Personal Protective Equipment. 07/01/21        Signature:  Electronically signed by RICKY Chamberlain, 07/01/21, 2:10 PM EDT.  [facility] Hospitalist Team                           Cosigned by: Janina Scott MD at 07/01/21 6507   Revision History                    MEDICAL DECISION MAKING COMPLEXITY BY PROBLEM:       VTE Prophylaxis -   Mechanical Order History:     None      Pharmalogical Order History:      Ordered     Dose Route Frequency Stop    06/30/21 1129  enoxaparin (LOVENOX) syringe 40 mg      40 mg SC Daily --    06/30/21 0750  sodium chloride 500 mL with heparin (porcine) 5000 UNIT/ML 5,000 Units mixture  Status:   Discontinued      -- -- As Needed 06/30/21 1152              Code Status -   Code Status and Medical Interventions:   Ordered at: 06/30/21 1130     Code Status:    CPR     Medical Interventions (Level of Support Prior to Arrest):    Full     This patient has been examined wearing appropriate Personal Protective Equipment and discussed with hospital infection control department. 07/02/21    Discharge Planning    Electronically signed by Janina Scott MD, 07/02/21, 15:41 EDT.  [facility] Hospitalist Team

## 2021-07-02 NOTE — PLAN OF CARE
Goal Outcome Evaluation:  Plan of Care Reviewed With: patient           Outcome Summary: 76 y/o F who POD 2 L common carotid to subclavian artery bypass. Patient lives alone and is typically independent at baseline with ADLs and mobility. She has been somewhat limited recently due to vascular issues, neuropathy and LE pain. Patient does not use supp O2 at baseline. This date SaO2 is 90% on 2L at rest. BP is 136/35 at rest. In sitting, /37. In standing, on room air SaO2 is 77-80% and BP is 172/52. Pt requires SBA for bed mobility, CGA for STS, hand held/min A for ambulator transfer to chair. Did not progress to ambulation due to significant BP increase in the setting of vascular surgery. Required titration to 3L to recover SaO2 to 95%. /43 sitting in recliner chair. Pt is functioning significantly below her baseline currently and is limited by her medical presentation at this time. Anticipate as she improves medically, she will improve functionally enough to return home with assist from family and HHPT. Patient lives alone and does not have a plan for anyone to assist her upon d/c. If she is not able to get assistance from family, she may require d/c to IP rehab short term. Will progress as tolerated. Would benefit from trial of RW for gait next session.  Will need 6 min walk test to determine how much O2 patient will need for activity at d/c.

## 2021-07-03 LAB
ALBUMIN SERPL-MCNC: 3.7 G/DL (ref 3.5–5.2)
ALBUMIN/GLOB SERPL: 2.1 G/DL
ALP SERPL-CCNC: 31 U/L (ref 39–117)
ALT SERPL W P-5'-P-CCNC: 87 U/L (ref 1–33)
ANION GAP SERPL CALCULATED.3IONS-SCNC: 9 MMOL/L (ref 5–15)
AST SERPL-CCNC: 63 U/L (ref 1–32)
BASOPHILS # BLD MANUAL: 0.07 10*3/MM3 (ref 0–0.2)
BASOPHILS NFR BLD AUTO: 1 % (ref 0–1.5)
BILIRUB SERPL-MCNC: 0.4 MG/DL (ref 0–1.2)
BUN SERPL-MCNC: 7 MG/DL (ref 8–23)
BUN/CREAT SERPL: 10.8 (ref 7–25)
CALCIUM SPEC-SCNC: 7.9 MG/DL (ref 8.6–10.5)
CHLORIDE SERPL-SCNC: 91 MMOL/L (ref 98–107)
CO2 SERPL-SCNC: 27 MMOL/L (ref 22–29)
CREAT SERPL-MCNC: 0.65 MG/DL (ref 0.57–1)
DEPRECATED RDW RBC AUTO: 41.1 FL (ref 37–54)
EOSINOPHIL # BLD MANUAL: 0.77 10*3/MM3 (ref 0–0.4)
EOSINOPHIL NFR BLD MANUAL: 11 % (ref 0.3–6.2)
ERYTHROCYTE [DISTWIDTH] IN BLOOD BY AUTOMATED COUNT: 13.7 % (ref 12.3–15.4)
GFR SERPL CREATININE-BSD FRML MDRD: 89 ML/MIN/1.73
GLOBULIN UR ELPH-MCNC: 1.8 GM/DL
GLUCOSE SERPL-MCNC: 114 MG/DL (ref 65–99)
HCT VFR BLD AUTO: 26.6 % (ref 34–46.6)
HGB BLD-MCNC: 9.2 G/DL (ref 12–15.9)
LARGE PLATELETS: ABNORMAL
LYMPHOCYTES # BLD MANUAL: 1.54 10*3/MM3 (ref 0.7–3.1)
LYMPHOCYTES NFR BLD MANUAL: 22 % (ref 19.6–45.3)
LYMPHOCYTES NFR BLD MANUAL: 3 % (ref 5–12)
MAGNESIUM SERPL-MCNC: 1.4 MG/DL (ref 1.6–2.4)
MCH RBC QN AUTO: 30 PG (ref 26.6–33)
MCHC RBC AUTO-ENTMCNC: 34.7 G/DL (ref 31.5–35.7)
MCV RBC AUTO: 86.4 FL (ref 79–97)
MONOCYTES # BLD AUTO: 0.21 10*3/MM3 (ref 0.1–0.9)
NEUTROPHILS # BLD AUTO: 4.41 10*3/MM3 (ref 1.7–7)
NEUTROPHILS NFR BLD MANUAL: 61 % (ref 42.7–76)
NEUTS BAND NFR BLD MANUAL: 2 % (ref 0–5)
NT-PROBNP SERPL-MCNC: 586.7 PG/ML (ref 0–1800)
PHOSPHATE SERPL-MCNC: 3.2 MG/DL (ref 2.5–4.5)
PLATELET # BLD AUTO: 230 10*3/MM3 (ref 140–450)
PMV BLD AUTO: 8.6 FL (ref 6–12)
POTASSIUM SERPL-SCNC: 3.5 MMOL/L (ref 3.5–5.2)
PROT SERPL-MCNC: 5.5 G/DL (ref 6–8.5)
RBC # BLD AUTO: 3.07 10*6/MM3 (ref 3.77–5.28)
RBC MORPH BLD: NORMAL
SCAN SLIDE: NORMAL
SMALL PLATELETS BLD QL SMEAR: ADEQUATE
SODIUM SERPL-SCNC: 127 MMOL/L (ref 136–145)
WBC # BLD AUTO: 7 10*3/MM3 (ref 3.4–10.8)
WBC MORPH BLD: NORMAL

## 2021-07-03 PROCEDURE — 99232 SBSQ HOSP IP/OBS MODERATE 35: CPT | Performed by: INTERNAL MEDICINE

## 2021-07-03 PROCEDURE — 85025 COMPLETE CBC W/AUTO DIFF WBC: CPT | Performed by: NURSE PRACTITIONER

## 2021-07-03 PROCEDURE — 25010000002 MAGNESIUM SULFATE 2 GM/50ML SOLUTION: Performed by: NURSE PRACTITIONER

## 2021-07-03 PROCEDURE — 87070 CULTURE OTHR SPECIMN AEROBIC: CPT | Performed by: NURSE PRACTITIONER

## 2021-07-03 PROCEDURE — 83880 ASSAY OF NATRIURETIC PEPTIDE: CPT | Performed by: INTERNAL MEDICINE

## 2021-07-03 PROCEDURE — 87205 SMEAR GRAM STAIN: CPT | Performed by: NURSE PRACTITIONER

## 2021-07-03 PROCEDURE — 85007 BL SMEAR W/DIFF WBC COUNT: CPT | Performed by: NURSE PRACTITIONER

## 2021-07-03 PROCEDURE — 25010000002 ONDANSETRON PER 1 MG: Performed by: INTERNAL MEDICINE

## 2021-07-03 PROCEDURE — 83735 ASSAY OF MAGNESIUM: CPT | Performed by: NURSE PRACTITIONER

## 2021-07-03 PROCEDURE — 94799 UNLISTED PULMONARY SVC/PX: CPT

## 2021-07-03 PROCEDURE — 80053 COMPREHEN METABOLIC PANEL: CPT | Performed by: NURSE PRACTITIONER

## 2021-07-03 PROCEDURE — 63710000001 ONDANSETRON PER 8 MG: Performed by: INTERNAL MEDICINE

## 2021-07-03 PROCEDURE — 25010000002 ENOXAPARIN PER 10 MG: Performed by: NURSE PRACTITIONER

## 2021-07-03 PROCEDURE — 84100 ASSAY OF PHOSPHORUS: CPT | Performed by: NURSE PRACTITIONER

## 2021-07-03 RX ADMIN — ONDANSETRON 8 MG: 2 INJECTION INTRAMUSCULAR; INTRAVENOUS at 08:12

## 2021-07-03 RX ADMIN — HYDRALAZINE HYDROCHLORIDE 100 MG: 25 TABLET, FILM COATED ORAL at 09:18

## 2021-07-03 RX ADMIN — MONTELUKAST 10 MG: 10 TABLET, FILM COATED ORAL at 00:09

## 2021-07-03 RX ADMIN — DIGOXIN 250 MCG: 250 TABLET ORAL at 00:08

## 2021-07-03 RX ADMIN — HYDROCODONE BITARTRATE AND ACETAMINOPHEN 1 TABLET: 5; 325 TABLET ORAL at 16:58

## 2021-07-03 RX ADMIN — DOXYCYCLINE 100 MG: 100 TABLET, FILM COATED ORAL at 00:08

## 2021-07-03 RX ADMIN — GABAPENTIN 300 MG: 300 CAPSULE ORAL at 16:58

## 2021-07-03 RX ADMIN — METOPROLOL SUCCINATE 100 MG: 50 TABLET, EXTENDED RELEASE ORAL at 08:03

## 2021-07-03 RX ADMIN — ESCITALOPRAM OXALATE 10 MG: 10 TABLET ORAL at 08:02

## 2021-07-03 RX ADMIN — GUAIFENESIN 600 MG: 600 TABLET, EXTENDED RELEASE ORAL at 00:09

## 2021-07-03 RX ADMIN — GABAPENTIN 300 MG: 300 CAPSULE ORAL at 21:20

## 2021-07-03 RX ADMIN — MONTELUKAST 10 MG: 10 TABLET, FILM COATED ORAL at 21:20

## 2021-07-03 RX ADMIN — GUAIFENESIN 600 MG: 600 TABLET, EXTENDED RELEASE ORAL at 21:20

## 2021-07-03 RX ADMIN — LEVOTHYROXINE SODIUM 25 MCG: 0.03 TABLET ORAL at 05:52

## 2021-07-03 RX ADMIN — LOSARTAN POTASSIUM 100 MG: 50 TABLET, FILM COATED ORAL at 09:18

## 2021-07-03 RX ADMIN — NIFEDIPINE 60 MG: 60 TABLET, FILM COATED, EXTENDED RELEASE ORAL at 09:19

## 2021-07-03 RX ADMIN — ENOXAPARIN SODIUM 40 MG: 40 INJECTION SUBCUTANEOUS at 16:58

## 2021-07-03 RX ADMIN — IPRATROPIUM BROMIDE 0.5 MG: 0.5 SOLUTION RESPIRATORY (INHALATION) at 10:34

## 2021-07-03 RX ADMIN — HYDROCODONE BITARTRATE AND ACETAMINOPHEN 1 TABLET: 5; 325 TABLET ORAL at 22:58

## 2021-07-03 RX ADMIN — GUAIFENESIN 600 MG: 600 TABLET, EXTENDED RELEASE ORAL at 09:19

## 2021-07-03 RX ADMIN — MAGNESIUM SULFATE HEPTAHYDRATE 2 G: 40 INJECTION, SOLUTION INTRAVENOUS at 09:19

## 2021-07-03 RX ADMIN — GABAPENTIN 300 MG: 300 CAPSULE ORAL at 09:19

## 2021-07-03 RX ADMIN — GABAPENTIN 300 MG: 300 CAPSULE ORAL at 00:09

## 2021-07-03 RX ADMIN — ASPIRIN 325 MG ORAL TABLET 325 MG: 325 PILL ORAL at 09:19

## 2021-07-03 RX ADMIN — ONDANSETRON HYDROCHLORIDE 8 MG: 4 TABLET, FILM COATED ORAL at 22:58

## 2021-07-03 RX ADMIN — PANTOPRAZOLE SODIUM 40 MG: 40 TABLET, DELAYED RELEASE ORAL at 08:02

## 2021-07-03 RX ADMIN — IPRATROPIUM BROMIDE 0.5 MG: 0.5 SOLUTION RESPIRATORY (INHALATION) at 15:05

## 2021-07-03 RX ADMIN — BUDESONIDE AND FORMOTEROL FUMARATE DIHYDRATE 2 PUFF: 160; 4.5 AEROSOL RESPIRATORY (INHALATION) at 19:36

## 2021-07-03 RX ADMIN — HYDRALAZINE HYDROCHLORIDE 100 MG: 25 TABLET, FILM COATED ORAL at 21:20

## 2021-07-03 RX ADMIN — POTASSIUM CHLORIDE 40 MEQ: 1500 TABLET, EXTENDED RELEASE ORAL at 16:58

## 2021-07-03 RX ADMIN — POTASSIUM CHLORIDE 40 MEQ: 1500 TABLET, EXTENDED RELEASE ORAL at 09:21

## 2021-07-03 RX ADMIN — HYDROCODONE BITARTRATE AND ACETAMINOPHEN 1 TABLET: 5; 325 TABLET ORAL at 08:02

## 2021-07-03 RX ADMIN — DOXYCYCLINE 100 MG: 100 TABLET, FILM COATED ORAL at 21:20

## 2021-07-03 RX ADMIN — ONDANSETRON HYDROCHLORIDE 8 MG: 4 TABLET, FILM COATED ORAL at 16:58

## 2021-07-03 RX ADMIN — DIGOXIN 250 MCG: 250 TABLET ORAL at 21:20

## 2021-07-03 RX ADMIN — HYDRALAZINE HYDROCHLORIDE 25 MG: 25 TABLET, FILM COATED ORAL at 00:10

## 2021-07-03 RX ADMIN — IPRATROPIUM BROMIDE 0.5 MG: 0.5 SOLUTION RESPIRATORY (INHALATION) at 19:36

## 2021-07-03 RX ADMIN — DOXYCYCLINE 100 MG: 100 TABLET, FILM COATED ORAL at 09:19

## 2021-07-03 NOTE — PROGRESS NOTES
Name: Gali Dover ADMIT: 2021   : 1945  PCP: Chris Pedro MD    MRN: [id number] LOS: 3 days   AGE/SEX: 75 y.o. female  ROOM: [address] [place] [address]    [place], [facility] [employer]    No chief complaint on file.    CC: neck is sore  Subjective     75 y.o. female s/p left common carotid artery to subclavian artery bypass with PTFE.  Patient complains of neck soreness, appropriate.  No new focal neurologic symptoms.  Feels better    Review of Systems  Denies focal neurologic symptoms  Denies cardiopulmonary complaints  Objective     Scheduled Medications:   aspirin, 325 mg, Oral, Daily  budesonide-formoterol, 2 puff, Inhalation, BID - RT  digoxin, 250 mcg, Oral, Nightly  doxycycline, 100 mg, Oral, Q12H  enoxaparin, 40 mg, Subcutaneous, Daily  escitalopram, 10 mg, Oral, QAM  gabapentin, 300 mg, Oral, TID  guaiFENesin, 600 mg, Oral, Q12H  hydrALAZINE, 100 mg, Oral, TID  ipratropium, 0.5 mg, Nebulization, 4x Daily - RT  levothyroxine, 25 mcg, Oral, Q AM  losartan, 100 mg, Oral, QAM  metoprolol succinate XL, 100 mg, Oral, QAM  montelukast, 10 mg, Oral, Nightly  NIFEdipine XL, 60 mg, Oral, Q24H  pantoprazole, 40 mg, Oral, QAM        Active Infusions:       As Needed Medications:  •  acetaminophen  •  albuterol sulfate HFA  •  HYDROcodone-acetaminophen  •  labetalol  •  magnesium sulfate **OR** magnesium sulfate in D5W 1g/100mL (PREMIX)  •  Morphine **AND** naloxone  •  nitroglycerin  •  ondansetron  •  ondansetron  •  potassium chloride **OR** potassium chloride **OR** potassium chloride  •  potassium phosphate infusion greater than 15 mMoles **OR** potassium phosphate infusion greater than 15 mMoles **OR** potassium phosphate **OR** sodium phosphate IVPB **OR** sodium phosphate IVPB **OR** sodium phosphate IVPB    Vital Signs  Vital Signs   Patient Vitals for the past 24 hrs:   BP Temp Temp src Pulse Resp SpO2 Weight   21 1512 -- -- -- 65 18 99 % --   21 1505 -- -- -- 70 18 96 % --   21 1142  154/47 97.9 °F (36.6 °C) Oral 56 14 97 % --   07/03/21 1041 -- -- -- 66 16 98 % --   07/03/21 1034 -- -- -- 62 16 97 % --   07/03/21 0918 159/63 -- -- 67 -- -- --   07/03/21 0805 163/41 -- -- -- -- -- --   07/03/21 0650 -- -- -- 69 -- -- --   07/03/21 0500 -- -- -- -- -- -- 73.2 kg (161 lb 6 oz)   07/03/21 0351 164/55 97.6 °F (36.4 °C) Oral 71 15 96 % --   07/03/21 0043 153/54 98.4 °F (36.9 °C) Oral 69 16 97 % --   07/03/21 0011 149/54 -- -- -- -- -- --   07/02/21 2110 -- -- -- 64 16 -- --   07/02/21 2105 -- -- -- 65 16 96 % --   07/02/21 2029 164/61 97.3 °F (36.3 °C) Oral 72 16 97 % --   07/02/21 1631 163/64 97.6 °F (36.4 °C) Oral 64 19 96 % --   07/02/21 1625 -- -- -- 70 18 -- --   07/02/21 1620 -- -- -- 71 18 96 % --     I/O:     Intake/Output Summary (Last 24 hours) at 7/3/2021 1553  Last data filed at 7/3/2021 0720  Gross per 24 hour   Intake --   Output 500 ml   Net -500 ml     BMI:  Body mass index is 26.85 kg/m².    Physical Exam:  Physical Exam   Left sided incision site swollen, expected tenderness.  No new focal neurologic deficits.  Palpable radial pulse.  Results Review:     CBC    Results from last 7 days   Lab Units 07/03/21  0329 07/02/21  0403 07/01/21  0550 06/30/21  1542   WBC 10*3/mm3 7.00 7.40 8.90 10.10   HEMOGLOBIN g/dL 9.2* 9.8* 9.1* 10.1*   PLATELETS 10*3/mm3 230 234 243 262     BMP   Results from last 7 days   Lab Units 07/03/21  0329 07/02/21  0403 07/01/21  0550 06/30/21  1542   SODIUM mmol/L 127* 124* 130* 134*   POTASSIUM mmol/L 3.5 3.9 4.1 4.4   CHLORIDE mmol/L 91* 91* 96* 99   CO2 mmol/L 27.0 23.0 23.0 23.0   BUN mg/dL 7* 8 8 9   CREATININE mg/dL 0.65 0.60 0.69 0.73   GLUCOSE mg/dL 114* 102* 102* 168*   MAGNESIUM mg/dL 1.4* 1.6 1.7 1.4*   PHOSPHORUS mg/dL 3.2 3.4 3.7 4.2     Coag     HbA1C No results found for: HGBA1C  Infection     Radiology(recent) XR Chest 1 View    Result Date: 7/2/2021   1. Increased congestive changes have developed throughout the lungs and central hilar areas.  No large effusion is noted.   Electronically Signed By-Steven Mayers MD On:7/2/2021 12:49 PM This report was finalized on [id number] by  Steven Mayers MD.      Assessment/Plan     Assessment & Plan      Subclavian steal syndrome of left subclavian artery    Bilateral carotid artery stenosis    Anxiety    Asthma    Paroxysmal atrial fibrillation (CMS/HCC)    Depression    GERD (gastroesophageal reflux disease)    Hyperlipidemia    Essential hypertension    Polymyalgia     Hypothyroid    Peripheral neuropathy    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 female POD #3  left common carotid artery to subclavian artery bypass with PTFE.  Tolerated procedure well.  Appropriate postsurgical soreness.  No new focal neurologic symptoms.  Starting to improve with more stable blood pressure.  Patient appears to be fairly deconditioned and has expressed the desire for inpatient rehab.  I think this would be wise.  We will consult rehab.    Personal protective equipment used for this patient encounter:  Patient wearing surgical mask []    Provider wearing a surgical mask [x]    Gloves [x]    Eye protection [x]    Face Shield []    Gown []    N 95 respirator or CAPR/PAPR []   Duration of interaction 10 minutes    Glenn Frost MD  07/03/21  15:53 EDT    Please call my office with any question: [phone]    Active Hospital Problems    Diagnosis  POA   • **Subclavian steal syndrome of left subclavian artery [G45.8]  Yes   • Transaminitis [R74.01]  No   • Overweight (BMI 25.0-29.9) [E66.3]  Yes   • Normocytic anemia due to blood loss [D50.0]  No   • Bilateral carotid artery stenosis [I65.23]  Yes   • Asthma [J45.909]  Yes   • Depression [F32.9]  Yes   • Hyperlipidemia [E78.5]  Yes   • Polymyalgia  [M35.3]  Yes   • Hypothyroid [E03.9]  Yes   • Peripheral neuropathy [G62.9]  Yes   •  Paroxysmal atrial fibrillation (CMS/HCC) [I48.0]  Yes   • 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

## 2021-07-03 NOTE — PROGRESS NOTES
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)    Transaminitis    Overweight (BMI 25.0-29.9)    Normocytic anemia due to blood loss    Assessment    Dyspnea with hypoxia  Patient dropped to 87% on room air; continue with 3 liters    Subclavian steal syndrome of left subclavian artery  Bilateral carotid artery stenosis  -status post left common carotid artery to subclavian artery bypass with 6 mm PTFE ring supported graft (07/01/21)    Hyponatremia sodium improved today 127    Normocytic anemia due to blood loss  -hgb 9.8 today    Asthma / COPD (chronic obstructive pulmonary disease) with chronic bronchitis   -stable without exacerbation    Transaminitis  -,  today compared to 20 and 27 on 02/22/21  -discontinue statin      Plan    Patient feels better today  Chest x-ray suggestive of pulmonary edema  Lasix 40 given    Increased left lower lobe density with low-grade fever cannot rule out early pneumonia  Empiric antibiotic doxy   Diet advanced to healthy heart  Monitor labs and electrolytes  Duo-nebs/symbicort  DVT prophylaxis lovenox           LOS: 3 days       Subjective         Objective     Vital signs for last 24 hours:  Vitals:    07/03/21 0500 07/03/21 0650 07/03/21 0805 07/03/21 0918   BP:   163/41 159/63   BP Location:   Left arm    Patient Position:   Lying    Pulse:  69  67   Resp:       Temp:       TempSrc:       SpO2:       Weight: 73.2 kg (161 lb 6 oz)      Height:           Intake/Output last 3 shifts:  I/O last 3 completed shifts:  In: 840 [P.O.:840]  Out: -   Intake/Output this shift:  I/O this shift:  In: -   Out: 500  [Urine:500]      Radiology  Imaging Results (Last 24 Hours)     ** No results found for the last 24 hours. **          Labs:  Results from last 7 days   Lab Units 07/03/21  0329   WBC 10*3/mm3 7.00   HEMOGLOBIN g/dL 9.2*   HEMATOCRIT % 26.6*   PLATELETS 10*3/mm3 230     Results from last 7 days   Lab Units 07/03/21  0329   SODIUM mmol/L 127*   POTASSIUM mmol/L 3.5   CHLORIDE mmol/L 91*   CO2 mmol/L 27.0   BUN mg/dL 7*   CREATININE mg/dL 0.65   CALCIUM mg/dL 7.9*   BILIRUBIN mg/dL 0.4   ALK PHOS U/L 31*   ALT (SGPT) U/L 87*   AST (SGOT) U/L 63*   GLUCOSE mg/dL 114*         Results from last 7 days   Lab Units 07/03/21  0329 07/02/21  0403 07/01/21  0550   ALBUMIN g/dL 3.70 3.80 3.80             Results from last 7 days   Lab Units 07/03/21  0329   MAGNESIUM mg/dL 1.4*                   Meds:   SCHEDULE  aspirin, 325 mg, Oral, Daily  budesonide-formoterol, 2 puff, Inhalation, BID - RT  digoxin, 250 mcg, Oral, Nightly  doxycycline, 100 mg, Oral, Q12H  enoxaparin, 40 mg, Subcutaneous, Daily  escitalopram, 10 mg, Oral, QAM  gabapentin, 300 mg, Oral, TID  guaiFENesin, 600 mg, Oral, Q12H  hydrALAZINE, 100 mg, Oral, TID  ipratropium, 0.5 mg, Nebulization, 4x Daily - RT  levothyroxine, 25 mcg, Oral, Q AM  losartan, 100 mg, Oral, QAM  metoprolol succinate XL, 100 mg, Oral, QAM  montelukast, 10 mg, Oral, Nightly  NIFEdipine XL, 60 mg, Oral, Q24H  pantoprazole, 40 mg, Oral, QAM      Infusions     PRNs  •  acetaminophen  •  albuterol sulfate HFA  •  HYDROcodone-acetaminophen  •  labetalol  •  magnesium sulfate **OR** magnesium sulfate in D5W 1g/100mL (PREMIX)  •  Morphine **AND** naloxone  •  nitroglycerin  •  ondansetron  •  ondansetron  •  potassium chloride **OR** potassium chloride **OR** potassium chloride  •  potassium phosphate infusion greater than 15 mMoles **OR** potassium phosphate infusion greater than 15 mMoles **OR** potassium phosphate **OR** sodium phosphate IVPB **OR** sodium phosphate IVPB **OR** sodium  phosphate IVPB    Physical Exam:  Physical Exam  Pulmonary:      Breath sounds: Examination of the right-middle field reveals decreased breath sounds. Examination of the left-middle field reveals decreased breath sounds. Examination of the right-lower field reveals decreased breath sounds. Examination of the left-lower field reveals decreased breath sounds. Decreased breath sounds present.   Skin:     General: Skin is warm and dry.      Coloration: Skin is pale.   Neurological:      Mental Status: She is alert and oriented to person, place, and time.         ROS  Review of Systems   Respiratory: Positive for cough.        I reviewed the patient's new clinical results.      Electronically signed by RICKY Thomas, 07/03/21 at 09:39 EDT.

## 2021-07-03 NOTE — PROGRESS NOTES
"      [facility] Daily Progress Note      Hospitalist Team  LOS 3 days      Patient Care Team:  Chris Pedro MD as PCP - General (Internal Medicine)  Navid Hassan MD as Surgeon (Vascular Surgery)  Rubén Avitia MD as Consulting Physician (Cardiology)  Dante Langston MD as Consulting Physician (Rheumatology)  Laurita Swanson APRN as Consulting Physician (Nurse Practitioner)  Carmelita Cruz MD as Consulting Physician (Pulmonary Disease)    Patient Location: 4110/1      Subjective   Subjective     Chief Complaint / Subjective  No chief complaint on file.        Brief Synopsis of Hospital Course/HPI  Patient is a 75-year-old female with subclavian steal syndrome.  The patient is postop day 3 from left-sided repair.  The patient overall is doing well.      Date::          Review of Systems   Constitutional: Negative.   HENT: Negative.    Eyes: Negative.    Cardiovascular: Negative.    Respiratory: Negative.    Endocrine: Negative.    Hematologic/Lymphatic: Negative.    Skin: Negative.    Musculoskeletal: Negative.         Some incisional pain as would be expected.   Gastrointestinal: Negative.    Genitourinary: Negative.    Neurological: Negative.    Psychiatric/Behavioral: Negative.    Allergic/Immunologic: Negative.    All other systems reviewed and are negative.    Objective   Objective      Vital Signs  Temp:  [97.3 °F (36.3 °C)-98.4 °F (36.9 °C)] 97.9 °F (36.6 °C)  Heart Rate:  [56-72] 56  Resp:  [14-18] 18  BP: (137-164)/(41-63) 137/63  Oxygen Therapy  SpO2: 99 %  Pulse Oximetry Type: Intermittent  Device (Oxygen Therapy): nasal cannula  Flow (L/min): 2  Oxygen Concentration (%): 28  Flowsheet Rows      First Filed Value   Admission Height  165.1 cm (65\") Documented at 06/30/2021 0551   Admission Weight  73.3 kg (161 lb 9.6 oz) Documented at 06/30/2021 0551        Intake & Output (last 3 days)       06/30 0701 - 07/01 0700 07/01 0701 - 07/02 0700 07/02 " 0701 - 07/03 0700 07/03 0701 - 07/04 0700    P.O.  1020 360     I.V. (mL/kg) 500 (6.8)       Total Intake(mL/kg) 500 (6.8) 1020 (14) 360 (4.9)     Urine (mL/kg/hr) 300 (0.2)   500 (0.6)    Blood 25       Total Output 325   500    Net +175 +1020 +360 -500            Urine Unmeasured Occurrence  2 x 2 x         Lines, Drains & Airways    Active LDAs     Name:   Placement date:   Placement time:   Site:   Days:    Peripheral IV 06/30/21 0625 Posterior;Right Wrist   06/30/21 0625    Wrist   3            Physical Exam:  Physical Exam  Vitals and nursing note reviewed.   Constitutional:       General: She is not in acute distress.     Appearance: Normal appearance. She is well-developed. She is not ill-appearing, toxic-appearing or diaphoretic.   HENT:      Head: Normocephalic and atraumatic.      Right Ear: Ear canal and external ear normal.      Left Ear: Ear canal and external ear normal.      Nose: Nose normal. No congestion or rhinorrhea.      Mouth/Throat:      Mouth: Mucous membranes are moist.      Pharynx: No oropharyngeal exudate.   Eyes:      General: No scleral icterus.        Right eye: No discharge.         Left eye: No discharge.      Extraocular Movements: Extraocular movements intact.      Conjunctiva/sclera: Conjunctivae normal.      Pupils: Pupils are equal, round, and reactive to light.   Neck:      Thyroid: No thyromegaly.      Vascular: No carotid bruit or JVD.      Trachea: No tracheal deviation.   Cardiovascular:      Rate and Rhythm: Normal rate and regular rhythm.      Pulses: Normal pulses.      Heart sounds: Normal heart sounds. No murmur heard.   No friction rub. No gallop.    Pulmonary:      Effort: Pulmonary effort is normal. No respiratory distress.      Breath sounds: Normal breath sounds. No stridor. No wheezing, rhonchi or rales.   Chest:      Chest wall: No tenderness.   Abdominal:      General: Bowel sounds are normal. There is no distension.      Palpations: Abdomen is soft. There  is no mass.      Tenderness: There is no abdominal tenderness. There is no guarding or rebound.      Hernia: No hernia is present.   Musculoskeletal:         General: No swelling, tenderness, deformity or signs of injury. Normal range of motion.      Cervical back: Normal range of motion and neck supple. No rigidity. No muscular tenderness.      Right lower leg: No edema.      Left lower leg: No edema.   Lymphadenopathy:      Cervical: No cervical adenopathy.   Skin:     General: Skin is warm and dry.      Coloration: Skin is not jaundiced or pale.      Findings: No bruising, erythema or rash.   Neurological:      General: No focal deficit present.      Mental Status: She is alert and oriented to person, place, and time. Mental status is at baseline.      Cranial Nerves: No cranial nerve deficit.      Sensory: No sensory deficit.      Motor: No weakness or abnormal muscle tone.      Coordination: Coordination normal.   Psychiatric:         Mood and Affect: Mood normal.         Behavior: Behavior normal.         Thought Content: Thought content normal.         Judgment: Judgment normal.              Wounds (last 24 hours)      LDA Wound     Row Name 07/03/21 1500 07/03/21 1100 07/03/21 0715       Wound 06/30/21 0941 Left neck Incision    Wound - Properties Group Placement Date: 06/30/21  -LT Placement Time: 0941  -LT Side: Left  -LT Location: neck  -LT Primary Wound Type: Incision  -LT    Dressing Appearance  open to air  -XC  open to air  -XC  open to air  -XC    Closure  Liquid skin adhesive  -XC  Liquid skin adhesive  -XC  Liquid skin adhesive  -XC    Base  pink  -XC  pink  -XC  pink  -XC    Retired Wound - Properties Group Date first assessed: 06/30/21  -LT Time first assessed: 0941  -LT Side: Left  -LT Location: neck  -LT Primary Wound Type: Incision  -LT    Row Name 07/02/21 2303 07/02/21 1925          Wound 06/30/21 0941 Left neck Incision    Wound - Properties Group Placement Date: 06/30/21  -LT Placement  Time: 0941 -LT Side: Left  -LT Location: neck  -LT Primary Wound Type: Incision  -LT    Dressing Appearance  open to air  -LS  open to air  -LS     Closure  Liquid skin adhesive  -LS  Liquid skin adhesive  -LS     Base  pink  -LS  pink  -LS     Periwound  intact  -LS  intact  -LS     Periwound Temperature  warm  -LS  warm  -LS     Edges  -- approximated  -LS  -- approximated  -LS     Retired Wound - Properties Group Date first assessed: 06/30/21  -LT Time first assessed: 0941 -LT Side: Left  -LT Location: neck  -LT Primary Wound Type: Incision  -LT      User Key  (r) = Recorded By, (t) = Taken By, (c) = Cosigned By    Initials Name Provider Type    LT Michelle Montana, RN Registered Nurse    Gautam Whelan RN Registered Nurse    Yesica Fraire RN Registered Nurse      Procedures:  Procedure(s):  CAROTID SUBCLAVIAN BYPASS    Results Review:     I reviewed the patient's new clinical results.    Lab Results (last 24 hours)     Procedure Component Value Units Date/Time    Respiratory Culture - Sputum, Cough [[id number]] Collected: 07/03/21 0016    Specimen: Sputum from Cough Updated: 07/03/21 0750     Gram Stain Many (4+) WBCs per low power field      Rare (1+) Epithelial cells per low power field      Moderate (3+) Mixed bacterial morphotypes seen on Gram Stain    Manual Differential [[id number]]  (Abnormal) Collected: 07/03/21 0329    Specimen: Blood Updated: 07/03/21 0504     Neutrophil % 61.0 %      Lymphocyte % 22.0 %      Monocyte % 3.0 %      Eosinophil % 11.0 %      Basophil % 1.0 %      Bands %  2.0 %      Neutrophils Absolute 4.41 10*3/mm3      Lymphocytes Absolute 1.54 10*3/mm3      Monocytes Absolute 0.21 10*3/mm3      Eosinophils Absolute 0.77 10*3/mm3      Basophils Absolute 0.07 10*3/mm3      RBC Morphology Normal     WBC Morphology Normal     Platelet Estimate Adequate     Large Platelets Slight/1+    CBC & Differential [[id number]]  (Abnormal) Collected: 07/03/21 0329    Specimen: Blood Updated:  07/03/21 0504    Narrative:      The following orders were created for panel order CBC & Differential.  Procedure                               Abnormality         Status                     ---------                               -----------         ------                     Scan Slide[[id number]]                                       Final result               CBC Auto Differential[[id number]]        Abnormal            Final result                 Please view results for these tests on the individual orders.    Scan Slide [[id number]] Collected: 07/03/21 0329    Specimen: Blood Updated: 07/03/21 0504     Scan Slide --     Comment: See Manual Differential Results       CBC Auto Differential [[id number]]  (Abnormal) Collected: 07/03/21 0329    Specimen: Blood Updated: 07/03/21 0504     WBC 7.00 10*3/mm3      RBC 3.07 10*6/mm3      Hemoglobin 9.2 g/dL      Hematocrit 26.6 %      MCV 86.4 fL      MCH 30.0 pg      MCHC 34.7 g/dL      RDW 13.7 %      RDW-SD 41.1 fl      MPV 8.6 fL      Platelets 230 10*3/mm3     Narrative:      The previously reported component NRBC is no longer being reported. Previous result was 0.1 /100 WBC (Reference Range: 0.0-0.2 /100 WBC) on 7/3/2021 at [facility] EDT.    Comprehensive Metabolic Panel [[id number]]  (Abnormal) Collected: 07/03/21 0329    Specimen: Blood Updated: 07/03/21 0438     Glucose 114 mg/dL      BUN 7 mg/dL      Creatinine 0.65 mg/dL      Sodium 127 mmol/L      Potassium 3.5 mmol/L      Chloride 91 mmol/L      CO2 27.0 mmol/L      Calcium 7.9 mg/dL      Total Protein 5.5 g/dL      Albumin 3.70 g/dL      ALT (SGPT) 87 U/L      AST (SGOT) 63 U/L      Alkaline Phosphatase 31 U/L      Total Bilirubin 0.4 mg/dL      eGFR Non African Amer 89 mL/min/1.73      Globulin 1.8 gm/dL      A/G Ratio 2.1 g/dL      BUN/Creatinine Ratio 10.8     Anion Gap 9.0 mmol/L     Narrative:      GFR Normal >60  Chronic Kidney Disease <60  Kidney Failure <15      Phosphorus [[id number]]  (Normal) Collected:  07/03/21 0329    Specimen: Blood Updated: 07/03/21 0438     Phosphorus 3.2 mg/dL     Magnesium [[id number]]  (Abnormal) Collected: 07/03/21 0329    Specimen: Blood Updated: 07/03/21 0438     Magnesium 1.4 mg/dL     BNP [[id number]]  (Normal) Collected: 07/03/21 0329    Specimen: Blood Updated: 07/03/21 0429     proBNP 586.7 pg/mL     Narrative:      Among patients with dyspnea, NT-proBNP is highly sensitive for the detection of acute congestive heart failure. In addition NT-proBNP of <300 pg/ml effectively rules out acute congestive heart failure with 99% negative predictive value.    Results may be falsely decreased if patient taking Biotin.          No results found for: HGBA1C            No results found for: LIPASE  Lab Results   Component Value Date    CHOL 150 07/01/2021    CHLPL 191 08/03/2020    TRIG 131 07/01/2021    HDL 34 (L) 07/01/2021    LDL 92 07/01/2021       Lab Results   Lab Value Date/Time    FINALDX  11/11/2020 0000     1. Oral Cavity Upper Roof of Mouth, Biopsy: Benign squamous epithelium with   A. Reactive changes (Focal parakeratosis)   B. No dysplasia.     Juan Diego/kds         Microbiology Results (last 10 days)     Procedure Component Value - Date/Time    Respiratory Culture - Sputum, Cough [[id number]] Collected: 07/03/21 0016    Lab Status: Preliminary result Specimen: Sputum from Cough Updated: 07/03/21 0750     Gram Stain Many (4+) WBCs per low power field      Rare (1+) Epithelial cells per low power field      Moderate (3+) Mixed bacterial morphotypes seen on Gram Stain    COVID PRE-OP / PRE-PROCEDURE SCREENING ORDER (NO ISOLATION) - Swab, Nasopharynx [[id number]]  (Normal) Collected: 06/28/21 1151    Lab Status: Final result Specimen: Swab from Nasopharynx Updated: 06/28/21 2005    Narrative:      The following orders were created for panel order COVID PRE-OP / PRE-PROCEDURE SCREENING ORDER (NO ISOLATION) - Swab, Nasopharynx.  Procedure                               Abnormality         Status                      ---------                               -----------         ------                     COVID-19,APTIMA PANTHER,...[[id number]]  Normal              Final result                 Please view results for these tests on the individual orders.    COVID-19,APTIMA PANTHER,JENNIFER IN-HOUSE, NP/OP SWAB IN UTM/VTM/SALINE TRANSPORT MEDIA,24 HR TAT - Swab, Nasopharynx [[id number]]  (Normal) Collected: 06/28/21 1151    Lab Status: Final result Specimen: Swab from Nasopharynx Updated: 06/28/21 2005     COVID19 Not Detected    Narrative:      Fact sheet for providers: https://www.fda.gov/media/304107/download     Fact sheet for patients: https://www.fda.gov/media/872243/download    Test performed by RT PCR.          ECG/EMG Results (most recent)     None          Results for orders placed during the hospital encounter of 06/01/21    Duplex carotid ultrasound CAR    Interpretation Summary  · Proximal right internal carotid artery moderate stenosis.  · Proximal left internal carotid artery moderate stenosis.  · Left Vertebral: Retrograde flow noted.          XR Chest 1 View    Result Date: 7/2/2021   1. Increased congestive changes have developed throughout the lungs and central hilar areas. No large effusion is noted.   Electronically Signed By-Steven Mayers MD On:7/2/2021 12:49 PM This report was finalized on [id number] by  Steven Mayers MD.          Xrays, labs reviewed personally by physician.    Medication Review:   I have reviewed the patient's current medication list      Scheduled Meds  aspirin, 325 mg, Oral, Daily  budesonide-formoterol, 2 puff, Inhalation, BID - RT  digoxin, 250 mcg, Oral, Nightly  doxycycline, 100 mg, Oral, Q12H  enoxaparin, 40 mg, Subcutaneous, Daily  escitalopram, 10 mg, Oral, QAM  gabapentin, 300 mg, Oral, TID  guaiFENesin, 600 mg, Oral, Q12H  hydrALAZINE, 100 mg, Oral, TID  ipratropium, 0.5 mg, Nebulization, 4x Daily - RT  levothyroxine, 25 mcg, Oral, Q AM  losartan, 100 mg,  Oral, QAM  metoprolol succinate XL, 100 mg, Oral, QAM  montelukast, 10 mg, Oral, Nightly  NIFEdipine XL, 60 mg, Oral, Q24H  pantoprazole, 40 mg, Oral, QAM        Meds Infusions       Meds PRN  •  acetaminophen  •  albuterol sulfate HFA  •  HYDROcodone-acetaminophen  •  labetalol  •  magnesium sulfate **OR** magnesium sulfate in D5W 1g/100mL (PREMIX)  •  Morphine **AND** naloxone  •  nitroglycerin  •  ondansetron  •  ondansetron  •  potassium chloride **OR** potassium chloride **OR** potassium chloride  •  potassium phosphate infusion greater than 15 mMoles **OR** potassium phosphate infusion greater than 15 mMoles **OR** potassium phosphate **OR** sodium phosphate IVPB **OR** sodium phosphate IVPB **OR** sodium phosphate IVPB        Assessment/Plan   Assessment/Plan     Active Hospital Problems    Diagnosis  POA   • **Subclavian steal syndrome of left subclavian artery [G45.8]  Yes   • Transaminitis [R74.01]  No   • Overweight (BMI 25.0-29.9) [E66.3]  Yes   • Normocytic anemia due to blood loss [D50.0]  No   • Bilateral carotid artery stenosis [I65.23]  Yes   • Asthma [J45.909]  Yes   • Depression [F32.9]  Yes   • Hyperlipidemia [E78.5]  Yes   • Polymyalgia  [M35.3]  Yes   • Hypothyroid [E03.9]  Yes   • Peripheral neuropathy [G62.9]  Yes   • Paroxysmal atrial fibrillation (CMS/HCC) [I48.0]  Yes   • 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       MEDICAL DECISION MAKING COMPLEXITY BY PROBLEM:   1.  Subclavian steal syndrome  -Postop day 3  -Overall doing fairly well  -Continue current medications    2.  Chronic obstructive pulmonary disease  -Continue breathing treatments and oxygen    3.  Hypertension  -Essential  -continue home medications for this issue    4.   Polymyalgia  -Well-controlled at present    5.  Bilateral carotid artery stenosis  -Management per vascular surgery    6.  Paroxysmal atrial fibrillation  -Consider anticoagulation once okay with with vascular surgery      VTE Prophylaxis -   Mechanical Order History:     None      Pharmalogical Order History:      Ordered     Dose Route Frequency Stop    06/30/21 1129  enoxaparin (LOVENOX) syringe 40 mg      40 mg SC Daily --    06/30/21 0750  sodium chloride 500 mL with heparin (porcine) 5000 UNIT/ML 5,000 Units mixture  Status:  Discontinued      -- -- As Needed 06/30/21 1152            Code Status -   Code Status and Medical Interventions:   Ordered at: 06/30/21 1130     Code Status:    CPR     Medical Interventions (Level of Support Prior to Arrest):    Full   This patient has been examined wearing appropriate Personal Protective Equipment and discussed with hospital infection control department. 07/03/21    Discharge Planning  Per vascular surgery    Electronically signed by Janina Scott MD, 07/03/21, 18:08 EDT.  [facility] Hospitalist Team

## 2021-07-03 NOTE — PLAN OF CARE
Goal Outcome Evaluation:  Plan of Care Reviewed With: patient        Progress: improving  Outcome Summary: Pt is stable and resting in bed. No change is hematoma size. HR dipped into high 40s today, attending notified. Will continue to monitor.

## 2021-07-04 LAB
ALBUMIN SERPL-MCNC: 3.6 G/DL (ref 3.5–5.2)
ALBUMIN/GLOB SERPL: 2.4 G/DL
ALP SERPL-CCNC: 27 U/L (ref 39–117)
ALT SERPL W P-5'-P-CCNC: 70 U/L (ref 1–33)
ANION GAP SERPL CALCULATED.3IONS-SCNC: 8 MMOL/L (ref 5–15)
AST SERPL-CCNC: 45 U/L (ref 1–32)
BASOPHILS # BLD AUTO: 0.1 10*3/MM3 (ref 0–0.2)
BASOPHILS NFR BLD AUTO: 1.3 % (ref 0–1.5)
BILIRUB SERPL-MCNC: 0.3 MG/DL (ref 0–1.2)
BUN SERPL-MCNC: 7 MG/DL (ref 8–23)
BUN/CREAT SERPL: 10.6 (ref 7–25)
CALCIUM SPEC-SCNC: 8.2 MG/DL (ref 8.6–10.5)
CHLORIDE SERPL-SCNC: 93 MMOL/L (ref 98–107)
CO2 SERPL-SCNC: 27 MMOL/L (ref 22–29)
CREAT SERPL-MCNC: 0.66 MG/DL (ref 0.57–1)
DEPRECATED RDW RBC AUTO: 41.6 FL (ref 37–54)
EOSINOPHIL # BLD AUTO: 0.6 10*3/MM3 (ref 0–0.4)
EOSINOPHIL NFR BLD AUTO: 9.1 % (ref 0.3–6.2)
ERYTHROCYTE [DISTWIDTH] IN BLOOD BY AUTOMATED COUNT: 13.7 % (ref 12.3–15.4)
GFR SERPL CREATININE-BSD FRML MDRD: 87 ML/MIN/1.73
GLOBULIN UR ELPH-MCNC: 1.5 GM/DL
GLUCOSE SERPL-MCNC: 98 MG/DL (ref 65–99)
HCT VFR BLD AUTO: 26.4 % (ref 34–46.6)
HGB BLD-MCNC: 9.2 G/DL (ref 12–15.9)
LYMPHOCYTES # BLD AUTO: 1.8 10*3/MM3 (ref 0.7–3.1)
LYMPHOCYTES NFR BLD AUTO: 27.5 % (ref 19.6–45.3)
MAGNESIUM SERPL-MCNC: 1.8 MG/DL (ref 1.6–2.4)
MCH RBC QN AUTO: 30.3 PG (ref 26.6–33)
MCHC RBC AUTO-ENTMCNC: 34.6 G/DL (ref 31.5–35.7)
MCV RBC AUTO: 87.6 FL (ref 79–97)
MONOCYTES # BLD AUTO: 0.9 10*3/MM3 (ref 0.1–0.9)
MONOCYTES NFR BLD AUTO: 14.1 % (ref 5–12)
NEUTROPHILS NFR BLD AUTO: 3.1 10*3/MM3 (ref 1.7–7)
NEUTROPHILS NFR BLD AUTO: 48 % (ref 42.7–76)
NRBC BLD AUTO-RTO: 0.2 /100 WBC (ref 0–0.2)
PHOSPHATE SERPL-MCNC: 3.1 MG/DL (ref 2.5–4.5)
PLATELET # BLD AUTO: 236 10*3/MM3 (ref 140–450)
PMV BLD AUTO: 8.6 FL (ref 6–12)
POTASSIUM SERPL-SCNC: 4.3 MMOL/L (ref 3.5–5.2)
PROT SERPL-MCNC: 5.1 G/DL (ref 6–8.5)
RBC # BLD AUTO: 3.02 10*6/MM3 (ref 3.77–5.28)
SODIUM SERPL-SCNC: 128 MMOL/L (ref 136–145)
WBC # BLD AUTO: 6.4 10*3/MM3 (ref 3.4–10.8)

## 2021-07-04 PROCEDURE — 80053 COMPREHEN METABOLIC PANEL: CPT | Performed by: NURSE PRACTITIONER

## 2021-07-04 PROCEDURE — 94640 AIRWAY INHALATION TREATMENT: CPT

## 2021-07-04 PROCEDURE — 94799 UNLISTED PULMONARY SVC/PX: CPT

## 2021-07-04 PROCEDURE — 94760 N-INVAS EAR/PLS OXIMETRY 1: CPT

## 2021-07-04 PROCEDURE — 85025 COMPLETE CBC W/AUTO DIFF WBC: CPT | Performed by: NURSE PRACTITIONER

## 2021-07-04 PROCEDURE — 97116 GAIT TRAINING THERAPY: CPT

## 2021-07-04 PROCEDURE — 84100 ASSAY OF PHOSPHORUS: CPT | Performed by: NURSE PRACTITIONER

## 2021-07-04 PROCEDURE — 25010000002 MAGNESIUM SULFATE IN D5W 1G/100ML (PREMIX) 1-5 GM/100ML-% SOLUTION: Performed by: NURSE PRACTITIONER

## 2021-07-04 PROCEDURE — 63710000001 ONDANSETRON PER 8 MG: Performed by: INTERNAL MEDICINE

## 2021-07-04 PROCEDURE — 97535 SELF CARE MNGMENT TRAINING: CPT

## 2021-07-04 PROCEDURE — 99232 SBSQ HOSP IP/OBS MODERATE 35: CPT | Performed by: INTERNAL MEDICINE

## 2021-07-04 PROCEDURE — 25010000002 ENOXAPARIN PER 10 MG: Performed by: NURSE PRACTITIONER

## 2021-07-04 PROCEDURE — 83735 ASSAY OF MAGNESIUM: CPT | Performed by: NURSE PRACTITIONER

## 2021-07-04 RX ADMIN — MONTELUKAST 10 MG: 10 TABLET, FILM COATED ORAL at 22:02

## 2021-07-04 RX ADMIN — GABAPENTIN 300 MG: 300 CAPSULE ORAL at 22:02

## 2021-07-04 RX ADMIN — LEVOTHYROXINE SODIUM 25 MCG: 0.03 TABLET ORAL at 05:39

## 2021-07-04 RX ADMIN — DOXYCYCLINE 100 MG: 100 TABLET, FILM COATED ORAL at 22:02

## 2021-07-04 RX ADMIN — MAGNESIUM SULFATE HEPTAHYDRATE 1 G: 1 INJECTION, SOLUTION INTRAVENOUS at 06:52

## 2021-07-04 RX ADMIN — GUAIFENESIN 600 MG: 600 TABLET, EXTENDED RELEASE ORAL at 22:02

## 2021-07-04 RX ADMIN — LOSARTAN POTASSIUM 100 MG: 50 TABLET, FILM COATED ORAL at 06:22

## 2021-07-04 RX ADMIN — ESCITALOPRAM OXALATE 10 MG: 10 TABLET ORAL at 06:23

## 2021-07-04 RX ADMIN — HYDROCODONE BITARTRATE AND ACETAMINOPHEN 1 TABLET: 5; 325 TABLET ORAL at 06:22

## 2021-07-04 RX ADMIN — HYDROCODONE BITARTRATE AND ACETAMINOPHEN 1 TABLET: 5; 325 TABLET ORAL at 15:17

## 2021-07-04 RX ADMIN — GUAIFENESIN 600 MG: 600 TABLET, EXTENDED RELEASE ORAL at 08:59

## 2021-07-04 RX ADMIN — DOXYCYCLINE 100 MG: 100 TABLET, FILM COATED ORAL at 09:00

## 2021-07-04 RX ADMIN — IPRATROPIUM BROMIDE 0.5 MG: 0.5 SOLUTION RESPIRATORY (INHALATION) at 06:50

## 2021-07-04 RX ADMIN — METOPROLOL SUCCINATE 100 MG: 50 TABLET, EXTENDED RELEASE ORAL at 06:23

## 2021-07-04 RX ADMIN — IPRATROPIUM BROMIDE 0.5 MG: 0.5 SOLUTION RESPIRATORY (INHALATION) at 16:16

## 2021-07-04 RX ADMIN — HYDRALAZINE HYDROCHLORIDE 100 MG: 25 TABLET, FILM COATED ORAL at 22:02

## 2021-07-04 RX ADMIN — GABAPENTIN 300 MG: 300 CAPSULE ORAL at 15:15

## 2021-07-04 RX ADMIN — ENOXAPARIN SODIUM 40 MG: 40 INJECTION SUBCUTANEOUS at 15:15

## 2021-07-04 RX ADMIN — ONDANSETRON HYDROCHLORIDE 8 MG: 4 TABLET, FILM COATED ORAL at 15:16

## 2021-07-04 RX ADMIN — HYDROCODONE BITARTRATE AND ACETAMINOPHEN 1 TABLET: 5; 325 TABLET ORAL at 22:02

## 2021-07-04 RX ADMIN — IPRATROPIUM BROMIDE 0.5 MG: 0.5 SOLUTION RESPIRATORY (INHALATION) at 19:17

## 2021-07-04 RX ADMIN — ONDANSETRON HYDROCHLORIDE 8 MG: 4 TABLET, FILM COATED ORAL at 06:22

## 2021-07-04 RX ADMIN — PANTOPRAZOLE SODIUM 40 MG: 40 TABLET, DELAYED RELEASE ORAL at 06:23

## 2021-07-04 RX ADMIN — IPRATROPIUM BROMIDE 0.5 MG: 0.5 SOLUTION RESPIRATORY (INHALATION) at 11:50

## 2021-07-04 RX ADMIN — HYDRALAZINE HYDROCHLORIDE 100 MG: 25 TABLET, FILM COATED ORAL at 08:59

## 2021-07-04 RX ADMIN — ONDANSETRON HYDROCHLORIDE 8 MG: 4 TABLET, FILM COATED ORAL at 22:02

## 2021-07-04 RX ADMIN — DIGOXIN 250 MCG: 250 TABLET ORAL at 22:01

## 2021-07-04 RX ADMIN — HYDRALAZINE HYDROCHLORIDE 100 MG: 25 TABLET, FILM COATED ORAL at 15:14

## 2021-07-04 RX ADMIN — GABAPENTIN 300 MG: 300 CAPSULE ORAL at 09:00

## 2021-07-04 RX ADMIN — ASPIRIN 325 MG ORAL TABLET 325 MG: 325 PILL ORAL at 08:59

## 2021-07-04 RX ADMIN — NIFEDIPINE 60 MG: 60 TABLET, FILM COATED, EXTENDED RELEASE ORAL at 09:00

## 2021-07-04 NOTE — PROGRESS NOTES
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)    Transaminitis    Overweight (BMI 25.0-29.9)    Normocytic anemia due to blood loss    Assessment    Dyspnea with hypoxia  Patient dropped to 87% on room air; continue with 3 liters    Subclavian steal syndrome of left subclavian artery  Bilateral carotid artery stenosis  -status post left common carotid artery to subclavian artery bypass with 6 mm PTFE ring supported graft (07/01/21)    Hyponatremia sodium improved today 127    Normocytic anemia due to blood loss  -hgb 9.8 today    Asthma / COPD (chronic obstructive pulmonary disease) with chronic bronchitis   -stable without exacerbation    Transaminitis  -,  today compared to 20 and 27 on 02/22/21  -discontinue statin      Plan    Increased left lower lobe density with low-grade fever cannot rule out early pneumonia  Empiric antibiotic doxy   Diet advanced to healthy heart  Monitor labs and electrolytes  Duo-nebs/symbicort  DVT prophylaxis lovenox           LOS: 4 days       Subjective         Objective     Vital signs for last 24 hours:  Vitals:    07/04/21 0533 07/04/21 0650 07/04/21 0654 07/04/21 0859   BP: 159/63   168/43   BP Location: Left arm      Patient Position: Lying      Pulse: 61 65 66 56   Resp: 16 18 18    Temp: 97.9 °F (36.6 °C)      TempSrc: Oral      SpO2: 94% 95%     Weight: 75.9 kg (167 lb 5.3 oz)      Height:           Intake/Output last 3 shifts:  I/O last 3 completed shifts:  In: 480 [P.O.:480]  Out: 2100 [Urine:2100]  Intake/Output this shift:  No intake/output data recorded.      Radiology  Imaging Results (Last 24  Hours)       ** No results found for the last 24 hours. **            Labs:  Results from last 7 days   Lab Units 07/04/21  0212   WBC 10*3/mm3 6.40   HEMOGLOBIN g/dL 9.2*   HEMATOCRIT % 26.4*   PLATELETS 10*3/mm3 236     Results from last 7 days   Lab Units 07/04/21  0212   SODIUM mmol/L 128*   POTASSIUM mmol/L 4.3   CHLORIDE mmol/L 93*   CO2 mmol/L 27.0   BUN mg/dL 7*   CREATININE mg/dL 0.66   CALCIUM mg/dL 8.2*   BILIRUBIN mg/dL 0.3   ALK PHOS U/L 27*   ALT (SGPT) U/L 70*   AST (SGOT) U/L 45*   GLUCOSE mg/dL 98         Results from last 7 days   Lab Units 07/04/21  0212 07/03/21  0329 07/02/21  0403   ALBUMIN g/dL 3.60 3.70 3.80             Results from last 7 days   Lab Units 07/04/21  0212   MAGNESIUM mg/dL 1.8                   Meds:   SCHEDULE  aspirin, 325 mg, Oral, Daily  budesonide-formoterol, 2 puff, Inhalation, BID - RT  digoxin, 250 mcg, Oral, Nightly  doxycycline, 100 mg, Oral, Q12H  enoxaparin, 40 mg, Subcutaneous, Daily  escitalopram, 10 mg, Oral, QAM  gabapentin, 300 mg, Oral, TID  guaiFENesin, 600 mg, Oral, Q12H  hydrALAZINE, 100 mg, Oral, TID  ipratropium, 0.5 mg, Nebulization, 4x Daily - RT  levothyroxine, 25 mcg, Oral, Q AM  losartan, 100 mg, Oral, QAM  metoprolol succinate XL, 100 mg, Oral, QAM  montelukast, 10 mg, Oral, Nightly  NIFEdipine XL, 60 mg, Oral, Q24H  pantoprazole, 40 mg, Oral, QAM      Infusions     PRNs    acetaminophen    albuterol sulfate HFA    HYDROcodone-acetaminophen    labetalol    magnesium sulfate **OR** magnesium sulfate in D5W 1g/100mL (PREMIX)    Morphine **AND** naloxone    nitroglycerin    ondansetron    ondansetron    potassium chloride **OR** potassium chloride **OR** potassium chloride    potassium phosphate infusion greater than 15 mMoles **OR** potassium phosphate infusion greater than 15 mMoles **OR** potassium phosphate **OR** sodium phosphate IVPB **OR** sodium phosphate IVPB **OR** sodium phosphate IVPB    Physical Exam:  Physical Exam  Pulmonary:       Effort: Pulmonary effort is normal.      Breath sounds: Normal breath sounds.   Skin:     General: Skin is warm and dry.      Coloration: Skin is pale.   Neurological:      Mental Status: She is alert and oriented to person, place, and time.         ROS  Review of Systems   Respiratory: Positive for cough.        I reviewed the patient's new clinical results.      Electronically signed by RICKY Thomas, 07/04/21 at 11:02 EDT.

## 2021-07-04 NOTE — PLAN OF CARE
Pt resting. C/o pain and nausea treated per MAR. Blood pressure and heart rate monitored closely along with surgical sites. All pulses palpable. Pt will need to be encouraged to make decision r/t discharge: IP rehab vs. C. Pt ambulates to BSC well with stand by assistance. VSS. Will continue to monitor.

## 2021-07-04 NOTE — PLAN OF CARE
Subjective: Pt agreeable to therapeutic plan of care. Patient stated she wants to go home with HH.     Objective:     Bed mobility - Modified-Independent  Transfers - Supervision and with rolling walker  Ambulation - 80 feet Supervision and with rolling walker    Pain: 7 VAS L neck and sh due to sx    Education: Provided education on importance of mobility and skilled verbal / tactile cueing throughout intervention.     Assessment: Gali Dover presents with functional mobility impairments which indicate the need for skilled intervention. Tolerating session today without incident.Presents with slow, guarded joanie, no LOB noted. Demonstrates decreased stride  Bilat.  Will continue to follow and progress as tolerated.     Plan/Recommendations:   Pt would benefit from Home with Home Health at discharge from facility and requires rolling walker, at discharge.   Pt desires Home with Home Health at discharge.Sats 95% and HR 74. Pt cooperative; agreeable to therapeutic recommendations and plan of care.     Basic Mobility 6-click:  Rollin = Total, A lot = 2, A little = 3; 4 = None  Supine>Sit:   1 = Total, A lot = 2, A little = 3; 4 = None   Sit>Stand with arms:  1 = Total, A lot = 2, A little = 3; 4 = None  Bed>Chair:   1 = Total, A lot = 2, A little = 3; 4 = None  Ambulate in room:  1 = Total, A lot = 2, A little = 3; 4 = None  3-5 Steps with railin = Total, A lot = 2, A little = 3; 4 = None  Score: 18    Post-Tx Position: Up in Chair, Alarms activated and Call light and personal items within reach  PPE: gloves, surgical mask, eyewear protection

## 2021-07-04 NOTE — PLAN OF CARE
Goal Outcome Evaluation:  Plan of Care Reviewed With: patient        Progress: improving  Outcome Summary: Pt is stable and resting in bed. Feeling much better than yesterday. Still has productive cough. Will continue to monitor.

## 2021-07-04 NOTE — PROGRESS NOTES
[facility] Medicine Services Daily Progress Note      Hospitalist Team  LOS 4 days      Patient Care Team:  Chris Pedro MD as PCP - General (Internal Medicine)  Navid Hassan MD as Surgeon (Vascular Surgery)  Rubén Avitia MD as Consulting Physician (Cardiology)  Dante Langston MD as Consulting Physician (Rheumatology)  Laurita Swanson APRN as Consulting Physician (Nurse Practitioner)  Carmelita Cruz MD as Consulting Physician (Pulmonary Disease)    Patient Location: 4110/1      Subjective   Subjective     Chief Complaint / Subjective  No chief complaint on file.        Brief Synopsis of Hospital Course/HPI  Patient is a 75-year-old female with subclavian steal syndrome.  The patient is postop day 3 from left-sided repair.  The patient overall is doing well.      Date:  7/4/2021  Overall the patient is feeling much better today.  Initially she thought that she could manage with 3 days a week of therapy but has changed her mind now and wishes to undergo inpatient rehab.  Consultation has been requested.        Review of Systems   Constitutional: Negative.   HENT: Negative.    Eyes: Negative.    Cardiovascular: Negative.    Respiratory: Negative.    Endocrine: Negative.    Hematologic/Lymphatic: Negative.    Skin: Negative.    Musculoskeletal: Negative.         Some incisional pain as would be expected.   Gastrointestinal: Negative.    Genitourinary: Negative.    Neurological: Negative.    Psychiatric/Behavioral: Negative.    Allergic/Immunologic: Negative.    All other systems reviewed and are negative.    Objective   Objective      Vital Signs  Temp:  [97.6 °F (36.4 °C)-98.9 °F (37.2 °C)] 98.9 °F (37.2 °C)  Heart Rate:  [51-66] 58  Resp:  [16-20] 20  BP: (119-168)/(41-63) 120/41  Oxygen Therapy  SpO2: 96 %  Pulse Oximetry Type: Intermittent  Device (Oxygen Therapy): nasal cannula  Flow (L/min): 2  Oxygen Concentration (%): 28  Flowsheet Rows      First Filed Value  "  Admission Height  165.1 cm (65\") Documented at 06/30/2021 0551   Admission Weight  73.3 kg (161 lb 9.6 oz) Documented at 06/30/2021 0551        Intake & Output (last 3 days)       07/01 0701 - 07/02 0700 07/02 0701 - 07/03 0700 07/03 0701 - 07/04 0700 07/04 0701 - 07/05 0700    P.O. 1020 360 480 240    I.V. (mL/kg)        Total Intake(mL/kg) 1020 (14) 360 (4.9) 480 (6.3) 240 (3.2)    Urine (mL/kg/hr)   2100 (1.2)     Blood        Total Output   2100     Net +1020 +360 -1620 +240            Urine Unmeasured Occurrence 2 x 2 x 1 x         Lines, Drains & Airways    Active LDAs     Name:   Placement date:   Placement time:   Site:   Days:    Peripheral IV 06/30/21 0625 Posterior;Right Wrist   06/30/21 0625    Wrist   3            Physical Exam:  Physical Exam  Vitals and nursing note reviewed.   Constitutional:       General: She is not in acute distress.     Appearance: Normal appearance. She is well-developed. She is not ill-appearing, toxic-appearing or diaphoretic.   HENT:      Head: Normocephalic and atraumatic.      Right Ear: Ear canal and external ear normal.      Left Ear: Ear canal and external ear normal.      Nose: Nose normal. No congestion or rhinorrhea.      Mouth/Throat:      Mouth: Mucous membranes are moist.      Pharynx: No oropharyngeal exudate.   Eyes:      General: No scleral icterus.        Right eye: No discharge.         Left eye: No discharge.      Extraocular Movements: Extraocular movements intact.      Conjunctiva/sclera: Conjunctivae normal.      Pupils: Pupils are equal, round, and reactive to light.   Neck:      Thyroid: No thyromegaly.      Vascular: No carotid bruit or JVD.      Trachea: No tracheal deviation.   Cardiovascular:      Rate and Rhythm: Normal rate and regular rhythm.      Pulses: Normal pulses.      Heart sounds: Normal heart sounds. No murmur heard.   No friction rub. No gallop.    Pulmonary:      Effort: Pulmonary effort is normal. No respiratory distress.      " Breath sounds: Normal breath sounds. No stridor. No wheezing, rhonchi or rales.   Chest:      Chest wall: No tenderness.   Abdominal:      General: Bowel sounds are normal. There is no distension.      Palpations: Abdomen is soft. There is no mass.      Tenderness: There is no abdominal tenderness. There is no guarding or rebound.      Hernia: No hernia is present.   Musculoskeletal:         General: No swelling, tenderness, deformity or signs of injury. Normal range of motion.      Cervical back: Normal range of motion and neck supple. No rigidity. No muscular tenderness.      Right lower leg: No edema.      Left lower leg: No edema.   Lymphadenopathy:      Cervical: No cervical adenopathy.   Skin:     General: Skin is warm and dry.      Coloration: Skin is not jaundiced or pale.      Findings: No bruising, erythema or rash.   Neurological:      General: No focal deficit present.      Mental Status: She is alert and oriented to person, place, and time. Mental status is at baseline.      Cranial Nerves: No cranial nerve deficit.      Sensory: No sensory deficit.      Motor: No weakness or abnormal muscle tone.      Coordination: Coordination normal.   Psychiatric:         Mood and Affect: Mood normal.         Behavior: Behavior normal.         Thought Content: Thought content normal.         Judgment: Judgment normal.              Wounds (last 24 hours)      LDA Wound     Row Name 07/04/21 1100 07/04/21 0715 07/04/21 0210       Wound 06/30/21 0941 Left neck Incision    Wound - Properties Group Placement Date: 06/30/21  -LT Placement Time: 0941  -LT Side: Left  -LT Location: neck  -LT Primary Wound Type: Incision  -LT    Dressing Appearance  open to air  -XC  open to air  -XC  open to air  -MT    Closure  Liquid skin adhesive  -XC  Liquid skin adhesive  -XC  Liquid skin adhesive  -MT    Base  pink  -XC  pink  -XC  pink  -MT    Periwound  --  --  intact  -MT    Retired Wound - Properties Group Date first assessed:  06/30/21  -LT Time first assessed: 0941 -LT Side: Left  -LT Location: neck  -LT Primary Wound Type: Incision  -LT    Row Name 07/03/21 2119 07/03/21 2025          Wound 06/30/21 0941 Left neck Incision    Wound - Properties Group Placement Date: 06/30/21  -LT Placement Time: 0941 -LT Side: Left  -LT Location: neck  -LT Primary Wound Type: Incision  -LT    Dressing Appearance  open to air  -MT  open to air;intact  -MT     Closure  Liquid skin adhesive  -MT  Liquid skin adhesive  -MT     Base  pink  -MT  pink  -MT     Periwound  intact  -MT  intact  -MT     Retired Wound - Properties Group Date first assessed: 06/30/21  -LT Time first assessed: 0941  -LT Side: Left  -LT Location: neck  -LT Primary Wound Type: Incision  -LT      User Key  (r) = Recorded By, (t) = Taken By, (c) = Cosigned By    Initials Name Provider Type    LT Michelle Montana, RN Registered Nurse     Gautam Nguyen RN Registered Nurse    Sonya Hernandez RN Registered Nurse      Procedures:  Procedure(s):  CAROTID SUBCLAVIAN BYPASS    Results Review:     I reviewed the patient's new clinical results.    Lab Results (last 24 hours)     Procedure Component Value Units Date/Time    Respiratory Culture - Sputum, Cough [[id number]] Collected: 07/03/21 0016    Specimen: Sputum from Cough Updated: 07/04/21 0941     Respiratory Culture Scant growth (1+) Normal Respiratory Vita: NO S.aureus/MRSA or Pseudomonas aeruginosa     Gram Stain Many (4+) WBCs per low power field      Rare (1+) Epithelial cells per low power field      Moderate (3+) Mixed bacterial morphotypes seen on Gram Stain    Magnesium [[id number]]  (Normal) Collected: 07/04/21 0212    Specimen: Blood Updated: 07/04/21 0311     Magnesium 1.8 mg/dL     Comprehensive Metabolic Panel [[id number]]  (Abnormal) Collected: 07/04/21 0212    Specimen: Blood Updated: 07/04/21 0307     Glucose 98 mg/dL      BUN 7 mg/dL      Creatinine 0.66 mg/dL      Sodium 128 mmol/L      Potassium 4.3 mmol/L       Chloride 93 mmol/L      CO2 27.0 mmol/L      Calcium 8.2 mg/dL      Total Protein 5.1 g/dL      Albumin 3.60 g/dL      ALT (SGPT) 70 U/L      AST (SGOT) 45 U/L      Alkaline Phosphatase 27 U/L      Total Bilirubin 0.3 mg/dL      eGFR Non African Amer 87 mL/min/1.73      Globulin 1.5 gm/dL      A/G Ratio 2.4 g/dL      BUN/Creatinine Ratio 10.6     Anion Gap 8.0 mmol/L     Narrative:      GFR Normal >60  Chronic Kidney Disease <60  Kidney Failure <15      Phosphorus [[id number]]  (Normal) Collected: 07/04/21 0212    Specimen: Blood Updated: 07/04/21 0307     Phosphorus 3.1 mg/dL     CBC & Differential [[id number]]  (Abnormal) Collected: 07/04/21 0212    Specimen: Blood Updated: 07/04/21 0248    Narrative:      The following orders were created for panel order CBC & Differential.  Procedure                               Abnormality         Status                     ---------                               -----------         ------                     CBC Auto Differential[[id number]]        Abnormal            Final result                 Please view results for these tests on the individual orders.    CBC Auto Differential [[id number]]  (Abnormal) Collected: 07/04/21 0212    Specimen: Blood Updated: 07/04/21 0248     WBC 6.40 10*3/mm3      RBC 3.02 10*6/mm3      Hemoglobin 9.2 g/dL      Hematocrit 26.4 %      MCV 87.6 fL      MCH 30.3 pg      MCHC 34.6 g/dL      RDW 13.7 %      RDW-SD 41.6 fl      MPV 8.6 fL      Platelets 236 10*3/mm3      Neutrophil % 48.0 %      Lymphocyte % 27.5 %      Monocyte % 14.1 %      Eosinophil % 9.1 %      Basophil % 1.3 %      Neutrophils, Absolute 3.10 10*3/mm3      Lymphocytes, Absolute 1.80 10*3/mm3      Monocytes, Absolute 0.90 10*3/mm3      Eosinophils, Absolute 0.60 10*3/mm3      Basophils, Absolute 0.10 10*3/mm3      nRBC 0.2 /100 WBC         No results found for: HGBA1C            No results found for: LIPASE  Lab Results   Component Value Date    CHOL 150 07/01/2021    CHLPL 191  08/03/2020    TRIG 131 07/01/2021    HDL 34 (L) 07/01/2021    LDL 92 07/01/2021       Lab Results   Lab Value Date/Time    FINALDX  11/11/2020 0000     1. Oral Cavity Upper Roof of Mouth, Biopsy: Benign squamous epithelium with   A. Reactive changes (Focal parakeratosis)   B. No dysplasia.     Juan Diego/kds         Microbiology Results (last 10 days)     Procedure Component Value - Date/Time    Respiratory Culture - Sputum, Cough [[id number]] Collected: 07/03/21 0016    Lab Status: Preliminary result Specimen: Sputum from Cough Updated: 07/04/21 0941     Respiratory Culture Scant growth (1+) Normal Respiratory Vita: NO S.aureus/MRSA or Pseudomonas aeruginosa     Gram Stain Many (4+) WBCs per low power field      Rare (1+) Epithelial cells per low power field      Moderate (3+) Mixed bacterial morphotypes seen on Gram Stain    COVID PRE-OP / PRE-PROCEDURE SCREENING ORDER (NO ISOLATION) - Swab, Nasopharynx [[id number]]  (Normal) Collected: 06/28/21 1151    Lab Status: Final result Specimen: Swab from Nasopharynx Updated: 06/28/21 2005    Narrative:      The following orders were created for panel order COVID PRE-OP / PRE-PROCEDURE SCREENING ORDER (NO ISOLATION) - Swab, Nasopharynx.  Procedure                               Abnormality         Status                     ---------                               -----------         ------                     COVID-19,APTIMA PANTHER,...[[id number]]  Normal              Final result                 Please view results for these tests on the individual orders.    COVID-19,APTIMA PANTHERJENNIFER IN-HOUSE, NP/OP SWAB IN UTM/VTM/SALINE TRANSPORT MEDIA,24 HR TAT - Swab, Nasopharynx [[id number]]  (Normal) Collected: 06/28/21 1151    Lab Status: Final result Specimen: Swab from Nasopharynx Updated: 06/28/21 2005     COVID19 Not Detected    Narrative:      Fact sheet for providers: https://www.fda.gov/media/933453/download     Fact sheet for patients:  https://www.fda.gov/media/084693/download    Test performed by RT PCR.          ECG/EMG Results (most recent)     None          Results for orders placed during the hospital encounter of 06/01/21    Duplex carotid ultrasound CAR    Interpretation Summary  · Proximal right internal carotid artery moderate stenosis.  · Proximal left internal carotid artery moderate stenosis.  · Left Vertebral: Retrograde flow noted.          XR Chest 1 View    Result Date: 7/2/2021   1. Increased congestive changes have developed throughout the lungs and central hilar areas. No large effusion is noted.   Electronically Signed By-Steven Mayers MD On:7/2/2021 12:49 PM This report was finalized on [id number] by  Steven Mayers MD.          Xrays, labs reviewed personally by physician.    Medication Review:   I have reviewed the patient's current medication list      Scheduled Meds  aspirin, 325 mg, Oral, Daily  budesonide-formoterol, 2 puff, Inhalation, BID - RT  digoxin, 250 mcg, Oral, Nightly  doxycycline, 100 mg, Oral, Q12H  enoxaparin, 40 mg, Subcutaneous, Daily  escitalopram, 10 mg, Oral, QAM  gabapentin, 300 mg, Oral, TID  guaiFENesin, 600 mg, Oral, Q12H  hydrALAZINE, 100 mg, Oral, TID  ipratropium, 0.5 mg, Nebulization, 4x Daily - RT  levothyroxine, 25 mcg, Oral, Q AM  losartan, 100 mg, Oral, QAM  metoprolol succinate XL, 100 mg, Oral, QAM  montelukast, 10 mg, Oral, Nightly  NIFEdipine XL, 60 mg, Oral, Q24H  pantoprazole, 40 mg, Oral, QAM        Meds Infusions       Meds PRN  •  acetaminophen  •  albuterol sulfate HFA  •  HYDROcodone-acetaminophen  •  labetalol  •  magnesium sulfate **OR** magnesium sulfate in D5W 1g/100mL (PREMIX)  •  Morphine **AND** naloxone  •  nitroglycerin  •  ondansetron  •  ondansetron  •  potassium chloride **OR** potassium chloride **OR** potassium chloride  •  potassium phosphate infusion greater than 15 mMoles **OR** potassium phosphate infusion greater than 15 mMoles **OR** potassium  phosphate **OR** sodium phosphate IVPB **OR** sodium phosphate IVPB **OR** sodium phosphate IVPB        Assessment/Plan   Assessment/Plan     Active Hospital Problems    Diagnosis  POA   • **Subclavian steal syndrome of left subclavian artery [G45.8]  Yes   • Transaminitis [R74.01]  No   • Overweight (BMI 25.0-29.9) [E66.3]  Yes   • Normocytic anemia due to blood loss [D50.0]  No   • Bilateral carotid artery stenosis [I65.23]  Yes   • Asthma [J45.909]  Yes   • Depression [F32.9]  Yes   • Hyperlipidemia [E78.5]  Yes   • Polymyalgia  [M35.3]  Yes   • Hypothyroid [E03.9]  Yes   • Peripheral neuropathy [G62.9]  Yes   • Paroxysmal atrial fibrillation (CMS/HCC) [I48.0]  Yes   • 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       MEDICAL DECISION MAKING COMPLEXITY BY PROBLEM:   1.  Subclavian steal syndrome  -Postop day 3  -Overall doing fairly well  -Continue current medications    2.  Chronic obstructive pulmonary disease  -Continue breathing treatments and oxygen  -Patient is on 2 L/min per nasal cannula with a saturation of 96%.    3.  Hypertension  -Essential  -continue home medications for this issue    4.  Polymyalgia  -Well-controlled at present    5.  Bilateral carotid artery stenosis  -Management per vascular surgery    6.  Paroxysmal atrial fibrillation  -Consider anticoagulation once okay with with vascular surgery    Patient will need to be evaluated by physical and occupational therapy for inpatient rehab since she has now decided that is the way she would prefer to go.  Unfortunately they are unavailable today and we will request that they evaluate her in the morning along with case management.  VTE Prophylaxis -   Mechanical Order History:     None      Pharmalogical Order  History:      Ordered     Dose Route Frequency Stop    06/30/21 1129  enoxaparin (LOVENOX) syringe 40 mg      40 mg SC Daily --    06/30/21 0750  sodium chloride 500 mL with heparin (porcine) 5000 UNIT/ML 5,000 Units mixture  Status:  Discontinued      -- -- As Needed 06/30/21 1152            Code Status -   Code Status and Medical Interventions:   Ordered at: 06/30/21 1130     Code Status:    CPR     Medical Interventions (Level of Support Prior to Arrest):    Full   This patient has been examined wearing appropriate Personal Protective Equipment and discussed with hospital infection control department. 07/04/21    Discharge Planning  Per vascular surgery    Electronically signed by Janina Scott MD, 07/04/21, 16:29 EDT.  [facility] Hospitalist Team

## 2021-07-04 NOTE — PROGRESS NOTES
Name: Gali Dover ADMIT: 2021   : 1945  PCP: Chris Pedro MD    MRN: [id number] LOS: 4 days   AGE/SEX: 75 y.o. female  ROOM: [address] [place] [address]    [place], Post Op [facility]    No chief complaint on file.    CC: neck is sore  Subjective     75 y.o. female s/p left common carotid artery to subclavian artery bypass with PTFE.  Neurologically intact.  Minimal soreness.  Doing much better.  Desires discharge home as she feels too well for rehab    Review of Systems  Denies focal neurologic symptoms  Denies cardiopulmonary complaints  Objective     Scheduled Medications:   aspirin, 325 mg, Oral, Daily  budesonide-formoterol, 2 puff, Inhalation, BID - RT  digoxin, 250 mcg, Oral, Nightly  doxycycline, 100 mg, Oral, Q12H  enoxaparin, 40 mg, Subcutaneous, Daily  escitalopram, 10 mg, Oral, QAM  gabapentin, 300 mg, Oral, TID  guaiFENesin, 600 mg, Oral, Q12H  hydrALAZINE, 100 mg, Oral, TID  ipratropium, 0.5 mg, Nebulization, 4x Daily - RT  levothyroxine, 25 mcg, Oral, Q AM  losartan, 100 mg, Oral, QAM  metoprolol succinate XL, 100 mg, Oral, QAM  montelukast, 10 mg, Oral, Nightly  NIFEdipine XL, 60 mg, Oral, Q24H  pantoprazole, 40 mg, Oral, QAM        Active Infusions:       As Needed Medications:  •  acetaminophen  •  albuterol sulfate HFA  •  HYDROcodone-acetaminophen  •  labetalol  •  magnesium sulfate **OR** magnesium sulfate in D5W 1g/100mL (PREMIX)  •  Morphine **AND** naloxone  •  nitroglycerin  •  ondansetron  •  ondansetron  •  potassium chloride **OR** potassium chloride **OR** potassium chloride  •  potassium phosphate infusion greater than 15 mMoles **OR** potassium phosphate infusion greater than 15 mMoles **OR** potassium phosphate **OR** sodium phosphate IVPB **OR** sodium phosphate IVPB **OR** sodium phosphate IVPB    Vital Signs  Vital Signs   Patient Vitals for the past 24 hrs:   BP Temp Temp src Pulse Resp SpO2 Weight   21 1505 120/41 98.9 °F (37.2 °C) Oral 56 16 97 % --   21  1154 -- -- -- 53 18 -- --   07/04/21 1150 -- -- -- 58 18 95 % --   07/04/21 1129 119/53 97.8 °F (36.6 °C) Oral 51 20 96 % --   07/04/21 0859 168/43 -- -- 56 -- -- --   07/04/21 0654 -- -- -- 66 18 -- --   07/04/21 0650 -- -- -- 65 18 95 % --   07/04/21 0533 159/63 97.9 °F (36.6 °C) Oral 61 16 94 % 75.9 kg (167 lb 5.3 oz)   07/04/21 0440 -- -- -- 63 -- -- --   07/03/21 2119 158/59 97.6 °F (36.4 °C) Oral 63 18 96 % --   07/03/21 1940 -- -- -- 63 18 96 % --   07/03/21 1936 -- -- -- 64 18 95 % --   07/03/21 1647 -- -- -- 56 -- -- --   07/03/21 1630 137/63 -- -- -- -- -- --     I/O:     Intake/Output Summary (Last 24 hours) at 7/4/2021 1527  Last data filed at 7/4/2021 1505  Gross per 24 hour   Intake 720 ml   Output 1600 ml   Net -880 ml     BMI:  Body mass index is 27.85 kg/m².    Physical Exam:  Physical Exam   Left sided incision site swollen, expected tenderness.  No new focal neurologic deficits.  Palpable radial pulse.  Results Review:     CBC    Results from last 7 days   Lab Units 07/04/21 0212 07/03/21 0329 07/02/21  0403 07/01/21  0550 06/30/21  1542   WBC 10*3/mm3 6.40 7.00 7.40 8.90 10.10   HEMOGLOBIN g/dL 9.2* 9.2* 9.8* 9.1* 10.1*   PLATELETS 10*3/mm3 236 230 234 243 262     BMP   Results from last 7 days   Lab Units 07/04/21 0212 07/03/21  0329 07/02/21  0403 07/01/21  0550 06/30/21  1542   SODIUM mmol/L 128* 127* 124* 130* 134*   POTASSIUM mmol/L 4.3 3.5 3.9 4.1 4.4   CHLORIDE mmol/L 93* 91* 91* 96* 99   CO2 mmol/L 27.0 27.0 23.0 23.0 23.0   BUN mg/dL 7* 7* 8 8 9   CREATININE mg/dL 0.66 0.65 0.60 0.69 0.73   GLUCOSE mg/dL 98 114* 102* 102* 168*   MAGNESIUM mg/dL 1.8 1.4* 1.6 1.7 1.4*   PHOSPHORUS mg/dL 3.1 3.2 3.4 3.7 4.2     Coag     HbA1C No results found for: HGBA1C  Infection   Results from last 7 days   Lab Units 07/03/21  0016   RESPCX  Scant growth (1+) Normal Respiratory Vita: NO S.aureus/MRSA or Pseudomonas aeruginosa     Radiology(recent) No radiology results for the last  day    Assessment/Plan     Assessment & Plan      Subclavian steal syndrome of left subclavian artery    Bilateral carotid artery stenosis    Anxiety    Asthma    Paroxysmal atrial fibrillation (CMS/HCC)    Depression    GERD (gastroesophageal reflux disease)    Hyperlipidemia    Essential hypertension    Polymyalgia     Hypothyroid    Peripheral neuropathy    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 female POD #4  left common carotid artery to subclavian artery bypass with PTFE.  Tolerated procedure well.  Appropriate postsurgical soreness now improving.  No new focal neurologic symptoms.  Having some hemoptysis and lab value dyscrasias.  Medical work-up underway.  Clinically she is doing well from our standpoint now and we will sign off.  Follow-up in 1 month in the vascular clinic for surveillance duplex of the graft and evaluation.    Personal protective equipment used for this patient encounter:  Patient wearing surgical mask []    Provider wearing a surgical mask [x]    Gloves [x]    Eye protection [x]    Face Shield []    Gown []    N 95 respirator or CAPR/PAPR []   Duration of interaction 10 minutes    Glenn Frost MD  07/04/21  15:27 EDT    Please call my office with any question: [phone]    Active Hospital Problems    Diagnosis  POA   • **Subclavian steal syndrome of left subclavian artery [G45.8]  Yes   • Transaminitis [R74.01]  No   • Overweight (BMI 25.0-29.9) [E66.3]  Yes   • Normocytic anemia due to blood loss [D50.0]  No   • Bilateral carotid artery stenosis [I65.23]  Yes   • Asthma [J45.909]  Yes   • Depression [F32.9]  Yes   • Hyperlipidemia [E78.5]  Yes   • Polymyalgia  [M35.3]  Yes   • Hypothyroid [E03.9]  Yes   • Peripheral neuropathy [G62.9]  Yes   • Paroxysmal atrial fibrillation (CMS/HCC) [I48.0]  Yes   • COPD (chronic obstructive pulmonary  disease) with chronic bronchitis (CMS/HCC) [J44.9]  Yes   • Anxiety [F41.9]  Yes   • GERD (gastroesophageal reflux disease) [K21.9]  Yes   • Essential hypertension [I10]  Yes      Resolved Hospital Problems   No resolved problems to display.

## 2021-07-05 VITALS
BODY MASS INDEX: 29.07 KG/M2 | WEIGHT: 174.5 LBS | HEIGHT: 65 IN | DIASTOLIC BLOOD PRESSURE: 62 MMHG | HEART RATE: 61 BPM | RESPIRATION RATE: 18 BRPM | OXYGEN SATURATION: 94 % | SYSTOLIC BLOOD PRESSURE: 147 MMHG | TEMPERATURE: 98.3 F

## 2021-07-05 PROBLEM — J44.1 COPD EXACERBATION (HCC): Status: ACTIVE | Noted: 2021-07-05

## 2021-07-05 PROBLEM — R74.01 TRANSAMINITIS: Status: RESOLVED | Noted: 2021-07-01 | Resolved: 2021-07-05

## 2021-07-05 PROBLEM — G45.8 SUBCLAVIAN STEAL SYNDROME OF LEFT SUBCLAVIAN ARTERY: Status: RESOLVED | Noted: 2021-07-01 | Resolved: 2021-07-05

## 2021-07-05 LAB
ALBUMIN SERPL-MCNC: 3.5 G/DL (ref 3.5–5.2)
ALBUMIN/GLOB SERPL: 1.8 G/DL
ALP SERPL-CCNC: 25 U/L (ref 39–117)
ALT SERPL W P-5'-P-CCNC: 61 U/L (ref 1–33)
ANION GAP SERPL CALCULATED.3IONS-SCNC: 9 MMOL/L (ref 5–15)
AST SERPL-CCNC: 39 U/L (ref 1–32)
BACTERIA SPEC RESP CULT: NORMAL
BASOPHILS # BLD AUTO: 0.1 10*3/MM3 (ref 0–0.2)
BASOPHILS NFR BLD AUTO: 1.1 % (ref 0–1.5)
BILIRUB SERPL-MCNC: 0.4 MG/DL (ref 0–1.2)
BUN SERPL-MCNC: 7 MG/DL (ref 8–23)
BUN/CREAT SERPL: 10.6 (ref 7–25)
CALCIUM SPEC-SCNC: 8.4 MG/DL (ref 8.6–10.5)
CHLORIDE SERPL-SCNC: 96 MMOL/L (ref 98–107)
CO2 SERPL-SCNC: 25 MMOL/L (ref 22–29)
CREAT SERPL-MCNC: 0.66 MG/DL (ref 0.57–1)
DEPRECATED RDW RBC AUTO: 43.3 FL (ref 37–54)
EOSINOPHIL # BLD AUTO: 0.7 10*3/MM3 (ref 0–0.4)
EOSINOPHIL NFR BLD AUTO: 10.9 % (ref 0.3–6.2)
ERYTHROCYTE [DISTWIDTH] IN BLOOD BY AUTOMATED COUNT: 14.2 % (ref 12.3–15.4)
GFR SERPL CREATININE-BSD FRML MDRD: 87 ML/MIN/1.73
GLOBULIN UR ELPH-MCNC: 1.9 GM/DL
GLUCOSE SERPL-MCNC: 104 MG/DL (ref 65–99)
GRAM STN SPEC: NORMAL
HCT VFR BLD AUTO: 27.3 % (ref 34–46.6)
HGB BLD-MCNC: 9.4 G/DL (ref 12–15.9)
LYMPHOCYTES # BLD AUTO: 1.7 10*3/MM3 (ref 0.7–3.1)
LYMPHOCYTES NFR BLD AUTO: 25.3 % (ref 19.6–45.3)
MAGNESIUM SERPL-MCNC: 1.8 MG/DL (ref 1.6–2.4)
MCH RBC QN AUTO: 30.5 PG (ref 26.6–33)
MCHC RBC AUTO-ENTMCNC: 34.6 G/DL (ref 31.5–35.7)
MCV RBC AUTO: 88 FL (ref 79–97)
MONOCYTES # BLD AUTO: 0.9 10*3/MM3 (ref 0.1–0.9)
MONOCYTES NFR BLD AUTO: 13.4 % (ref 5–12)
NEUTROPHILS NFR BLD AUTO: 3.3 10*3/MM3 (ref 1.7–7)
NEUTROPHILS NFR BLD AUTO: 49.3 % (ref 42.7–76)
NRBC BLD AUTO-RTO: 0 /100 WBC (ref 0–0.2)
PHOSPHATE SERPL-MCNC: 4 MG/DL (ref 2.5–4.5)
PLATELET # BLD AUTO: 257 10*3/MM3 (ref 140–450)
PMV BLD AUTO: 8.6 FL (ref 6–12)
POTASSIUM SERPL-SCNC: 4.2 MMOL/L (ref 3.5–5.2)
PROT SERPL-MCNC: 5.4 G/DL (ref 6–8.5)
RBC # BLD AUTO: 3.1 10*6/MM3 (ref 3.77–5.28)
SODIUM SERPL-SCNC: 130 MMOL/L (ref 136–145)
WBC # BLD AUTO: 6.8 10*3/MM3 (ref 3.4–10.8)

## 2021-07-05 PROCEDURE — 85025 COMPLETE CBC W/AUTO DIFF WBC: CPT | Performed by: NURSE PRACTITIONER

## 2021-07-05 PROCEDURE — 63710000001 ONDANSETRON PER 8 MG: Performed by: INTERNAL MEDICINE

## 2021-07-05 PROCEDURE — 83735 ASSAY OF MAGNESIUM: CPT | Performed by: NURSE PRACTITIONER

## 2021-07-05 PROCEDURE — 97116 GAIT TRAINING THERAPY: CPT

## 2021-07-05 PROCEDURE — 94618 PULMONARY STRESS TESTING: CPT

## 2021-07-05 PROCEDURE — 84100 ASSAY OF PHOSPHORUS: CPT | Performed by: NURSE PRACTITIONER

## 2021-07-05 PROCEDURE — 99238 HOSP IP/OBS DSCHRG MGMT 30/<: CPT | Performed by: HOSPITALIST

## 2021-07-05 PROCEDURE — 80053 COMPREHEN METABOLIC PANEL: CPT | Performed by: NURSE PRACTITIONER

## 2021-07-05 PROCEDURE — 25010000002 FUROSEMIDE PER 20 MG: Performed by: NURSE PRACTITIONER

## 2021-07-05 PROCEDURE — 94799 UNLISTED PULMONARY SVC/PX: CPT

## 2021-07-05 PROCEDURE — 25010000002 MAGNESIUM SULFATE IN D5W 1G/100ML (PREMIX) 1-5 GM/100ML-% SOLUTION: Performed by: NURSE PRACTITIONER

## 2021-07-05 PROCEDURE — 97166 OT EVAL MOD COMPLEX 45 MIN: CPT

## 2021-07-05 RX ORDER — HYDROCODONE BITARTRATE AND ACETAMINOPHEN 5; 325 MG/1; MG/1
1 TABLET ORAL EVERY 4 HOURS PRN
Status: DISCONTINUED | OUTPATIENT
Start: 2021-07-05 | End: 2021-07-05 | Stop reason: SDUPTHER

## 2021-07-05 RX ORDER — ONDANSETRON 4 MG/1
4 TABLET, FILM COATED ORAL EVERY 6 HOURS PRN
Qty: 15 TABLET | Refills: 0 | Status: SHIPPED | OUTPATIENT
Start: 2021-07-05

## 2021-07-05 RX ORDER — GUAIFENESIN 600 MG/1
600 TABLET, EXTENDED RELEASE ORAL EVERY 12 HOURS SCHEDULED
Qty: 30 TABLET | Refills: 0 | Status: ON HOLD | OUTPATIENT
Start: 2021-07-05 | End: 2022-08-16

## 2021-07-05 RX ORDER — AMOXICILLIN 250 MG
2 CAPSULE ORAL 2 TIMES DAILY
Status: DISCONTINUED | OUTPATIENT
Start: 2021-07-05 | End: 2021-07-05 | Stop reason: HOSPADM

## 2021-07-05 RX ORDER — HYDROCODONE BITARTRATE AND ACETAMINOPHEN 5; 325 MG/1; MG/1
1 TABLET ORAL EVERY 4 HOURS PRN
Qty: 40 TABLET | Refills: 0 | Status: SHIPPED | OUTPATIENT
Start: 2021-07-05 | End: 2021-07-18 | Stop reason: SDUPTHER

## 2021-07-05 RX ORDER — FUROSEMIDE 10 MG/ML
20 INJECTION INTRAMUSCULAR; INTRAVENOUS DAILY
Status: DISCONTINUED | OUTPATIENT
Start: 2021-07-05 | End: 2021-07-05 | Stop reason: HOSPADM

## 2021-07-05 RX ORDER — DOXYCYCLINE 100 MG/1
100 TABLET ORAL EVERY 12 HOURS SCHEDULED
Qty: 7 TABLET | Refills: 0 | Status: SHIPPED | OUTPATIENT
Start: 2021-07-05 | End: 2021-07-09

## 2021-07-05 RX ORDER — HYDROCODONE BITARTRATE AND ACETAMINOPHEN 5; 325 MG/1; MG/1
1 TABLET ORAL EVERY 4 HOURS PRN
Qty: 10 TABLET | Refills: 0 | Status: ON HOLD | OUTPATIENT
Start: 2021-07-05 | End: 2022-08-16

## 2021-07-05 RX ORDER — POLYETHYLENE GLYCOL 3350 17 G/17G
17 POWDER, FOR SOLUTION ORAL DAILY
Status: DISCONTINUED | OUTPATIENT
Start: 2021-07-05 | End: 2021-07-05 | Stop reason: HOSPADM

## 2021-07-05 RX ADMIN — LEVOTHYROXINE SODIUM 25 MCG: 0.03 TABLET ORAL at 05:51

## 2021-07-05 RX ADMIN — PANTOPRAZOLE SODIUM 40 MG: 40 TABLET, DELAYED RELEASE ORAL at 06:22

## 2021-07-05 RX ADMIN — HYDRALAZINE HYDROCHLORIDE 100 MG: 25 TABLET, FILM COATED ORAL at 08:32

## 2021-07-05 RX ADMIN — POLYETHYLENE GLYCOL 3350 17 G: 17 POWDER, FOR SOLUTION ORAL at 09:24

## 2021-07-05 RX ADMIN — ASPIRIN 325 MG ORAL TABLET 325 MG: 325 PILL ORAL at 08:32

## 2021-07-05 RX ADMIN — HYDROCODONE BITARTRATE AND ACETAMINOPHEN 1 TABLET: 5; 325 TABLET ORAL at 06:22

## 2021-07-05 RX ADMIN — NIFEDIPINE 60 MG: 60 TABLET, FILM COATED, EXTENDED RELEASE ORAL at 08:32

## 2021-07-05 RX ADMIN — GUAIFENESIN 600 MG: 600 TABLET, EXTENDED RELEASE ORAL at 08:32

## 2021-07-05 RX ADMIN — IPRATROPIUM BROMIDE 0.5 MG: 0.5 SOLUTION RESPIRATORY (INHALATION) at 11:03

## 2021-07-05 RX ADMIN — LABETALOL 20 MG/4 ML (5 MG/ML) INTRAVENOUS SYRINGE 10 MG: at 00:13

## 2021-07-05 RX ADMIN — ESCITALOPRAM OXALATE 10 MG: 10 TABLET ORAL at 06:22

## 2021-07-05 RX ADMIN — METOPROLOL SUCCINATE 100 MG: 50 TABLET, EXTENDED RELEASE ORAL at 06:22

## 2021-07-05 RX ADMIN — MAGNESIUM SULFATE HEPTAHYDRATE 1 G: 1 INJECTION, SOLUTION INTRAVENOUS at 06:22

## 2021-07-05 RX ADMIN — DOXYCYCLINE 100 MG: 100 TABLET, FILM COATED ORAL at 08:32

## 2021-07-05 RX ADMIN — FUROSEMIDE 20 MG: 10 INJECTION, SOLUTION INTRAMUSCULAR; INTRAVENOUS at 13:39

## 2021-07-05 RX ADMIN — IPRATROPIUM BROMIDE 0.5 MG: 0.5 SOLUTION RESPIRATORY (INHALATION) at 06:58

## 2021-07-05 RX ADMIN — ONDANSETRON HYDROCHLORIDE 8 MG: 4 TABLET, FILM COATED ORAL at 06:22

## 2021-07-05 RX ADMIN — LABETALOL 20 MG/4 ML (5 MG/ML) INTRAVENOUS SYRINGE 10 MG: at 13:39

## 2021-07-05 RX ADMIN — DOCUSATE SODIUM 50 MG AND SENNOSIDES 8.6 MG 2 TABLET: 8.6; 5 TABLET, FILM COATED ORAL at 09:24

## 2021-07-05 RX ADMIN — LOSARTAN POTASSIUM 100 MG: 50 TABLET, FILM COATED ORAL at 06:21

## 2021-07-05 RX ADMIN — GABAPENTIN 300 MG: 300 CAPSULE ORAL at 08:32

## 2021-07-05 NOTE — PROGRESS NOTES
"PULMONARY CRITICAL CARE Progress  NOTE      PATIENT IDENTIFICATION:  Name: Gali Dover  MRN: [id number]  :  1945     Age: 75 y.o.  Sex: female    DATE OF Note:  2021   Referring Physician: Navid Hassan MD                  Subjective:   Feeling better, no new issue, no SOB no chest pain, no nausea or vomiting, no change in bowel habit, no dysuria,  no new  skin rash or itching.      Objective:  tMax 24 hrs: Temp (24hrs), Av.4 °F (36.9 °C), Min:97.8 °F (36.6 °C), Max:98.9 °F (37.2 °C)      Vitals Ranges:   Temp:  [97.8 °F (36.6 °C)-98.9 °F (37.2 °C)] 98.3 °F (36.8 °C)  Heart Rate:  [51-68] 59  Resp:  [14-20] 14  BP: (119-188)/(41-74) 129/56    Intake and Output Last 3 Shifts:   I/O last 3 completed shifts:  In: 2160 [P.O.:2160]  Out: 4250 [Urine:4250]    Exam:  /56 (BP Location: Left arm, Patient Position: Lying)   Pulse 59   Temp 98.3 °F (36.8 °C) (Oral)   Resp 14   Ht 165.1 cm (65\")   Wt 79.2 kg (174 lb 8 oz)   SpO2 95%   BMI 29.04 kg/m²     General Appearance:     HEENT:  Normocephalic, without obvious abnormality, Conjunctiva/corneas clear,.  Normal external ear canals, Nares normal, no drainage     Neck:  Supple, symmetrical, trachea midline. No JVD.  Lungs /Chest wall:   Bilateral basal rhonchi, respirations unlabored symmetrical wall movement.     Heart:  Regular rate and rhythm, systolic murmur PMI left sternal border  Abdomen: Soft, non-tender, no masses, no organomegaly.    Extremities: Trace edema no clubbing or Cyanosis        Medications:    Current Facility-Administered Medications:     acetaminophen (TYLENOL) tablet 650 mg, 650 mg, Oral, Q4H PRN, Jaime Skaggs APRN    albuterol sulfate HFA (PROVENTIL HFA;VENTOLIN HFA;PROAIR HFA) inhaler 2 puff, 2 puff, Inhalation, Q4H PRN, Jaime Skaggs APRN    aspirin tablet 325 mg, 325 mg, Oral, Daily, Jaime Skaggs APRN, 325 mg at 21 0832    budesonide-formoterol (SYMBICORT) 160-4.5 MCG/ACT inhaler 2 puff, 2 puff, " Inhalation, BID - RT, Jaime Skaggs E, APRN, 2 puff at 07/03/21 1936    digoxin (LANOXIN) tablet 250 mcg, 250 mcg, Oral, Nightly, Jaime Skaggs E, APRN, 250 mcg at 07/04/21 2201    doxycycline (ADOXA) tablet 100 mg, 100 mg, Oral, Q12H, [place] Valley[place]enna M, APRN, 100 mg at 07/05/21 0832    enoxaparin (LOVENOX) syringe 40 mg, 40 mg, Subcutaneous, Daily, LoveJaime, APRN, 40 mg at 07/04/21 1515    escitalopram (LEXAPRO) tablet 10 mg, 10 mg, Oral, QAM, Jaime Skaggs E, APRN, 10 mg at 07/05/21 0622    gabapentin (NEURONTIN) capsule 300 mg, 300 mg, Oral, TID, Pradeep Skaggsip E, APRN, 300 mg at 07/05/21 0832    guaiFENesin (MUCINEX) 12 hr tablet 600 mg, 600 mg, Oral, Q12H, [place]phania, APRN, 600 mg at 07/05/21 0832    hydrALAZINE (APRESOLINE) tablet 100 mg, 100 mg, Oral, TID, Jaime Skaggs E, APRN, 100 mg at 07/05/21 0832    HYDROcodone-acetaminophen (NORCO) 5-325 MG per tablet 1 tablet, 1 tablet, Oral, Q4H PRN, Pradeep Skaggsip E, APRN, 1 tablet at 07/05/21 0622    ipratropium (ATROVENT) nebulizer solution 0.5 mg, 0.5 mg, Nebulization, 4x Daily - RT, Jaime Skaggs E, APRN, 0.5 mg at 07/05/21 0658    labetalol (NORMODYNE,TRANDATE) injection 10 mg, 10 mg, Intravenous, Q4H PRN, Jaime Skaggs E, APRN, 10 mg at 07/05/21 0013    levothyroxine (SYNTHROID, LEVOTHROID) tablet 25 mcg, 25 mcg, Oral, Q AM, Jaime Skaggs E, APRN, 25 mcg at 07/05/21 0551    losartan (COZAAR) tablet 100 mg, 100 mg, Oral, QAM, Pradeep Skaggsip E, APRN, 100 mg at 07/05/21 0621    Magnesium Sulfate 2 gram infusion - Mg less than or equal to 1.5 mg/dL, 2 g, Intravenous, PRN, Last Rate: 25 mL/hr at 07/03/21 0919, 2 g at 07/03/21 0919 **OR** Magnesium Sulfate 1 gram infusion - Mg 1.6-1.9 mg/dL, 1 g, Intravenous, PRN, Jaime Skaggs APRN, Last Rate: 100 mL/hr at 07/05/21 0622, 1 g at 07/05/21 0622    metoprolol succinate XL (TOPROL-XL) 24 hr tablet 100 mg, 100 mg, Oral, Sharifa IRIZARRY Phillip E, APRN, 100 mg at 07/05/21 0622    montelukast (SINGULAIR) tablet 10 mg,  10 mg, Oral, Nightly, Jaime Skaggs, APRN, 10 mg at 07/04/21 2202    Morphine sulfate (PF) injection 2 mg, 2 mg, Intravenous, Q4H PRN, 2 mg at 07/01/21 0851 **AND** naloxone (NARCAN) injection 0.4 mg, 0.4 mg, Intravenous, Q5 Min PRN, Jaime Skaggs, APRN    NIFEdipine XL (PROCARDIA XL) 24 hr tablet 60 mg, 60 mg, Oral, Q24H, Jaime Skaggs, APRN, 60 mg at 07/05/21 0832    nitroglycerin (NITROSTAT) SL tablet 0.4 mg, 0.4 mg, Sublingual, Q5 Min PRN, Jaime Skaggs, APRN    ondansetron (ZOFRAN) injection 8 mg, 8 mg, Intravenous, Q6H PRN, Janina Scott MD, 8 mg at 07/03/21 0812    ondansetron (ZOFRAN) tablet 8 mg, 8 mg, Oral, Q6H PRN, Janina Scott MD, 8 mg at 07/05/21 0622    pantoprazole (PROTONIX) EC tablet 40 mg, 40 mg, Oral, QAM, Jaime Skaggs, APRN, 40 mg at 07/05/21 0622    polyethylene glycol (MIRALAX) packet 17 g, 17 g, Oral, Daily, Hansa-Rosy, Stefano I, DO, 17 g at 07/05/21 0924    potassium chloride (K-DUR,KLOR-CON) ER tablet 40 mEq, 40 mEq, Oral, PRN, 40 mEq at 07/03/21 1658 **OR** potassium chloride (KLOR-CON) packet 40 mEq, 40 mEq, Oral, PRN **OR** potassium chloride 10 mEq in 100 mL IVPB, 10 mEq, Intravenous, Q1H PRN, Jaime Skaggs, APRN    potassium phosphate 45 mmol in sodium chloride 0.9 % 500 mL infusion, 45 mmol, Intravenous, PRN **OR** potassium phosphate 30 mmol in sodium chloride 0.9 % 250 mL infusion, 30 mmol, Intravenous, PRN **OR** potassium phosphate 15 mmol in sodium chloride 0.9 % 100 mL infusion, 15 mmol, Intravenous, PRN **OR** sodium phosphates 45 mmol in sodium chloride 0.9 % 500 mL IVPB, 45 mmol, Intravenous, PRN **OR** sodium phosphates 30 mmol in sodium chloride 0.9 % 250 mL IVPB, 30 mmol, Intravenous, PRN **OR** sodium phosphates 15 mmol in sodium chloride 0.9 % 250 mL IVPB, 15 mmol, Intravenous, PRN, Jaime Skaggs APRN    sennosides-docusate (PERICOLACE) 8.6-50 MG per tablet 2 tablet, 2 tablet, Oral, BID, Stefano Hubbard DO, 2 tablet at 07/05/21 2231    Data  Review:  All labs (24hrs):   Recent Results (from the past 24 hour(s))   Magnesium    Collection Time: 07/05/21  2:17 AM    Specimen: Blood   Result Value Ref Range    Magnesium 1.8 1.6 - 2.4 mg/dL   Phosphorus    Collection Time: 07/05/21  2:17 AM    Specimen: Blood   Result Value Ref Range    Phosphorus 4.0 2.5 - 4.5 mg/dL   Comprehensive Metabolic Panel    Collection Time: 07/05/21  2:17 AM    Specimen: Blood   Result Value Ref Range    Glucose 104 (H) 65 - 99 mg/dL    BUN 7 (L) 8 - 23 mg/dL    Creatinine 0.66 0.57 - 1.00 mg/dL    Sodium 130 (L) 136 - 145 mmol/L    Potassium 4.2 3.5 - 5.2 mmol/L    Chloride 96 (L) 98 - 107 mmol/L    CO2 25.0 22.0 - 29.0 mmol/L    Calcium 8.4 (L) 8.6 - 10.5 mg/dL    Total Protein 5.4 (L) 6.0 - 8.5 g/dL    Albumin 3.50 3.50 - 5.20 g/dL    ALT (SGPT) 61 (H) 1 - 33 U/L    AST (SGOT) 39 (H) 1 - 32 U/L    Alkaline Phosphatase 25 (L) 39 - 117 U/L    Total Bilirubin 0.4 0.0 - 1.2 mg/dL    eGFR Non African Amer 87 >60 mL/min/1.73    Globulin 1.9 gm/dL    A/G Ratio 1.8 g/dL    BUN/Creatinine Ratio 10.6 7.0 - 25.0    Anion Gap 9.0 5.0 - 15.0 mmol/L   CBC Auto Differential    Collection Time: 07/05/21  2:17 AM    Specimen: Blood   Result Value Ref Range    WBC 6.80 3.40 - 10.80 10*3/mm3    RBC 3.10 (L) 3.77 - 5.28 10*6/mm3    Hemoglobin 9.4 (L) 12.0 - 15.9 g/dL    Hematocrit 27.3 (L) 34.0 - 46.6 %    MCV 88.0 79.0 - 97.0 fL    MCH 30.5 26.6 - 33.0 pg    MCHC 34.6 31.5 - 35.7 g/dL    RDW 14.2 12.3 - 15.4 %    RDW-SD 43.3 37.0 - 54.0 fl    MPV 8.6 6.0 - 12.0 fL    Platelets 257 140 - 450 10*3/mm3    Neutrophil % 49.3 42.7 - 76.0 %    Lymphocyte % 25.3 19.6 - 45.3 %    Monocyte % 13.4 (H) 5.0 - 12.0 %    Eosinophil % 10.9 (H) 0.3 - 6.2 %    Basophil % 1.1 0.0 - 1.5 %    Neutrophils, Absolute 3.30 1.70 - 7.00 10*3/mm3    Lymphocytes, Absolute 1.70 0.70 - 3.10 10*3/mm3    Monocytes, Absolute 0.90 0.10 - 0.90 10*3/mm3    Eosinophils, Absolute 0.70 (H) 0.00 - 0.40 10*3/mm3    Basophils, Absolute  0.10 0.00 - 0.20 10*3/mm3    nRBC 0.0 0.0 - 0.2 /100 WBC        Imaging:  XR Chest 1 View  Narrative: XR CHEST 1 VW-     Date of Exam: 7/2/2021 11:45 AM     Indication: r/o pna; 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     Comparison:  Chest x-ray dated June 18, 2021     Technique: Single view of the chest was obtained.     FINDINGS: The heart is slightly enlarged. There are increased congestive  changes seen about these lungs with Central congestive changes also  noted. No large effusion is noted. No focal consolidation is noted.     Impression:    1. Increased congestive changes have developed throughout the lungs and  central hilar areas. No large effusion is noted.        Electronically Signed By-Steven Mayers MD On:7/2/2021 12:49 PM  This report was finalized on [id number] by  Steven Mayers MD.       ASSESSMENT:  Dyspnea with hypoxia  Asthma  COPD with chronic bronchitis  Subclavian steal syndrome of left subclavian artery  Bilateral carotid artery stenosis  Hyponatremia  Normocytic anemia   Pulmonary edema per CXR    PLAN:  Antibiotics  Bronchodilator  Inhaled corticosteroids  Diet as tolerated  Diuretics daily   Electrolytes/ glycemic control  DVT and GI prophylaxis    Discussed with Dr Trell Diaz, RICKY   7/5/2021  09:32 EDT     I personally have examined  and interviewed the patient. I have reviewed the history, data, problems, assessment and plan with our NP.  Critical care time in direct medical management (   ) minutes  Electronically signed by William Cai MD, D,ABS, 07/05/21, 11:52 PM EDT.

## 2021-07-05 NOTE — CASE MANAGEMENT/SOCIAL WORK
Case Management Discharge Note      Final Note: Home with [facility]    Provided Post Acute Provider List?: Yes  Post Acute Provider List: Home Health  Delivered To: Patient  Method of Delivery: In person    Selected Continued Care - Discharged on 7/5/2021 Admission date: 6/30/2021 - Discharge disposition: Home or Self Care        Home Medical Care Coordination complete    Service Provider Selected Services Address Phone Fax Patient Preferred    [facility] [address] [place] [address], [place] [phone] -- --                    Final Discharge Disposition Code: 06 - home with home health care

## 2021-07-05 NOTE — PLAN OF CARE
Goal Outcome Evaluation:              Outcome Summary: Pt underwent subclavian artery bypass on 6/30.  She demos low activity tolerance and becomes slighlty SOA with mobility to and from bathroom.  O2 sats 87% after mobility to bathroom.  BP also elevated after upright activity.  O2 sats recovered within 2 mins.  Anticipate she will be safe to return home and will require HH therapy at discharge.

## 2021-07-05 NOTE — PLAN OF CARE
Pt resting. C/o treated per MAR. Pt had one episode of HTN during this shift. Tx with PRN medication successful outcome. Pt ambulates well in room with stand by assistance. Pt will need 6 min walk in order to d/c. Will continue to monitor.

## 2021-07-05 NOTE — PROGRESS NOTES
Exercise Oximetry    Patient Name:Gali Dover   MRN: [id number]   Date: 07/05/21             ROOM AIR BASELINE   SpO2% 91% on RA sitting up at bedside   Heart Rate    Blood Pressure      EXERCISE ON ROOM AIR SpO2% EXERCISE ON O2 @  LPM SpO2%   1 MINUTE  91 1 MINUTE    2 MINUTES  90 2 MINUTES    3 MINUTES  89 3 MINUTES    4 MINUTES  90 4 MINUTES    5 MINUTES  90 5 MINUTES    6 MINUTES  91 6 MINUTES               Distance Walked   Distance Walked   Dyspnea (Ethel Scale)   Dyspnea (Ethel Scale)   Fatigue (Ethel Scale)   Fatigue (Ethel Scale)   SpO2% Post Exercise   SpO2% Post Exercise   HR Post Exercise   HR Post Exercise   Time to Recovery   Time to Recovery     Comments: Pt. Walked in place in room at bedside but at about minute 3 she complained that this was hurting her legs and so she just stood for the remainder

## 2021-07-05 NOTE — THERAPY TREATMENT NOTE
Subjective: Pt agreeable to therapeutic plan of care. Pt eager and ready for discharge; agreeable to quick PT session before she leaves.    Objective:     Bed mobility - N/A or Not attempted.; sitting EOB upon entry and exit  Transfers - Supervision with RW STS  Ambulation - 40 feet Supervision with RW STS    /62 mmHg taken by RN prior to entry    Pain: 0 VAS  Education: Provided education on importance of mobility and skilled verbal / tactile cueing throughout intervention.     Assessment: Gali Dover presents with functional mobility impairments which indicate the need for skilled intervention. Pt sitting EOB upon entry; supervision with RW for transfers and gait. Pt did not appear to be SOA after ambulation; per RT, pt does not require O2 upon discharge. Pt exercise not increased this date due to her being eager to discharge. Pt is clear to discharge from PT standpoint; at baseline. No further PT needs while IP. Will complete orders.    Plan/Recommendations:   Pt would benefit from Home with Home Health at discharge from facility and requires rolling walker at discharge.   Pt desires Home with Home Health at discharge. Pt cooperative; agreeable to therapeutic recommendations and plan of care.     Basic Mobility 6-click:  Rollin = Total, A lot = 2, A little = 3; 4 = None  Supine>Sit:   1 = Total, A lot = 2, A little = 3; 4 = None   Sit>Stand with arms:  1 = Total, A lot = 2, A little = 3; 4 = None  Bed>Chair:   1 = Total, A lot = 2, A little = 3; 4 = None  Ambulate in room:  1 = Total, A lot = 2, A little = 3; 4 = None  3-5 Steps with railin = Total, A lot = 2, A little = 3; 4 = None  Score: 18    Post-Tx Position: Call light and personal items within reach, pt sitting EOB    PPE: gloves, surgical mask, eyewear protection

## 2021-07-05 NOTE — THERAPY EVALUATION
Patient Name: Gali Dover  : 1945    MRN: [id number]                              Today's Date: 2021       Admit Date: 2021    Visit Dx:     ICD-10-CM ICD-9-CM   1. Subclavian steal syndrome of left subclavian artery  G45.8 435.2   2. 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)   • Dizziness on standing   • Pain in right knee   • Primary osteoarthritis of right knee   • Subclavian steal syndrome of left subclavian artery   • Transaminitis   • Overweight (BMI 25.0-29.9)   • Normocytic anemia due to blood loss     Past Medical History:   Diagnosis Date   • Anxiety    • Arteritis (CMS/HCC)    • Asthma    • Constipation     off and on   • COPD (chronic obstructive pulmonary disease) (CMS/HCC)    • Disease of thyroid gland    • Disorder of left eye    • Dry eyes    • GERD (gastroesophageal reflux disease)    • Hyperlipidemia    • Hypertension    • 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    • Neuropathy    • Stricture of artery (CMS/HCC) 2021     Past Surgical History:   Procedure Laterality Date   • CAROTID SUBCLAVIAN BYPASS Left 2021    Procedure: CAROTID SUBCLAVIAN BYPASS;  Surgeon: Navid Hassan MD;  Location: [facility];  Service: Vascular;  Laterality: Left;   •  SECTION     • EYE SURGERY      cat ext   • HARDWARE REMOVAL Right     knee   • HYSTERECTOMY      still has ovaries   • KIDNEY SURGERY      stent placed    • KNEE ARTHROSCOPY Right    • LAPAROSCOPIC CHOLECYSTECTOMY       General Information     Row  Name 07/05/21 1438          OT Time and Intention    Document Type  evaluation  -SR     Mode of Treatment  occupational therapy  -SR     Row Name 07/05/21 1438          Occupational Profile    Reason for Services/Referral (Occupational Profile)  Pt underwent subclavian artery bypass on 6/30.  -SR     Successful Occupations (Occupational Profile)  Pt lives alone and is typically completely indepenent.  Has a shower chair.  -SR     Row Name 07/05/21 1438          Cognition    Orientation Status (Cognition)  oriented x 4  -SR     Row Name 07/05/21 1438          Safety Issues, Functional Mobility    Impairments Affecting Function (Mobility)  shortness of breath;strength;balance;endurance/activity tolerance;sensation/sensory awareness  -SR       User Key  (r) = Recorded By, (t) = Taken By, (c) = Cosigned By    Initials Name Provider Type    SR Jess Lewis OT Occupational Therapist          Mobility/ADL's     Row Name 07/05/21 1442          Bed Mobility    Bed Mobility  supine-sit  -SR     Supine-Sit Recluse (Bed Mobility)  supervision  -SR     Row Name 07/05/21 1442          Transfers    Sit-Stand Recluse (Transfers)  contact guard  -SR     Row Name 07/05/21 1442          Functional Mobility    Functional Mobility- Ind. Level  supervision required  -SR     Functional Mobility- Device  rolling walker  -SR     Row Name 07/05/21 1442          Activities of Daily Living    BADL Assessment/Intervention  lower body dressing;toileting  -SR     Row Name 07/05/21 1442          Lower Body Dressing Assessment/Training    Recluse Level (Lower Body Dressing)  shoes/slippers;supervision  -SR     Row Name 07/05/21 1442          Toileting Assessment/Training    Recluse Level (Toileting)  toileting skills;modified independence  -SR       User Key  (r) = Recorded By, (t) = Taken By, (c) = Cosigned By    Initials Name Provider Type    SR Jess Lewis OT Occupational Therapist         Obj/Interventions     [facility] Name 07/05/21 1443          Range of Motion Comprehensive    General Range of Motion  no range of motion deficits identified  -SR     [facility] Name 07/05/21 1443          Strength Comprehensive (MMT)    Comment, General Manual Muscle Testing (MMT) Assessment  L shoulder not tested due to incision.  Good strength throughout.  -SR     [facility] Name 07/05/21 1443          Balance    Balance Assessment  standing dynamic balance  -SR     Static Sitting Balance  WNL  -SR     Static Standing Balance  WFL  -SR     Dynamic Standing Balance  supported;WFL  -SR       User Key  (r) = Recorded By, (t) = Taken By, (c) = Cosigned By    Initials Name Provider Type    SR Jess Lewis, OT Occupational Therapist        Goals/Plan     [facility] Name 07/05/21 1451          Bathing Goal 1 (OT)    Activity/Device (Bathing Goal 1, OT)  bathing skills, all  -SR     [place] Level/Cues Needed (Bathing Goal 1, OT)  modified independence  -SR     Time Frame (Bathing Goal 1, OT)  2 weeks  -[facility] [place] Name 07/05/21 1451          Therapy Assessment/Plan (OT)    Planned Therapy Interventions (OT)  activity tolerance training;functional balance retraining;occupation/activity based interventions;BADL retraining  -SR       User Key  (r) = Recorded By, (t) = Taken By, (c) = Cosigned By    Initials Name Provider Type    SR Jess Lewis, OT Occupational Therapist        Clinical Impression     [facility] Name 07/05/21 1443          Pain Scale: FACES Pre/Post-Treatment    Pain: FACES Scale, Pretreatment  2-->hurts little bit  -SR     Posttreatment Pain Rating  2-->hurts little bit  -SR     Row Name 07/05/21 1440          Plan of Care Review    Outcome Summary  Pt underwent subclavian artery bypass on 6/30.  She demos low activity tolerance and becomes slighlty SOA with mobility to and from bathroom.  O2 sats 87% after mobility to bathroom.  BP also elevated after upright activity.  O2 sats recovered within 2 mins.   Anticipate she will be safe to return home and will require HH therapy at discharge.  -SR     Row Name 07/05/21 1445          Therapy Assessment/Plan (OT)    Rehab Potential (OT)  good, to achieve stated therapy goals  -SR     Criteria for Skilled Therapeutic Interventions Met (OT)  yes  -SR     Therapy Frequency (OT)  3 times/wk  -SR     Predicted Duration of Therapy Intervention (OT)  Until discharge  -SR     Row Name 07/05/21 1445          Therapy Plan Review/Discharge Plan (OT)    Anticipated Discharge Disposition (OT)  home with home health  -SR     Row Name 07/05/21 1445          Positioning and Restraints    Pre-Treatment Position  in bed  -SR     Post Treatment Position  bed  -SR     In Bed  exit alarm on;encouraged to call for assist;call light within reach  -SR       User Key  (r) = Recorded By, (t) = Taken By, (c) = Cosigned By    Initials Name Provider Type    Jess Mcintosh OT Occupational Therapist        Outcome Measures     Row Name 07/05/21 1452          How much help from another is currently needed...    Putting on and taking off regular lower body clothing?  4  -SR     Bathing (including washing, rinsing, and drying)  4  -SR     Toileting (which includes using toilet bed pan or urinal)  4  -SR     Putting on and taking off regular upper body clothing  4  -SR     Taking care of personal grooming (such as brushing teeth)  4  -SR     Eating meals  4  -SR     AM-PAC 6 Clicks Score (OT)  24  -SR     Row Name 07/05/21 1452          Functional Assessment    Outcome Measure Options  AM-PAC 6 Clicks Daily Activity (OT)  -SR       User Key  (r) = Recorded By, (t) = Taken By, (c) = Cosigned By    Initials Name Provider Type    Jess Mcintosh OT Occupational Therapist        Occupational Therapy Education                 Title: PT OT SLP Therapies (In Progress)     Topic: Occupational Therapy (In Progress)     Point: ADL training (In Progress)     Description:   Instruct learner(s) on  proper safety adaptation and remediation techniques during self care or transfers.   Instruct in proper use of assistive devices.              Learning Progress Summary           Patient Acceptance, E,TB, NR by SR at 7/5/2021 1452                               User Key     Initials Effective Dates Name Provider Type Discipline     06/16/21 -  Jess Lewis OT Occupational Therapist OT              OT Recommendation and Plan  Planned Therapy Interventions (OT): activity tolerance training, functional balance retraining, occupation/activity based interventions, BADL retraining  Therapy Frequency (OT): 3 times/wk  Plan of Care Review  Outcome Summary: Pt underwent subclavian artery bypass on 6/30.  She demos low activity tolerance and becomes slighlty SOA with mobility to and from bathroom.  O2 sats 87% after mobility to bathroom.  BP also elevated after upright activity.  O2 sats recovered within 2 mins.  Anticipate she will be safe to return home and will require HH therapy at discharge.     Time Calculation:   Time Calculation- OT     Row Name 07/05/21 1453             Time Calculation- OT    OT Start Time  1015  -SR      OT Stop Time  1038  -SR      OT Time Calculation (min)  23 min  -SR      Total Timed Code Minutes- OT  0 minute(s)  -SR      OT Non-Billable Time (min)  23 min  -SR      OT Received On  07/05/21  -SR      OT - Next Appointment  07/07/21  -SR      OT Goal Re-Cert Due Date  07/19/21  -        User Key  (r) = Recorded By, (t) = Taken By, (c) = Cosigned By    Initials Name Provider Type    SR Jess Lewis OT Occupational Therapist        Therapy Charges for Today     Code Description Service Date Service Provider Modifiers Qty    [id number]  OT EVAL MOD COMPLEXITY 4 7/5/2021 Jess Lewis OT GO 1               Jess Lewis OT  7/5/2021

## 2021-07-05 NOTE — CASE MANAGEMENT/SOCIAL WORK
Continued Stay Note  SANDRA Christensen     Patient Name: Gali Dover  MRN: [id number]  Today's Date: 7/5/2021    Admit Date: 6/30/2021    Discharge Plan     Row Name 07/05/21 1328       Plan    Plan  D/C Plan: Home with [facility] (accepted, order placed). RW from [facility] (order placed).    Patient/Family in Agreement with Plan  yes    Plan Comments   spoke to patient at bedside wearing mask and goggles and keeping distance greater than 6 feet and spent less than 15 minutes in room. Patient declines rehab and wants to return home with home health. Per new 6 minute walk, patient does not qualify for home oxygen. Home oxygen tank returned to [facility] closet. Patient denies any d/c needs at this time.          Expected Discharge Date and Time     Expected Discharge Date Expected Discharge Time    Jul 5, 2021             Danisha Mccarty

## 2021-07-05 NOTE — DISCHARGE SUMMARY
[facility] Medicine Services  DISCHARGE SUMMARY        Prepared For PCP:  Chris Pedro MD    Patient Name: Gali Dover  : 1945  MRN: [id number]      Date of Admission:   2021    Date of Discharge:  2021    Length of stay:  LOS: 5 days     Hospital Course     Presenting Problem:   Subclavian artery stenosis (CMS/HCC) [I77.1]  Stenosis of carotid artery [I65.29]      Active Hospital Problems    Diagnosis  POA   • COPD exacerbation (CMS/HCC) [J44.1]  Yes     Priority: High   • Overweight (BMI 25.0-29.9) [E66.3]  Yes     Priority: Medium   • Normocytic anemia due to blood loss [D50.0]  No     Priority: Medium   • Bilateral carotid artery stenosis [I65.23]  Yes     Priority: Medium   • Asthma [J45.909]  Yes     Priority: Medium   • Depression [F32.9]  Yes     Priority: Medium   • Hyperlipidemia [E78.5]  Yes     Priority: Medium   • Polymyalgia  [M35.3]  Yes     Priority: Medium   • Hypothyroid [E03.9]  Yes     Priority: Medium   • Peripheral neuropathy [G62.9]  Yes     Priority: Medium   • COPD (chronic obstructive pulmonary disease) with chronic bronchitis (CMS/HCC) [J44.9]  Yes     Priority: Medium   • Anxiety [F41.9]  Yes     Priority: Medium   • GERD (gastroesophageal reflux disease) [K21.9]  Yes     Priority: Medium   • Essential hypertension [I10]  Yes     Priority: Medium      Resolved Hospital Problems    Diagnosis Date Resolved POA   • **Subclavian steal syndrome of left subclavian artery [G45.8] 2021 Yes     Priority: High   • Transaminitis [R74.01] 2021 No     Priority: Medium           Hospital Course:    The patient was observed overnight in the ICU and monitored by intensivist.  The patient was treated for COPD exacerbation.    The hospitalist service was consulted for medical management on 2021.    The patient will be discharged home on 2021.  Home oxygen eval was done before discharge home.        Recommendation for Outpatient Providers:              Reasons For Change In Medications and Indications for New Medications:        Day of Discharge     HPI:     The patient is a 75-year-old female with history of left subclavian stenosis that extends all the way to the vertebral artery not amenable to stenting that has been having dizziness and left hand numbness.  The patient has symptomatic subclavian steal syndrome.  She underwent left common carotid artery to subclavian artery bypass with 6 mm PTFE ring supported graft on 6/30/2021.        Vital Signs:   Temp:  [98.3 °F (36.8 °C)-98.4 °F (36.9 °C)] 98.3 °F (36.8 °C)  Heart Rate:  [56-80] 61  Resp:  [14-20] 18  BP: (129-188)/(43-74) 147/62     Physical Exam:  Physical Exam  HENT:      Head: Normocephalic.        Nose: Nose normal.   Eyes:      Extraocular Movements: Extraocular movements intact.      Pupils: Pupils are equal, round, and reactive to light.   Cardiovascular:      Rate and Rhythm: Normal rate and regular rhythm.   Pulmonary:      Effort: Pulmonary effort is normal.      Breath sounds: Normal breath sounds.   Abdominal:      General: Bowel sounds are normal.      Palpations: Abdomen is soft.      Tenderness: There is no abdominal tenderness.   Musculoskeletal:      Comments: Moves all extremities equally   Lymphadenopathy:      Cervical: No cervical adenopathy.   Skin:     General: Skin is warm.      Findings: No rash.   Neurological:      Mental Status: She is alert and oriented to person, place, and time.      Cranial Nerves: Cranial nerves are intact.   Psychiatric:         Mood and Affect: Mood normal.         Speech: Speech normal.         Behavior: Behavior normal.         Cognition and Memory: Cognition normal.         Pertinent  and/or Most Recent Results     Results from last 7 days   Lab Units 07/05/21  0217 07/04/21  0212 07/03/21  0329 07/02/21  0403 07/01/21  0550 06/30/21  1542   WBC 10*3/mm3 6.80 6.40 7.00 7.40 8.90 10.10   HEMOGLOBIN g/dL 9.4* 9.2* 9.2* 9.8* 9.1* 10.1*    HEMATOCRIT % 27.3* 26.4* 26.6* 28.6* 26.5* 29.7*   PLATELETS 10*3/mm3 257 236 230 234 243 262   SODIUM mmol/L 130* 128* 127* 124* 130* 134*   POTASSIUM mmol/L 4.2 4.3 3.5 3.9 4.1 4.4   CHLORIDE mmol/L 96* 93* 91* 91* 96* 99   CO2 mmol/L 25.0 27.0 27.0 23.0 23.0 23.0   BUN mg/dL 7* 7* 7* 8 8 9   CREATININE mg/dL 0.66 0.66 0.65 0.60 0.69 0.73   GLUCOSE mg/dL 104* 98 114* 102* 102* 168*   CALCIUM mg/dL 8.4* 8.2* 7.9* 8.4* 8.0* 8.5*     Results from last 7 days   Lab Units 07/05/21  0217 07/04/21  0212 07/03/21  0329 07/02/21  0403 07/01/21  0550 06/30/21  1542   BILIRUBIN mg/dL 0.4 0.3 0.4 0.4 0.7 0.4   ALK PHOS U/L 25* 27* 31* 34* 34* 29*   ALT (SGPT) U/L 61* 70* 87* 128* 167* 93*   AST (SGOT) U/L 39* 45* 63* 114* 278* 148*     Results from last 7 days   Lab Units 07/01/21  0550   CHOLESTEROL mg/dL 150   TRIGLYCERIDES mg/dL 131   HDL CHOL mg/dL 34*     Results from last 7 days   Lab Units 07/03/21  0329   PROBNP pg/mL 586.7       Brief Urine Lab Results     None          Microbiology Results Abnormal     Procedure Component Value - Date/Time    Respiratory Culture - Sputum, Cough [[id number]] Collected: 07/03/21 0016    Lab Status: Edited Result - FINAL Specimen: Sputum from Cough Updated: 07/05/21 0940     Respiratory Culture Scant growth (1+) Normal Respiratory Vtia: NO S.aureus/MRSA or Pseudomonas aeruginosa     Gram Stain Many (4+) WBCs per low power field      Rare (1+) Epithelial cells per low power field      Moderate (3+) Mixed bacterial morphotypes seen on Gram Stain          XR Chest 1 View    Result Date: 7/2/2021  Impression:  1. Increased congestive changes have developed throughout the lungs and central hilar areas. No large effusion is noted.   Electronically Signed By-Steven Mayers MD On:7/2/2021 12:49 PM This report was finalized on [id number] by  Steven Mayers MD.    XR Chest PA & Lateral    Result Date: 6/18/2021  Impression: No acute chest findings.  Electronically Signed By-Jess  MD Yves On:6/18/2021 1:26 PM This report was finalized on [id number] by  Jess Marinelli MD.      Results for orders placed during the hospital encounter of 06/01/21    Duplex carotid ultrasound CAR    Interpretation Summary  · Proximal right internal carotid artery moderate stenosis.  · Proximal left internal carotid artery moderate stenosis.  · Left Vertebral: Retrograde flow noted.      Results for orders placed during the hospital encounter of 06/01/21    Duplex carotid ultrasound CAR    Interpretation Summary  · Proximal right internal carotid artery moderate stenosis.  · Proximal left internal carotid artery moderate stenosis.  · Left Vertebral: Retrograde flow noted.                  Test Results Pending at Discharge        Procedures Performed  Procedure(s):  CAROTID SUBCLAVIAN BYPASS         Consults:   Consults     Date and Time Order Name Status Description    7/3/2021  3:53 PM Inpatient Physical Medicine Rehab Consult      7/1/2021 11:21 AM Inpatient Hospitalist Consult Completed     6/30/2021 11:28 AM Inpatient Intensivist Consult Completed             Discharge Details        Discharge Medications      New Medications      Instructions Start Date   doxycycline 100 MG tablet  Commonly known as: ADOXA   100 mg, Oral, Every 12 Hours Scheduled      guaiFENesin 600 MG 12 hr tablet  Commonly known as: MUCINEX   600 mg, Oral, Every 12 Hours Scheduled      HYDROcodone-acetaminophen 5-325 MG per tablet  Commonly known as: NORCO   1 tablet, Oral, Every 4 Hours PRN      HYDROcodone-acetaminophen 5-325 MG per tablet  Commonly known as: Norco   1 tablet, Oral, Every 4 Hours PRN      ondansetron 4 MG tablet  Commonly known as: Zofran   4 mg, Oral, Every 6 Hours PRN         Continue These Medications      Instructions Start Date   Actemra 162 MG/0.9ML solution prefilled syringe  Generic drug: Tocilizumab   1 syringe, Subcutaneous, Weekly, Monday       albuterol sulfate  (90 Base) MCG/ACT  inhaler  Commonly known as: PROVENTIL HFA;VENTOLIN HFA;PROAIR HFA   2 puffs, Inhalation, Every 4 Hours PRN, Use or bring dos       aspirin 325 MG tablet   325 mg, Oral, Daily, Was told ok day before surgery, but not to take dos per garima at Dr. Hassan's office      azelastine 0.1 % nasal spray  Commonly known as: ASTELIN   1 spray, Nasal, 2 Times Daily, Use in each nostril as directed       budesonide-formoterol 160-4.5 MCG/ACT inhaler  Commonly known as: SYMBICORT   2 puffs, Inhalation, 2 Times Daily - RT, Use and bring dos       calcium carbonate 600 MG tablet  Commonly known as: OS-VANDANA   600 mg, Oral, Daily, Stop 6-23       cycloSPORINE 0.05 % ophthalmic emulsion  Commonly known as: RESTASIS   1 drop, 2 Times Daily, Use dos       digoxin 250 MCG tablet  Commonly known as: LANOXIN   250 mcg, Oral, Every Night at Bedtime      escitalopram 10 MG tablet  Commonly known as: LEXAPRO   10 mg, Oral, Every Morning, Take dos       fluconazole 200 MG tablet  Commonly known as: DIFLUCAN   200 mg, Oral, Every Night at Bedtime      fluticasone 50 MCG/ACT nasal spray  Commonly known as: FLONASE   2 sprays, Nasal, Every Morning      furosemide 20 MG tablet  Commonly known as: LASIX   10 mg, Oral, Every Morning, Do not take dos       gabapentin 300 MG capsule  Commonly known as: NEURONTIN   300 mg, Oral, 3 Times Daily      hydrALAZINE 100 MG tablet  Commonly known as: APRESOLINE   100 mg, Oral, 3 Times Daily, Take dos       immune globulin (human) 1 GM/10ML solution infusion  Commonly known as: GAMMAGARD   Intravenous, Once      levothyroxine 25 MCG tablet  Commonly known as: SYNTHROID, LEVOTHROID   25 mcg, Oral, Every Morning, Take dos       losartan 100 MG tablet  Commonly known as: COZAAR   100 mg, Oral, Every Morning, LD 6-29-21 before 0800       loteprednol 0.5 % ophthalmic suspension  Commonly known as: LOTEMAX   1 drop, Left Eye, Every Night at Bedtime      metoprolol succinate  MG 24 hr tablet  Commonly known as:  TOPROL-XL   100 mg, Oral, Every Morning, Take dos       montelukast 10 MG tablet  Commonly known as: SINGULAIR   10 mg, Oral, Nightly      NIFEdipine XL 60 MG 24 hr tablet  Commonly known as: PROCARDIA XL   60 mg, Oral, 2 times daily, Take dos       PrilOSEC 20 MG capsule  Generic drug: omeprazole   20 mg, Oral, Every Morning, Take dos       PROBIOTIC PO   Oral      simvastatin 40 MG tablet  Commonly known as: ZOCOR   40 mg, Oral, Nightly      tiotropium 18 MCG per inhalation capsule  Commonly known as: SPIRIVA   1 capsule, Inhalation, Every Morning, Use and bring dos       Vitamin D 50 MCG (2000 UT) capsule   Oral, Stop 6-23       Zetia 10 MG tablet  Generic drug: ezetimibe   10 mg, Oral, Every Night at Bedtime             Allergies   Allergen Reactions   • Crestor [Rosuvastatin] Myalgia   • Lipitor [Atorvastatin] Myalgia   • Penicillins Hives   • Hctz [Hydrochlorothiazide] Rash         Discharge Disposition:  Home or Self Care    Diet:  Hospital:  Diet Order   Procedures   • Diet Cardiac; Healthy Heart         Discharge Activity:   Activity Instructions     As tolerated              CODE STATUS:    Code Status and Medical Interventions:   Ordered at: 06/30/21 1130     Code Status:    CPR     Medical Interventions (Level of Support Prior to Arrest):    Full         Follow-up Appointments  No future appointments.    Additional Instructions for the Follow-ups that You Need to Schedule     Ambulatory Referral to Home Health   As directed      Face to Face Visit Date: 7/2/2021    Follow-up provider for Plan of Care?: I treated the patient in an acute care facility and will not continue treatment after discharge.    Follow-up provider: JACOB HALEY [[id number]]    Reason/Clinical Findings: COPD    Describe mobility limitations that make leaving home difficult: COPD    Nursing/Therapeutic Services Requested: Other (eval and treat-nursing/PT )    Frequency: 1 Week 1         Discharge Follow-up with PCP   As directed        Currently Documented PCP:    Chris Pedro MD    PCP Phone Number:    [phone]     Follow Up Details: 2 weeks                 Condition on Discharge:      Stable      This patient has been examined wearing appropriate Personal Protective Equipment and discussed with nursing. 07/05/21      Electronically signed by Stefano Hubbard DO, 07/05/21, 2:44 PM EDT.      Time: I spent  20  minutes on this discharge activity which included face-to-face encounter with the patient/reviewing the data in the system/coordination of the care with the nursing staff as well as consultants/documentation/entering orders.

## 2021-07-05 NOTE — PLAN OF CARE
Assessment: Gali Dover presents with functional mobility impairments which indicate the need for skilled intervention. Pt sitting EOB upon entry; supervision with RW for transfers and gait. Pt did not appear to be SOA after ambulation; per RT, pt does not require O2 upon discharge. Pt exercise not increased this date due to her being eager to discharge. Pt is clear to discharge from PT standpoint; at baseline. No further PT needs while IP. Will complete orders.

## 2021-07-06 ENCOUNTER — READMISSION MANAGEMENT (OUTPATIENT)
Dept: CALL CENTER | Facility: HOSPITAL | Age: 76
End: 2021-07-06

## 2021-07-06 NOTE — OUTREACH NOTE
Prep Survey      Responses   [facility] facility patient discharged from?  [facility]   Is LACE score < 7 ?  No   Emergency Room discharge w/ pulse ox?  No   Eligibility  Readm Mgmt   Discharge diagnosis  Subclavian steal syndrome of left subclavian artery:CAROTID SUBCLAVIAN BYPASS   Does the patient have one of the following disease processes/diagnoses(primary or secondary)?  General Surgery   Does the patient have Home health ordered?  Yes   What is the Home health agency?   [facility]   Comments regarding appointments  RW and home oxygen from [place]   Prep survey completed?  Yes          Miriam Santana RN

## 2021-07-07 ENCOUNTER — TRANSCRIBE ORDERS (OUTPATIENT)
Dept: ADMINISTRATIVE | Facility: HOSPITAL | Age: 76
End: 2021-07-07

## 2021-07-07 DIAGNOSIS — I65.23 BILATERAL CAROTID ARTERY OCCLUSION: Primary | ICD-10-CM

## 2021-07-09 ENCOUNTER — READMISSION MANAGEMENT (OUTPATIENT)
Dept: CALL CENTER | Facility: HOSPITAL | Age: 76
End: 2021-07-09

## 2021-07-09 NOTE — OUTREACH NOTE
General Surgery Week 1 Survey      Responses   [facility] patient discharged from?  [facility]   Does the patient have one of the following disease processes/diagnoses(primary or secondary)?  General Surgery   Week 1 attempt successful?  Yes   Call start time  1124   Call end time  1127   Discharge diagnosis  Subclavian steal syndrome of left subclavian artery:CAROTID SUBCLAVIAN BYPASS   Meds reviewed with patient/caregiver?  Yes   Is the patient having any side effects they believe may be caused by any medication additions or changes?  No   Does the patient have all medications related to this admission filled (includes all antibiotics, pain medications, etc.)  Yes   Is the patient taking all medications as directed (includes completed medication regime)?  Yes   Does the patient have a follow up appointment scheduled with their surgeon?  Yes   Has the patient kept scheduled appointments due by today?  Yes   What is the Home health agency?   [facility]   Has home health visited the patient within 72 hours of discharge?  Yes   Psychosocial issues?  No   Did the patient receive a copy of their discharge instructions?  Yes   Nursing interventions  Reviewed instructions with patient   What is the patient's perception of their health status since discharge?  Improving   Nursing interventions  Nurse provided patient education   Is the patient /caregiver able to teach back basic post-op care?  Continue use of incentive spirometry at least 1 week post discharge, Practice 'cough and deep breath', Drive as instructed by MD in discharge instructions, Take showers only when approved by MD-sponge bathe until then, No tub bath, swimming, or hot tub until instructed by MD, Keep incision areas clean,dry and protected, Do not remove steri-strips, Lifting as instructed by MD in discharge instructions   Is the patient/caregiver able to teach back signs and symptoms of incisional infection?  Increased redness, swelling or  pain at the incisonal site, Increased drainage or bleeding, Incisional warmth, Pus or odor from incision, Fever   Is the patient/caregiver able to teach back steps to recovery at home?  Set small, achievable goals for return to baseline health, Eat a well-balance diet, Rest and rebuild strength, gradually increase activity, Make a list of questions for surgeon's appointment   If the patient is a current smoker, are they able to teach back resources for cessation?  Not a smoker   Is the patient/caregiver able to teach back the hierarchy of who to call/visit for symptoms/problems? PCP, Specialist, Home health nurse, Urgent Care, ED, 911  Yes   Additional teach back comments  She is doing a bit better daily. She is on the way to her PCP. Has no complaints at this time.   Week 1 call completed?  Yes   Wrap up additional comments  Improving.          Olga Castillo RN

## 2021-07-13 ENCOUNTER — OFFICE VISIT (OUTPATIENT)
Dept: VASCULAR SURGERY | Facility: HOSPITAL | Age: 76
End: 2021-07-13

## 2021-07-13 PROCEDURE — G0463 HOSPITAL OUTPT CLINIC VISIT: HCPCS

## 2021-07-17 ENCOUNTER — READMISSION MANAGEMENT (OUTPATIENT)
Dept: CALL CENTER | Facility: HOSPITAL | Age: 76
End: 2021-07-17

## 2021-07-17 NOTE — OUTREACH NOTE
General Surgery Week 2 Survey      Responses   [facility] patient discharged from?  [facility]   Does the patient have one of the following disease processes/diagnoses(primary or secondary)?  General Surgery   Week 2 attempt successful?  Yes   Call start time  1418   Call end time  1420   Meds reviewed with patient/caregiver?  Yes   Is the patient taking all medications as directed (includes completed medication regime)?  Yes   Has the patient kept scheduled appointments due by today?  Yes   Comments  HH comes, uses walker and still having vascular issue with rocael jennings, will have tests run further, foot goes numb,vascular is aware   What is the patient's perception of their health status since discharge?  Same   Week 2 call completed?  Yes   Wrap up additional comments  She says neck is doing well, it is the leg issue making her feel worse, she says leg is hurting mio Murphy RN

## 2021-07-18 ENCOUNTER — READMISSION MANAGEMENT (OUTPATIENT)
Dept: CALL CENTER | Facility: HOSPITAL | Age: 76
End: 2021-07-18

## 2021-07-18 ENCOUNTER — APPOINTMENT (OUTPATIENT)
Dept: CT IMAGING | Facility: HOSPITAL | Age: 76
End: 2021-07-18

## 2021-07-18 ENCOUNTER — APPOINTMENT (OUTPATIENT)
Dept: GENERAL RADIOLOGY | Facility: HOSPITAL | Age: 76
End: 2021-07-18

## 2021-07-18 ENCOUNTER — HOSPITAL ENCOUNTER (INPATIENT)
Facility: HOSPITAL | Age: 76
LOS: 3 days | Discharge: NURSING, REHAB OR LONG-TERM CARE | End: 2021-07-22
Attending: EMERGENCY MEDICINE | Admitting: INTERNAL MEDICINE

## 2021-07-18 DIAGNOSIS — R09.02 HYPOXIA: ICD-10-CM

## 2021-07-18 DIAGNOSIS — J18.9 PNEUMONIA DUE TO INFECTIOUS ORGANISM, UNSPECIFIED LATERALITY, UNSPECIFIED PART OF LUNG: Primary | ICD-10-CM

## 2021-07-18 DIAGNOSIS — R06.00 DYSPNEA, UNSPECIFIED TYPE: ICD-10-CM

## 2021-07-18 DIAGNOSIS — M17.11 PRIMARY OSTEOARTHRITIS OF RIGHT KNEE: ICD-10-CM

## 2021-07-18 PROBLEM — J96.01 ACUTE RESPIRATORY FAILURE WITH HYPOXIA (HCC): Status: ACTIVE | Noted: 2021-07-18

## 2021-07-18 LAB
ANION GAP SERPL CALCULATED.3IONS-SCNC: 12 MMOL/L (ref 5–15)
APTT PPP: 21.1 SECONDS (ref 24–31)
B PARAPERT DNA SPEC QL NAA+PROBE: NOT DETECTED
B PERT DNA SPEC QL NAA+PROBE: NOT DETECTED
BUN SERPL-MCNC: 8 MG/DL (ref 8–23)
BUN/CREAT SERPL: 13.3 (ref 7–25)
C PNEUM DNA NPH QL NAA+NON-PROBE: NOT DETECTED
CALCIUM SPEC-SCNC: 8.9 MG/DL (ref 8.6–10.5)
CHLORIDE SERPL-SCNC: 100 MMOL/L (ref 98–107)
CO2 SERPL-SCNC: 23 MMOL/L (ref 22–29)
CREAT SERPL-MCNC: 0.6 MG/DL (ref 0.57–1)
D DIMER PPP FEU-MCNC: 1.94 MG/L (FEU) (ref 0–0.59)
DEPRECATED RDW RBC AUTO: 44.2 FL (ref 37–54)
DIGOXIN SERPL-MCNC: 1.4 NG/ML (ref 0.6–1.2)
EOSINOPHIL # BLD MANUAL: 0.29 10*3/MM3 (ref 0–0.4)
EOSINOPHIL NFR BLD MANUAL: 4 % (ref 0.3–6.2)
ERYTHROCYTE [DISTWIDTH] IN BLOOD BY AUTOMATED COUNT: 14.6 % (ref 12.3–15.4)
FLUAV SUBTYP SPEC NAA+PROBE: NOT DETECTED
FLUBV RNA ISLT QL NAA+PROBE: NOT DETECTED
GFR SERPL CREATININE-BSD FRML MDRD: 97 ML/MIN/1.73
GIANT PLATELETS: ABNORMAL
GLUCOSE SERPL-MCNC: 101 MG/DL (ref 65–99)
HADV DNA SPEC NAA+PROBE: NOT DETECTED
HCOV 229E RNA SPEC QL NAA+PROBE: NOT DETECTED
HCOV HKU1 RNA SPEC QL NAA+PROBE: NOT DETECTED
HCOV NL63 RNA SPEC QL NAA+PROBE: NOT DETECTED
HCOV OC43 RNA SPEC QL NAA+PROBE: NOT DETECTED
HCT VFR BLD AUTO: 28.7 % (ref 34–46.6)
HGB BLD-MCNC: 9.5 G/DL (ref 12–15.9)
HMPV RNA NPH QL NAA+NON-PROBE: NOT DETECTED
HPIV1 RNA SPEC QL NAA+PROBE: NOT DETECTED
HPIV2 RNA SPEC QL NAA+PROBE: NOT DETECTED
HPIV3 RNA NPH QL NAA+PROBE: NOT DETECTED
HPIV4 P GENE NPH QL NAA+PROBE: NOT DETECTED
INR PPP: 0.98 (ref 0.93–1.1)
LARGE PLATELETS: ABNORMAL
LYMPHOCYTES # BLD MANUAL: 1.58 10*3/MM3 (ref 0.7–3.1)
LYMPHOCYTES NFR BLD MANUAL: 22 % (ref 19.6–45.3)
LYMPHOCYTES NFR BLD MANUAL: 6 % (ref 5–12)
M PNEUMO IGG SER IA-ACNC: NOT DETECTED
MCH RBC QN AUTO: 28.8 PG (ref 26.6–33)
MCHC RBC AUTO-ENTMCNC: 33.2 G/DL (ref 31.5–35.7)
MCV RBC AUTO: 86.9 FL (ref 79–97)
METAMYELOCYTES NFR BLD MANUAL: 2 % (ref 0–0)
MONOCYTES # BLD AUTO: 0.43 10*3/MM3 (ref 0.1–0.9)
NEUTROPHILS # BLD AUTO: 4.75 10*3/MM3 (ref 1.7–7)
NEUTROPHILS NFR BLD MANUAL: 65 % (ref 42.7–76)
NEUTS BAND NFR BLD MANUAL: 1 % (ref 0–5)
NT-PROBNP SERPL-MCNC: 644.1 PG/ML (ref 0–1800)
PLATELET # BLD AUTO: 410 10*3/MM3 (ref 140–450)
PMV BLD AUTO: 8.4 FL (ref 6–12)
POTASSIUM SERPL-SCNC: 4 MMOL/L (ref 3.5–5.2)
PROTHROMBIN TIME: 10.9 SECONDS (ref 9.6–11.7)
RBC # BLD AUTO: 3.3 10*6/MM3 (ref 3.77–5.28)
RBC MORPH BLD: NORMAL
RHINOVIRUS RNA SPEC NAA+PROBE: NOT DETECTED
RSV RNA NPH QL NAA+NON-PROBE: NOT DETECTED
SARS-COV-2 RNA NPH QL NAA+NON-PROBE: NOT DETECTED
SMALL PLATELETS BLD QL SMEAR: ADEQUATE
SODIUM SERPL-SCNC: 135 MMOL/L (ref 136–145)
TROPONIN T SERPL-MCNC: 0.02 NG/ML (ref 0–0.03)
WBC # BLD AUTO: 7.2 10*3/MM3 (ref 3.4–10.8)
WBC MORPH BLD: NORMAL

## 2021-07-18 PROCEDURE — 83880 ASSAY OF NATRIURETIC PEPTIDE: CPT | Performed by: EMERGENCY MEDICINE

## 2021-07-18 PROCEDURE — 25010000002 VANCOMYCIN 10 G RECONSTITUTED SOLUTION: Performed by: EMERGENCY MEDICINE

## 2021-07-18 PROCEDURE — 25010000002 KETOROLAC TROMETHAMINE PER 15 MG: Performed by: PHYSICIAN ASSISTANT

## 2021-07-18 PROCEDURE — 85730 THROMBOPLASTIN TIME PARTIAL: CPT | Performed by: EMERGENCY MEDICINE

## 2021-07-18 PROCEDURE — 80048 BASIC METABOLIC PNL TOTAL CA: CPT | Performed by: EMERGENCY MEDICINE

## 2021-07-18 PROCEDURE — 80162 ASSAY OF DIGOXIN TOTAL: CPT | Performed by: EMERGENCY MEDICINE

## 2021-07-18 PROCEDURE — 25010000002 FUROSEMIDE PER 20 MG: Performed by: PHYSICIAN ASSISTANT

## 2021-07-18 PROCEDURE — G0378 HOSPITAL OBSERVATION PER HR: HCPCS

## 2021-07-18 PROCEDURE — 25010000002 ENOXAPARIN PER 10 MG: Performed by: INTERNAL MEDICINE

## 2021-07-18 PROCEDURE — 85007 BL SMEAR W/DIFF WBC COUNT: CPT | Performed by: EMERGENCY MEDICINE

## 2021-07-18 PROCEDURE — 85379 FIBRIN DEGRADATION QUANT: CPT | Performed by: INTERNAL MEDICINE

## 2021-07-18 PROCEDURE — 94640 AIRWAY INHALATION TREATMENT: CPT

## 2021-07-18 PROCEDURE — 85025 COMPLETE CBC W/AUTO DIFF WBC: CPT | Performed by: EMERGENCY MEDICINE

## 2021-07-18 PROCEDURE — 63710000001 ONDANSETRON PER 8 MG: Performed by: INTERNAL MEDICINE

## 2021-07-18 PROCEDURE — 99223 1ST HOSP IP/OBS HIGH 75: CPT | Performed by: INTERNAL MEDICINE

## 2021-07-18 PROCEDURE — 25010000002 CEFEPIME PER 500 MG: Performed by: EMERGENCY MEDICINE

## 2021-07-18 PROCEDURE — 84484 ASSAY OF TROPONIN QUANT: CPT | Performed by: EMERGENCY MEDICINE

## 2021-07-18 PROCEDURE — 85610 PROTHROMBIN TIME: CPT | Performed by: EMERGENCY MEDICINE

## 2021-07-18 PROCEDURE — 71045 X-RAY EXAM CHEST 1 VIEW: CPT

## 2021-07-18 PROCEDURE — 0 IOPAMIDOL PER 1 ML: Performed by: INTERNAL MEDICINE

## 2021-07-18 PROCEDURE — 99284 EMERGENCY DEPT VISIT MOD MDM: CPT

## 2021-07-18 PROCEDURE — 71275 CT ANGIOGRAPHY CHEST: CPT

## 2021-07-18 PROCEDURE — 87040 BLOOD CULTURE FOR BACTERIA: CPT | Performed by: EMERGENCY MEDICINE

## 2021-07-18 PROCEDURE — 0202U NFCT DS 22 TRGT SARS-COV-2: CPT | Performed by: EMERGENCY MEDICINE

## 2021-07-18 RX ORDER — ASPIRIN 325 MG
325 TABLET ORAL DAILY
Status: DISCONTINUED | OUTPATIENT
Start: 2021-07-19 | End: 2021-07-23 | Stop reason: HOSPADM

## 2021-07-18 RX ORDER — AZELASTINE 1 MG/ML
1 SPRAY, METERED NASAL 2 TIMES DAILY
Status: DISCONTINUED | OUTPATIENT
Start: 2021-07-18 | End: 2021-07-23 | Stop reason: HOSPADM

## 2021-07-18 RX ORDER — ALBUTEROL SULFATE 2.5 MG/3ML
2.5 SOLUTION RESPIRATORY (INHALATION) EVERY 4 HOURS PRN
Status: DISCONTINUED | OUTPATIENT
Start: 2021-07-18 | End: 2021-07-23 | Stop reason: HOSPADM

## 2021-07-18 RX ORDER — CYCLOSPORINE 0.5 MG/ML
1 EMULSION OPHTHALMIC 2 TIMES DAILY
Status: DISCONTINUED | OUTPATIENT
Start: 2021-07-18 | End: 2021-07-23 | Stop reason: HOSPADM

## 2021-07-18 RX ORDER — HYDROCODONE BITARTRATE AND ACETAMINOPHEN 5; 325 MG/1; MG/1
1 TABLET ORAL EVERY 4 HOURS PRN
Status: DISCONTINUED | OUTPATIENT
Start: 2021-07-18 | End: 2021-07-18 | Stop reason: SDUPTHER

## 2021-07-18 RX ORDER — BISACODYL 5 MG/1
5 TABLET, DELAYED RELEASE ORAL DAILY PRN
Status: DISCONTINUED | OUTPATIENT
Start: 2021-07-18 | End: 2021-07-23 | Stop reason: HOSPADM

## 2021-07-18 RX ORDER — ESCITALOPRAM OXALATE 10 MG/1
10 TABLET ORAL DAILY
Status: DISCONTINUED | OUTPATIENT
Start: 2021-07-19 | End: 2021-07-23 | Stop reason: HOSPADM

## 2021-07-18 RX ORDER — OXYCODONE HYDROCHLORIDE 5 MG/1
5 TABLET ORAL EVERY 4 HOURS PRN
Status: DISCONTINUED | OUTPATIENT
Start: 2021-07-18 | End: 2021-07-23 | Stop reason: HOSPADM

## 2021-07-18 RX ORDER — LEVOTHYROXINE SODIUM 0.03 MG/1
25 TABLET ORAL
Status: DISCONTINUED | OUTPATIENT
Start: 2021-07-19 | End: 2021-07-23 | Stop reason: HOSPADM

## 2021-07-18 RX ORDER — NIFEDIPINE 30 MG/1
60 TABLET, EXTENDED RELEASE ORAL
Status: DISCONTINUED | OUTPATIENT
Start: 2021-07-19 | End: 2021-07-23 | Stop reason: HOSPADM

## 2021-07-18 RX ORDER — ATORVASTATIN CALCIUM 20 MG/1
20 TABLET, FILM COATED ORAL DAILY
Status: DISCONTINUED | OUTPATIENT
Start: 2021-07-19 | End: 2021-07-23 | Stop reason: HOSPADM

## 2021-07-18 RX ORDER — FUROSEMIDE 10 MG/ML
20 INJECTION INTRAMUSCULAR; INTRAVENOUS ONCE
Status: COMPLETED | OUTPATIENT
Start: 2021-07-18 | End: 2021-07-18

## 2021-07-18 RX ORDER — PREDNISOLONE ACETATE 10 MG/ML
1 SUSPENSION/ DROPS OPHTHALMIC NIGHTLY
Status: DISCONTINUED | OUTPATIENT
Start: 2021-07-19 | End: 2021-07-23 | Stop reason: HOSPADM

## 2021-07-18 RX ORDER — SODIUM CHLORIDE 0.9 % (FLUSH) 0.9 %
10 SYRINGE (ML) INJECTION AS NEEDED
Status: DISCONTINUED | OUTPATIENT
Start: 2021-07-18 | End: 2021-07-23 | Stop reason: HOSPADM

## 2021-07-18 RX ORDER — SACCHAROMYCES BOULARDII 250 MG
250 CAPSULE ORAL DAILY
Status: DISCONTINUED | OUTPATIENT
Start: 2021-07-19 | End: 2021-07-23 | Stop reason: HOSPADM

## 2021-07-18 RX ORDER — BUDESONIDE AND FORMOTEROL FUMARATE DIHYDRATE 160; 4.5 UG/1; UG/1
2 AEROSOL RESPIRATORY (INHALATION)
Status: DISCONTINUED | OUTPATIENT
Start: 2021-07-18 | End: 2021-07-19

## 2021-07-18 RX ORDER — GABAPENTIN 300 MG/1
300 CAPSULE ORAL 3 TIMES DAILY
Status: DISCONTINUED | OUTPATIENT
Start: 2021-07-18 | End: 2021-07-23 | Stop reason: HOSPADM

## 2021-07-18 RX ORDER — PANTOPRAZOLE SODIUM 40 MG/1
40 TABLET, DELAYED RELEASE ORAL
Status: DISCONTINUED | OUTPATIENT
Start: 2021-07-19 | End: 2021-07-23 | Stop reason: HOSPADM

## 2021-07-18 RX ORDER — AMOXICILLIN 250 MG
2 CAPSULE ORAL 2 TIMES DAILY
Status: DISCONTINUED | OUTPATIENT
Start: 2021-07-18 | End: 2021-07-23 | Stop reason: HOSPADM

## 2021-07-18 RX ORDER — SODIUM CHLORIDE 0.9 % (FLUSH) 0.9 %
10 SYRINGE (ML) INJECTION EVERY 12 HOURS SCHEDULED
Status: DISCONTINUED | OUTPATIENT
Start: 2021-07-18 | End: 2021-07-23 | Stop reason: HOSPADM

## 2021-07-18 RX ORDER — HYDRALAZINE HYDROCHLORIDE 25 MG/1
100 TABLET, FILM COATED ORAL 3 TIMES DAILY
Status: DISCONTINUED | OUTPATIENT
Start: 2021-07-18 | End: 2021-07-23 | Stop reason: HOSPADM

## 2021-07-18 RX ORDER — ONDANSETRON 2 MG/ML
4 INJECTION INTRAMUSCULAR; INTRAVENOUS EVERY 6 HOURS PRN
Status: DISCONTINUED | OUTPATIENT
Start: 2021-07-18 | End: 2021-07-23 | Stop reason: HOSPADM

## 2021-07-18 RX ORDER — ONDANSETRON 4 MG/1
4 TABLET, FILM COATED ORAL EVERY 6 HOURS PRN
Status: DISCONTINUED | OUTPATIENT
Start: 2021-07-18 | End: 2021-07-23 | Stop reason: HOSPADM

## 2021-07-18 RX ORDER — MONTELUKAST SODIUM 10 MG/1
10 TABLET ORAL NIGHTLY
Status: DISCONTINUED | OUTPATIENT
Start: 2021-07-18 | End: 2021-07-23 | Stop reason: HOSPADM

## 2021-07-18 RX ORDER — ACETAMINOPHEN 325 MG/1
650 TABLET ORAL EVERY 4 HOURS PRN
Status: DISCONTINUED | OUTPATIENT
Start: 2021-07-18 | End: 2021-07-23 | Stop reason: HOSPADM

## 2021-07-18 RX ORDER — ACETAMINOPHEN 650 MG/1
650 SUPPOSITORY RECTAL EVERY 4 HOURS PRN
Status: DISCONTINUED | OUTPATIENT
Start: 2021-07-18 | End: 2021-07-23 | Stop reason: HOSPADM

## 2021-07-18 RX ORDER — MORPHINE SULFATE 4 MG/ML
4 INJECTION, SOLUTION INTRAMUSCULAR; INTRAVENOUS ONCE
Status: COMPLETED | OUTPATIENT
Start: 2021-07-18 | End: 2021-07-19

## 2021-07-18 RX ORDER — GUAIFENESIN 600 MG/1
600 TABLET, EXTENDED RELEASE ORAL EVERY 12 HOURS SCHEDULED
Status: DISCONTINUED | OUTPATIENT
Start: 2021-07-18 | End: 2021-07-23 | Stop reason: HOSPADM

## 2021-07-18 RX ORDER — PREDNISOLONE ACETATE 10 MG/ML
1 SUSPENSION/ DROPS OPHTHALMIC NIGHTLY
Status: DISCONTINUED | OUTPATIENT
Start: 2021-07-18 | End: 2021-07-18

## 2021-07-18 RX ORDER — LOSARTAN POTASSIUM 50 MG/1
100 TABLET ORAL DAILY
Status: DISCONTINUED | OUTPATIENT
Start: 2021-07-19 | End: 2021-07-23 | Stop reason: HOSPADM

## 2021-07-18 RX ORDER — HYDROCODONE BITARTRATE AND ACETAMINOPHEN 5; 325 MG/1; MG/1
1 TABLET ORAL EVERY 4 HOURS PRN
Status: DISCONTINUED | OUTPATIENT
Start: 2021-07-18 | End: 2021-07-23 | Stop reason: HOSPADM

## 2021-07-18 RX ORDER — ACETAMINOPHEN 160 MG/5ML
650 SOLUTION ORAL EVERY 4 HOURS PRN
Status: DISCONTINUED | OUTPATIENT
Start: 2021-07-18 | End: 2021-07-23 | Stop reason: HOSPADM

## 2021-07-18 RX ORDER — KETOROLAC TROMETHAMINE 15 MG/ML
15 INJECTION, SOLUTION INTRAMUSCULAR; INTRAVENOUS ONCE AS NEEDED
Status: COMPLETED | OUTPATIENT
Start: 2021-07-18 | End: 2021-07-18

## 2021-07-18 RX ORDER — DIGOXIN 250 MCG
250 TABLET ORAL DAILY
Status: DISCONTINUED | OUTPATIENT
Start: 2021-07-19 | End: 2021-07-23 | Stop reason: HOSPADM

## 2021-07-18 RX ORDER — POLYETHYLENE GLYCOL 3350 17 G/17G
17 POWDER, FOR SOLUTION ORAL DAILY PRN
Status: DISCONTINUED | OUTPATIENT
Start: 2021-07-18 | End: 2021-07-23 | Stop reason: HOSPADM

## 2021-07-18 RX ORDER — FUROSEMIDE 20 MG/1
10 TABLET ORAL DAILY
Status: DISCONTINUED | OUTPATIENT
Start: 2021-07-19 | End: 2021-07-23 | Stop reason: HOSPADM

## 2021-07-18 RX ORDER — METOPROLOL SUCCINATE 50 MG/1
100 TABLET, EXTENDED RELEASE ORAL DAILY
Status: DISCONTINUED | OUTPATIENT
Start: 2021-07-19 | End: 2021-07-23 | Stop reason: HOSPADM

## 2021-07-18 RX ORDER — BISACODYL 10 MG
10 SUPPOSITORY, RECTAL RECTAL DAILY PRN
Status: DISCONTINUED | OUTPATIENT
Start: 2021-07-18 | End: 2021-07-23 | Stop reason: HOSPADM

## 2021-07-18 RX ADMIN — ENOXAPARIN SODIUM 40 MG: 40 INJECTION SUBCUTANEOUS at 20:46

## 2021-07-18 RX ADMIN — ONDANSETRON HYDROCHLORIDE 4 MG: 4 TABLET, FILM COATED ORAL at 20:42

## 2021-07-18 RX ADMIN — GABAPENTIN 300 MG: 300 CAPSULE ORAL at 20:43

## 2021-07-18 RX ADMIN — FUROSEMIDE 20 MG: 10 INJECTION, SOLUTION INTRAMUSCULAR; INTRAVENOUS at 23:10

## 2021-07-18 RX ADMIN — DOCUSATE SODIUM 50 MG AND SENNOSIDES 8.6 MG 2 TABLET: 8.6; 5 TABLET, FILM COATED ORAL at 20:43

## 2021-07-18 RX ADMIN — IPRATROPIUM BROMIDE 0.5 MG: 0.5 SOLUTION RESPIRATORY (INHALATION) at 22:40

## 2021-07-18 RX ADMIN — AZELASTINE HYDROCHLORIDE 1 SPRAY: 137 SPRAY, METERED NASAL at 23:08

## 2021-07-18 RX ADMIN — CYCLOSPORINE 1 DROP: 0.5 EMULSION OPHTHALMIC at 20:43

## 2021-07-18 RX ADMIN — VANCOMYCIN HYDROCHLORIDE 1500 MG: 10 INJECTION, POWDER, LYOPHILIZED, FOR SOLUTION INTRAVENOUS at 15:57

## 2021-07-18 RX ADMIN — CEFEPIME 2 G: 2 INJECTION, POWDER, FOR SOLUTION INTRAVENOUS at 15:57

## 2021-07-18 RX ADMIN — HYDRALAZINE HYDROCHLORIDE 100 MG: 25 TABLET, FILM COATED ORAL at 20:43

## 2021-07-18 RX ADMIN — Medication 10 ML: at 20:40

## 2021-07-18 RX ADMIN — KETOROLAC TROMETHAMINE 15 MG: 15 INJECTION, SOLUTION INTRAMUSCULAR; INTRAVENOUS at 23:10

## 2021-07-18 RX ADMIN — GUAIFENESIN 600 MG: 600 TABLET, EXTENDED RELEASE ORAL at 20:43

## 2021-07-18 RX ADMIN — IOPAMIDOL 100 ML: 755 INJECTION, SOLUTION INTRAVENOUS at 20:16

## 2021-07-18 RX ADMIN — HYDROCODONE BITARTRATE AND ACETAMINOPHEN 1 TABLET: 5; 325 TABLET ORAL at 20:39

## 2021-07-18 RX ADMIN — MONTELUKAST 10 MG: 10 TABLET, FILM COATED ORAL at 20:43

## 2021-07-18 NOTE — H&P
[facility] Medicine Services      Patient Name: Gali Dover  : 1945  MRN: [id number]  Primary Care Physician:  Chris Pedro MD  Date of admission: 2021      Subjective      Chief Complaint: Shortness of breath.    History of Present Illness: Gali Dover is a 75 y.o. female who presented to [facility] on 2021 complaining of shortness of breath.  Patient is a 75-year-old female with past medical history of neuropathy, low serum IgG, hypertension, hyperlipidemia, GERD, left eye disorder, thyroid disease, COPD, asthma, arthritis and anxiety disorder who presented to the emergency room because of shortness of breath.  Patient's family reported that her oxygen saturation was in the 72 percent on room air.  Patient's oxygen saturation in the emergency room was reported to be in the low 80s prior to administration of oxygen therapy.  Patient reported being in the hospital a couple of weeks ago when she was tested for home oxygen and she did not qualify for home oxygen.  Patient reported having shortness of breath since she was discharged from the hospital but worse in the last 2days.  Patient also complained of cough.  Patient presented to the emergency room for further assessment.  Patient was seen in the emergency room and was diagnosed with HCAP and acute respiratory failure with hypoxia.    Patient reported no fever or chills, no constipation or diarrhea, no headache or visual obscurations, no hot or cold intolerance, no leg swelling or leg pain.      Review of Systems   Constitutional: Negative.   HENT: Negative.    Eyes: Negative.    Cardiovascular: Negative.    Respiratory: Positive for cough, shortness of breath and sputum production.    Endocrine: Negative.    Hematologic/Lymphatic: Negative.    Skin: Negative.    Musculoskeletal: Negative.    Gastrointestinal: Negative.    Genitourinary: Negative.    Neurological: Negative.    Psychiatric/Behavioral:  Negative.    Allergic/Immunologic: Negative.           Personal History     Past Medical History:   Diagnosis Date   • Anxiety    • Arteritis (CMS/HCC)    • Asthma    • Constipation     off and on   • COPD (chronic obstructive pulmonary disease) (CMS/HCC)    • Disease of thyroid gland    • Disorder of left eye    • Dry eyes    • GERD (gastroesophageal reflux disease)    • Hyperlipidemia    • Hypertension    • 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    • Neuropathy    • Stricture of artery (CMS/HCC) 2021       Past Surgical History:   Procedure Laterality Date   • CAROTID SUBCLAVIAN BYPASS Left 2021    Procedure: CAROTID SUBCLAVIAN BYPASS;  Surgeon: Navid Hassan MD;  Location: [facility] MAIN OR;  Service: Vascular;  Laterality: Left;   •  SECTION     • EYE SURGERY      cat ext   • HARDWARE REMOVAL Right     knee   • HYSTERECTOMY      still has ovaries   • KIDNEY SURGERY      stent placed    • KNEE ARTHROSCOPY Right    • LAPAROSCOPIC CHOLECYSTECTOMY         Family History: None.    Social History:  reports that she has quit smoking. She does not have any smokeless tobacco history on file. She reports current alcohol use. She reports previous drug use.    Home Medications:  Prior to Admission Medications     Prescriptions Last Dose Informant Patient Reported? Taking?    albuterol sulfate  (90 Base) MCG/ACT inhaler   Yes No    Inhale 2 puffs Every 4 (Four) Hours As Needed for Wheezing.    aspirin 325 MG tablet 2021  Yes Yes    Take 325 mg by mouth Daily.    azelastine (ASTELIN) 0.1 % nasal spray 2021  Yes Yes    1 spray into the nostril(s) as directed by provider 2 (Two) Times a Day. Use in each nostril as directed    budesonide-formoterol (SYMBICORT) 160-4.5 MCG/ACT inhaler Not Taking  Yes No    Inhale 2 puffs 2 (Two) Times a Day. Use and bring dos    Patient not taking:  Reported on 2021    calcium carbonate (OS-VANDANA) 600 MG tablet 2021  Yes Yes    Take 600 mg by mouth Daily.     Cholecalciferol (Vitamin D) 50 MCG (2000 UT) capsule 7/17/2021  Yes Yes    Take 2,000 Units by mouth Daily. A    cycloSPORINE (RESTASIS) 0.05 % ophthalmic emulsion 7/17/2021  Yes Yes    Administer 1 drop to both eyes 2 (Two) Times a Day.    digoxin (LANOXIN) 250 MCG tablet 7/17/2021  Yes Yes    Take 250 mcg by mouth every night at bedtime.    escitalopram (LEXAPRO) 10 MG tablet 7/17/2021  Yes Yes    Take 10 mg by mouth Every Morning.    ezetimibe (Zetia) 10 MG tablet 7/17/2021  Yes Yes    Take 10 mg by mouth every night at bedtime.    fluticasone (FLONASE) 50 MCG/ACT nasal spray 7/17/2021  Yes Yes    2 sprays into the nostril(s) as directed by provider Every Morning.    furosemide (LASIX) 20 MG tablet 7/17/2021  Yes Yes    Take 10 mg by mouth Every Morning.    gabapentin (NEURONTIN) 300 MG capsule 7/17/2021  Yes Yes    Take 300 mg by mouth 3 (Three) Times a Day.    guaiFENesin (MUCINEX) 600 MG 12 hr tablet 7/17/2021  No Yes    Take 1 tablet by mouth Every 12 (Twelve) Hours.    hydrALAZINE (APRESOLINE) 100 MG tablet 7/17/2021  Yes Yes    Take 100 mg by mouth 3 (Three) Times a Day. Take dos    HYDROcodone-acetaminophen (NORCO) 5-325 MG per tablet 7/17/2021  No Yes    Take 1 tablet by mouth Every 4 (Four) Hours As Needed for Severe Pain .    immune globulin, human, (GAMMAGARD) 1 GM/10ML solution infusion   Yes No    Infuse  into a venous catheter 1 (One) Time.    levothyroxine (SYNTHROID, LEVOTHROID) 25 MCG tablet 7/17/2021  Yes Yes    Take 25 mcg by mouth Every Morning.    losartan (COZAAR) 100 MG tablet 7/17/2021  Yes Yes    Take 100 mg by mouth Every Morning.    loteprednol (LOTEMAX) 0.5 % ophthalmic suspension 7/17/2021  Yes Yes    Administer 1 drop into the left eye every night at bedtime.    metoprolol succinate XL (TOPROL-XL) 100 MG 24 hr tablet 7/17/2021  Yes Yes    Take 100 mg by mouth Every Morning.    montelukast (SINGULAIR) 10 MG tablet 7/17/2021  Yes Yes    Take 10 mg by mouth Every Night.    NIFEdipine XL  (PROCARDIA XL) 60 MG 24 hr tablet 7/17/2021  Yes Yes    Take 60 mg by mouth 2 (two) times a day.    omeprazole (PrilOSEC) 20 MG capsule 7/17/2021  Yes Yes    Take 20 mg by mouth Every Morning.    ondansetron (Zofran) 4 MG tablet   No No    Take 1 tablet by mouth Every 6 (Six) Hours As Needed for Nausea or Vomiting.    Probiotic Product (PROBIOTIC PO)   Yes No    Take 1 capsule by mouth Daily.    simvastatin (ZOCOR) 40 MG tablet 7/17/2021  Yes Yes    Take 40 mg by mouth Every Night.    tiotropium (SPIRIVA) 18 MCG per inhalation capsule 7/17/2021  Yes Yes    Place 1 capsule into inhaler and inhale Every Morning. Use and bring dos    Tocilizumab (Actemra) 162 MG/0.9ML solution prefilled syringe Past Week  Yes Yes    Inject 1 syringe under the skin into the appropriate area as directed 1 (One) Time Per Week. Monday            Allergies:  Allergies   Allergen Reactions   • Crestor [Rosuvastatin] Myalgia   • Lipitor [Atorvastatin] Myalgia   • Penicillins Hives   • Hctz [Hydrochlorothiazide] Rash       Objective      Vitals:   Temp:  [99 °F (37.2 °C)] 99 °F (37.2 °C)  Heart Rate:  [82] 82  Resp:  [18] 18  BP: (153)/(55) 153/55    Physical Exam  Vitals and nursing note reviewed.   Constitutional:       Comments: Patient appears to be mild respiratory distress.   HENT:      Head: Normocephalic and atraumatic.      Nose: Nose normal. No congestion or rhinorrhea.      Mouth/Throat:      Mouth: Mucous membranes are moist.      Pharynx: Oropharynx is clear. No oropharyngeal exudate or posterior oropharyngeal erythema.   Eyes:      Pupils: Pupils are equal, round, and reactive to light.   Cardiovascular:      Pulses: Normal pulses.      Heart sounds: Normal heart sounds. No murmur heard.   No friction rub. No gallop.       Comments: S1 and S2 present.  No tachycardia.  Pulmonary:      Effort: No respiratory distress.      Breath sounds: Wheezing present. No rhonchi or rales.      Comments: Decreased air entry bilaterally.  Poor  air movement.  Mild wheezing present.  Chest:      Chest wall: No tenderness.   Abdominal:      General: Abdomen is flat. Bowel sounds are normal. There is no distension.      Palpations: Abdomen is soft.      Tenderness: There is no right CVA tenderness.   Musculoskeletal:         General: No swelling, tenderness, deformity or signs of injury.      Cervical back: Neck supple. No tenderness.      Right lower leg: No edema.      Left lower leg: No edema.   Skin:     Capillary Refill: Capillary refill takes less than 2 seconds.      Coloration: Skin is not jaundiced.      Findings: No bruising, lesion or rash.   Neurological:      Comments: No facial asymmetry noted.  Gait and station not tested.   Psychiatric:      Comments: No agitation.            Result Review    Result Review:  I have personally reviewed the results from the time of this admission to 7/18/2021 17:30 EDT and agree with these findings:  [x]  Laboratory  [x]  Microbiology  [x]  Radiology  []  EKG/Telemetry   []  Cardiology/Vascular   []  Pathology  []  Old records  []  Other:  Most notable findings include: D-dimer elevated at 1.94.  Hemoglobin 9.5 hematocrit 28.7.      Assessment/Plan        Active Hospital Problems:  Active Hospital Problems    Diagnosis    • HCAP (healthcare-associated pneumonia)  Treat with antibiotics.      • Acute respiratory failure with hypoxia (CMS/HCC)  Continue oxygen therapy.  Rule out PE  Complete pulmonary consult.      • Hypothyroid  Treat with Synthroid.      • Hyperlipidemia      • Polymyalgia     • Paroxysmal atrial fibrillation (CMS/HCC).    Continue to monitor.      • COPD (chronic obstructive pulmonary disease) with chronic bronchitis (CMS/HCC)  Treat with nebs.      • GERD (gastroesophageal reflux disease)  Treat with Protonix.      • Essential hypertension  Treat with hydralazine as needed.        Continue appropriate patient's home medications for other chronic medical conditions.  Continue the present level  of care.  Patient and family agreed with the plan of care.    DVT prophylaxis:  No DVT prophylaxis order currently exists.    CODE STATUS:    Level Of Support Discussed With: Patient  Code Status: CPR  Medical Interventions (Level of Support Prior to Arrest): Full    Admission Status:  I believe this patient meets observation status.    I discussed the patient's findings and my recommendations with patient, family, nursing staff, primary care team and consulting provider.    This patient has been examined wearing appropriate Personal Protective Equipment and discussed with hospital infection control department, [facility], infectious disease specialist and pulmonologist. 07/18/21      Signature: Electronically signed by Amrit Ramsey MD, 07/18/21, 5:30 PM EDT.

## 2021-07-18 NOTE — PROGRESS NOTES
"Pharmacy Antimicrobial Dosing Service    Subjective:  Gali Dover is a 75 y.o.female admitted.  Pharmacy has been consulted to dose Vancomycin and Cefepime for possible HCAP.    PMH: Prior carotid procedure and LOS 6/30-7/5      Assessment/Plan    1. Day #1 Cefepime: 2 IV q8h for estCrCl > 60 mL/min.    2. Day #1 Vancomycin: Pending MRSA nasal swab. Received dose in ER on 7/18 at 1600    Will continue to monitor drug levels, renal function, culture and sensitivities, and patient clinical status.       Objective:  Relevant clinical data and objective history reviewed:  170.2 cm (67\")   78 kg (171 lb 15.3 oz)   Ideal body weight: 61.6 kg (135 lb 12.9 oz)  Adjusted ideal body weight: 68.2 kg (150 lb 4.2 oz)  Body mass index is 26.93 kg/m².    Results from last 7 days   Lab Units 07/18/21  1432   CREATININE mg/dL 0.60     Estimated Creatinine Clearance: 64.4 mL/min (by C-G formula based on SCr of 0.6 mg/dL).  No intake/output data recorded.    Results from last 7 days   Lab Units 07/18/21  1432   WBC 10*3/mm3 7.20     Temperature    07/18/21 1347   Temp: 99 °F (37.2 °C)     Baseline culture/source/susceptibility:  Microbiology Results (last 10 days)       Procedure Component Value - Date/Time    Respiratory Panel PCR w/COVID-19(SARS-CoV-2) JENNIFER/BRIAN/IFRAH/PAD/COR/MAD/CATHERINE In-House, NP Swab in UTM/VTM, 3-4 HR TAT - Swab, Nasopharynx [[id number]]  (Normal) Collected: 07/18/21 1432    Lab Status: Final result Specimen: Swab from Nasopharynx Updated: 07/18/21 1526     ADENOVIRUS, PCR Not Detected     Coronavirus 229E Not Detected     Coronavirus HKU1 Not Detected     Coronavirus NL63 Not Detected     Coronavirus OC43 Not Detected     COVID19 Not Detected     Human Metapneumovirus Not Detected     Human Rhinovirus/Enterovirus Not Detected     Influenza A PCR Not Detected     Influenza B PCR Not Detected     Parainfluenza Virus 1 Not Detected     Parainfluenza Virus 2 Not Detected     Parainfluenza Virus 3 Not Detected     " Parainfluenza Virus 4 Not Detected     RSV, PCR Not Detected     Bordetella pertussis pcr Not Detected     Bordetella parapertussis PCR Not Detected     Chlamydophila pneumoniae PCR Not Detected     Mycoplasma pneumo by PCR Not Detected    Narrative:      In the setting of a positive respiratory panel with a viral infection PLUS a negative procalcitonin without other underlying concern for bacterial infection, consider observing off antibiotics or discontinuation of antibiotics and continue supportive care. If the respiratory panel is positive for atypical bacterial infection (Bordetella pertussis, Chlamydophila pneumoniae, or Mycoplasma pneumoniae), consider antibiotic de-escalation to target atypical bacterial infection.             Anti-Infectives (From admission, onward)      Ordered     Dose/Rate Route Frequency Start Stop    07/18/21 1848  cefepime 2 gm IVPB in 100 ml NS (MBP)     Ordering Provider: Amrit Ramsey MD    2 g  over 4 Hours Intravenous Every 8 Hours 07/19/21 0100 07/26/21 0059    07/18/21 1801  Pharmacy Consult for Antimicrobial Stewardship     Ordering Provider: Amrit Ramsey MD     Does not apply Once 07/18/21 1803      07/18/21 1529  cefepime 2 gm IVPB in 100 ml NS (MBP)     Ordering Provider: David Monzon MD    2 g  over 30 Minutes Intravenous Once 07/18/21 1531 07/18/21 1631    07/18/21 1529  vancomycin 1500 mg/500 mL 0.9% NS IVPB ([facility])     Ordering Provider: David Monzon MD    20 mg/kg × 78 kg Intravenous Once 07/18/21 1531 07/18/21 1557            Ashlee Hutton, Elsa  07/18/21 18:50 EDT

## 2021-07-18 NOTE — ED PROVIDER NOTES
Subjective   Chief complaint: Shortness of breath    75-year-old female presents with shortness of breath.  Patient has a history of COPD.  Patient was recently in the hospital after having a left carotid/subclavian artery bypass.  She was treated for COPD exacerbation while in the hospital.  She states they tested her to see if she needed home oxygen but she did not qualify.  She has been home for a couple weeks.  She states she has had progressively worsening shortness of breath over the past few days.  She has had a nonproductive cough.  She denies any fever.  She denies any alleviating or exacerbating factors.  She reports some mild chest discomfort associated with this.  She was placed on oxygen and was given a breathing treatment by EMS and she states she is now feeling much better after arriving to the emergency room.      History provided by:  Patient      Review of Systems   Constitutional: Negative for fever.   HENT: Negative for congestion and sore throat.    Eyes: Negative for redness.   Respiratory: Positive for cough and shortness of breath.    Cardiovascular: Positive for chest pain.   Gastrointestinal: Negative for abdominal pain, diarrhea and vomiting.   Genitourinary: Negative for dysuria.   Musculoskeletal: Negative for back pain.   Skin: Negative for rash.   Neurological: Negative for dizziness and headaches.   Psychiatric/Behavioral: Negative for confusion.       Past Medical History:   Diagnosis Date   • Anxiety    • Arteritis (CMS/[facility])    • Asthma    • Constipation     off and on   • COPD (chronic obstructive pulmonary disease) (CMS/[facility])    • Disease of thyroid gland    • Disorder of left eye    • Dry eyes    • GERD (gastroesophageal reflux disease)    • Hyperlipidemia    • Hypertension    • 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    • Neuropathy    • Stricture of artery (CMS/[facility]) 06/2021       Allergies   Allergen Reactions   • Crestor [Rosuvastatin] Myalgia   • Lipitor [Atorvastatin] Myalgia   • Penicillins  "Hives   • Hctz [Hydrochlorothiazide] Rash       Past Surgical History:   Procedure Laterality Date   • CAROTID SUBCLAVIAN BYPASS Left 2021    Procedure: CAROTID SUBCLAVIAN BYPASS;  Surgeon: Navid Hassan MD;  Location: [facility] MAIN OR;  Service: Vascular;  Laterality: Left;   •  SECTION     • EYE SURGERY      cat ext   • HARDWARE REMOVAL Right     knee   • HYSTERECTOMY      still has ovaries   • KIDNEY SURGERY      stent placed    • KNEE ARTHROSCOPY Right    • LAPAROSCOPIC CHOLECYSTECTOMY         No family history on file.    Social History     Socioeconomic History   • Marital status:      Spouse name: Not on file   • Number of children: Not on file   • Years of education: Not on file   • Highest education level: Not on file   Tobacco Use   • Smoking status: Former Smoker   Substance and Sexual Activity   • Alcohol use: Yes     Comment: social   • Drug use: Not Currently       /55   Pulse 82   Temp 99 °F (37.2 °C) (Oral)   Resp 18   Ht 170.2 cm (67\")   Wt 78 kg (171 lb 15.3 oz)   SpO2 100%   BMI 26.93 kg/m²       Objective   Physical Exam  Vitals and nursing note reviewed.   Constitutional:       Appearance: She is well-developed.   HENT:      Head: Normocephalic and atraumatic.   Eyes:      Extraocular Movements: Extraocular movements intact.      Pupils: Pupils are equal, round, and reactive to light.   Cardiovascular:      Rate and Rhythm: Normal rate and regular rhythm.      Heart sounds: Normal heart sounds.   Pulmonary:      Effort: Pulmonary effort is normal. No respiratory distress.      Breath sounds: Normal breath sounds.   Abdominal:      General: Bowel sounds are normal.      Palpations: Abdomen is soft.      Tenderness: There is no abdominal tenderness.   Musculoskeletal:         General: Normal range of motion.      Cervical back: Normal range of motion and neck supple.   Skin:     General: Skin is warm and dry.   Neurological:      General: No focal deficit " present.      Mental Status: She is alert and oriented to person, place, and time.         Procedures           ED Course      My interpretation of EKG shows sinus rhythm, rate of 89, mild nonspecific ST changes, no STEMI     Results for orders placed or performed during the hospital encounter of 07/18/21   Respiratory Panel PCR w/COVID-19(SARS-CoV-2) JENNIFER/BRIAN/IFRAH/PAD/COR/MAD/CATHERINE In-House, NP Swab in UTM/VTM, 3-4 HR TAT - Swab, Nasopharynx    Specimen: Nasopharynx; Swab   Result Value Ref Range    ADENOVIRUS, PCR Not Detected Not Detected    Coronavirus 229E Not Detected Not Detected    Coronavirus HKU1 Not Detected Not Detected    Coronavirus NL63 Not Detected Not Detected    Coronavirus OC43 Not Detected Not Detected    COVID19 Not Detected Not Detected - Ref. Range    Human Metapneumovirus Not Detected Not Detected    Human Rhinovirus/Enterovirus Not Detected Not Detected    Influenza A PCR Not Detected Not Detected    Influenza B PCR Not Detected Not Detected    Parainfluenza Virus 1 Not Detected Not Detected    Parainfluenza Virus 2 Not Detected Not Detected    Parainfluenza Virus 3 Not Detected Not Detected    Parainfluenza Virus 4 Not Detected Not Detected    RSV, PCR Not Detected Not Detected    Bordetella pertussis pcr Not Detected Not Detected    Bordetella parapertussis PCR Not Detected Not Detected    Chlamydophila pneumoniae PCR Not Detected Not Detected    Mycoplasma pneumo by PCR Not Detected Not Detected   Basic Metabolic Panel    Specimen: Blood   Result Value Ref Range    Glucose 101 (H) 65 - 99 mg/dL    BUN 8 8 - 23 mg/dL    Creatinine 0.60 0.57 - 1.00 mg/dL    Sodium 135 (L) 136 - 145 mmol/L    Potassium 4.0 3.5 - 5.2 mmol/L    Chloride 100 98 - 107 mmol/L    CO2 23.0 22.0 - 29.0 mmol/L    Calcium 8.9 8.6 - 10.5 mg/dL    eGFR Non African Amer 97 >60 mL/min/1.73    BUN/Creatinine Ratio 13.3 7.0 - 25.0    Anion Gap 12.0 5.0 - 15.0 mmol/L   Protime-INR    Specimen: Blood   Result Value Ref Range     Protime 10.9 9.6 - 11.7 Seconds    INR 0.98 0.93 - 1.10   aPTT    Specimen: Blood   Result Value Ref Range    PTT 21.1 (L) 24.0 - 31.0 seconds   Troponin    Specimen: Blood   Result Value Ref Range    Troponin T 0.020 0.000 - 0.030 ng/mL   BNP    Specimen: Blood   Result Value Ref Range    proBNP 644.1 0.0-1,800.0 pg/mL   CBC Auto Differential    Specimen: Blood   Result Value Ref Range    WBC 7.20 3.40 - 10.80 10*3/mm3    RBC 3.30 (L) 3.77 - 5.28 10*6/mm3    Hemoglobin 9.5 (L) 12.0 - 15.9 g/dL    Hematocrit 28.7 (L) 34.0 - 46.6 %    MCV 86.9 79.0 - 97.0 fL    MCH 28.8 26.6 - 33.0 pg    MCHC 33.2 31.5 - 35.7 g/dL    RDW 14.6 12.3 - 15.4 %    RDW-SD 44.2 37.0 - 54.0 fl    MPV 8.4 6.0 - 12.0 fL    Platelets 410 140 - 450 10*3/mm3   Manual Differential    Specimen: Blood   Result Value Ref Range    Neutrophil % 65.0 42.7 - 76.0 %    Lymphocyte % 22.0 19.6 - 45.3 %    Monocyte % 6.0 5.0 - 12.0 %    Eosinophil % 4.0 0.3 - 6.2 %    Bands %  1.0 0.0 - 5.0 %    Metamyelocyte % 2.0 (H) 0.0 - 0.0 %    Neutrophils Absolute 4.75 1.70 - 7.00 10*3/mm3    Lymphocytes Absolute 1.58 0.70 - 3.10 10*3/mm3    Monocytes Absolute 0.43 0.10 - 0.90 10*3/mm3    Eosinophils Absolute 0.29 0.00 - 0.40 10*3/mm3    RBC Morphology Normal Normal    WBC Morphology Normal Normal    Platelet Estimate Adequate Normal    Large Platelets Slight/1+ None Seen    Giant Platelets Slight/1+ None Seen     XR Chest 1 View    Result Date: 7/18/2021  1.Worsening appearance right basilar that could relate to underlying effusion as well as atelectasis. 2.Left basilar density that could relate to some underlying atelectasis and pleural effusion in this area. 3.Cardiomegaly 4.Prominence of central pulmonary markings which could reflect some vascular congestion.  Electronically Signed By-Mohamud Gomez MD On:7/18/2021 2:57 PM This report was finalized on [id number] by  Mohamud Gomez MD.                                    MDM   Patient had the above evaluation.   Results were discussed with the patient.  Respiratory panel was negative.  Chest x-ray was read as a worsening in the right base which could be underlying effusion and atelectasis.  I am concerned about pneumonia as well.  Patient was started on IV antibiotics.  Blood cultures were obtained.  White blood cell count is normal.  Troponin is normal.  Patient has been on a nasal cannula in the emergency room.  We attempted to place her on room air but her oxygen saturations dropped into the mid to lower 80s.  She is currently on 2 to 3 L nasal cannula and seems to be doing well with this.  I discussed with the hospitalist and the patient will be admitted for further evaluation and management.      Final diagnoses:   Pneumonia due to infectious organism, unspecified laterality, unspecified part of lung   Dyspnea, unspecified type   Hypoxia       ED Disposition  ED Disposition     ED Disposition Condition Comment    Decision to Admit  Level of Care: Telemetry [5]   Diagnosis: Pneumonia due to infectious organism, unspecified laterality, unspecified part of lung [[id number]]   Admitting Physician: JUSTIN JOSEPH [[id number]]            No follow-up provider specified.       Medication List      No changes were made to your prescriptions during this visit.          David Monzon MD  07/18/21 5383

## 2021-07-19 ENCOUNTER — APPOINTMENT (OUTPATIENT)
Dept: CARDIOLOGY | Facility: HOSPITAL | Age: 76
End: 2021-07-19

## 2021-07-19 ENCOUNTER — APPOINTMENT (OUTPATIENT)
Dept: GENERAL RADIOLOGY | Facility: HOSPITAL | Age: 76
End: 2021-07-19

## 2021-07-19 PROBLEM — J18.9 PNEUMONIA DUE TO INFECTIOUS ORGANISM: Status: ACTIVE | Noted: 2021-07-19

## 2021-07-19 LAB
ANION GAP SERPL CALCULATED.3IONS-SCNC: 8 MMOL/L (ref 5–15)
APPEARANCE FLD: ABNORMAL
BASOPHILS # BLD AUTO: 0.1 10*3/MM3 (ref 0–0.2)
BASOPHILS NFR BLD AUTO: 0.9 % (ref 0–1.5)
BH CV ECHO MEAS - % IVS THICK: 38.5 %
BH CV ECHO MEAS - % LVPW THICK: 22.5 %
BH CV ECHO MEAS - ACS: 1.7 CM
BH CV ECHO MEAS - AO MAX PG (FULL): 1.9 MMHG
BH CV ECHO MEAS - AO MAX PG: 7.1 MMHG
BH CV ECHO MEAS - AO MEAN PG (FULL): 0.64 MMHG
BH CV ECHO MEAS - AO MEAN PG: 4.1 MMHG
BH CV ECHO MEAS - AO ROOT AREA (BSA CORRECTED): 1.5
BH CV ECHO MEAS - AO ROOT AREA: 6.7 CM^2
BH CV ECHO MEAS - AO ROOT DIAM: 2.9 CM
BH CV ECHO MEAS - AO V2 MAX: 133.1 CM/SEC
BH CV ECHO MEAS - AO V2 MEAN: 97.4 CM/SEC
BH CV ECHO MEAS - AO V2 VTI: 33.4 CM
BH CV ECHO MEAS - ASC AORTA: 2.9 CM
BH CV ECHO MEAS - AVA(I,A): 2.3 CM^2
BH CV ECHO MEAS - AVA(I,D): 2.3 CM^2
BH CV ECHO MEAS - AVA(V,A): 2.1 CM^2
BH CV ECHO MEAS - AVA(V,D): 2.1 CM^2
BH CV ECHO MEAS - BSA(HAYCOCK): 1.9 M^2
BH CV ECHO MEAS - BSA: 1.9 M^2
BH CV ECHO MEAS - BZI_BMI: 26.5 KILOGRAMS/M^2
BH CV ECHO MEAS - BZI_METRIC_HEIGHT: 170.2 CM
BH CV ECHO MEAS - BZI_METRIC_WEIGHT: 76.7 KG
BH CV ECHO MEAS - EDV(CUBED): 66.3 ML
BH CV ECHO MEAS - EDV(MOD-SP4): 56.1 ML
BH CV ECHO MEAS - EDV(TEICH): 72 ML
BH CV ECHO MEAS - EF(CUBED): 81.8 %
BH CV ECHO MEAS - EF(MOD-BP): 70 %
BH CV ECHO MEAS - EF(MOD-SP4): 78 %
BH CV ECHO MEAS - EF(TEICH): 75 %
BH CV ECHO MEAS - ESV(CUBED): 12.1 ML
BH CV ECHO MEAS - ESV(MOD-SP4): 12.3 ML
BH CV ECHO MEAS - ESV(TEICH): 18 ML
BH CV ECHO MEAS - FS: 43.3 %
BH CV ECHO MEAS - IVS/LVPW: 0.95
BH CV ECHO MEAS - IVSD: 1.2 CM
BH CV ECHO MEAS - IVSS: 1.7 CM
BH CV ECHO MEAS - LA DIMENSION(2D): 4.4 CM
BH CV ECHO MEAS - LV DIASTOLIC VOL/BSA (35-75): 29.8 ML/M^2
BH CV ECHO MEAS - LV MASS(C)D: 185.7 GRAMS
BH CV ECHO MEAS - LV MASS(C)DI: 98.7 GRAMS/M^2
BH CV ECHO MEAS - LV MASS(C)S: 139.2 GRAMS
BH CV ECHO MEAS - LV MASS(C)SI: 73.9 GRAMS/M^2
BH CV ECHO MEAS - LV MAX PG: 5.2 MMHG
BH CV ECHO MEAS - LV MEAN PG: 3.4 MMHG
BH CV ECHO MEAS - LV SYSTOLIC VOL/BSA (12-30): 6.5 ML/M^2
BH CV ECHO MEAS - LV V1 MAX: 113.8 CM/SEC
BH CV ECHO MEAS - LV V1 MEAN: 90.5 CM/SEC
BH CV ECHO MEAS - LV V1 VTI: 31 CM
BH CV ECHO MEAS - LVIDD: 4 CM
BH CV ECHO MEAS - LVIDS: 2.3 CM
BH CV ECHO MEAS - LVOT AREA: 2.4 CM^2
BH CV ECHO MEAS - LVOT DIAM: 1.8 CM
BH CV ECHO MEAS - LVPWD: 1.3 CM
BH CV ECHO MEAS - LVPWS: 1.6 CM
BH CV ECHO MEAS - MR MAX PG: 114.3 MMHG
BH CV ECHO MEAS - MR MAX VEL: 534.5 CM/SEC
BH CV ECHO MEAS - MV A MAX VEL: 124.6 CM/SEC
BH CV ECHO MEAS - MV DEC SLOPE: 550.9 CM/SEC^2
BH CV ECHO MEAS - MV DEC TIME: 0.24 SEC
BH CV ECHO MEAS - MV E MAX VEL: 131.6 CM/SEC
BH CV ECHO MEAS - MV E/A: 1.1
BH CV ECHO MEAS - MV MAX PG: 9.9 MMHG
BH CV ECHO MEAS - MV MEAN PG: 3 MMHG
BH CV ECHO MEAS - MV V2 MAX: 157.5 CM/SEC
BH CV ECHO MEAS - MV V2 MEAN: 78.6 CM/SEC
BH CV ECHO MEAS - MV V2 VTI: 46.8 CM
BH CV ECHO MEAS - MVA(VTI): 1.6 CM^2
BH CV ECHO MEAS - PA ACC TIME: 0.08 SEC
BH CV ECHO MEAS - PA MAX PG (FULL): 1.5 MMHG
BH CV ECHO MEAS - PA MAX PG: 7 MMHG
BH CV ECHO MEAS - PA MEAN PG (FULL): 0.46 MMHG
BH CV ECHO MEAS - PA MEAN PG: 3.4 MMHG
BH CV ECHO MEAS - PA PR(ACCEL): 41.6 MMHG
BH CV ECHO MEAS - PA V2 MAX: 132.5 CM/SEC
BH CV ECHO MEAS - PA V2 MEAN: 86.6 CM/SEC
BH CV ECHO MEAS - PA V2 VTI: 29.4 CM
BH CV ECHO MEAS - PVA(I,A): 3.8 CM^2
BH CV ECHO MEAS - PVA(I,D): 3.8 CM^2
BH CV ECHO MEAS - PVA(V,A): 3.7 CM^2
BH CV ECHO MEAS - PVA(V,D): 3.7 CM^2
BH CV ECHO MEAS - QP/QS: 1.5
BH CV ECHO MEAS - RAP SYSTOLE: 3 MMHG
BH CV ECHO MEAS - RV MAX PG: 5.5 MMHG
BH CV ECHO MEAS - RV MEAN PG: 3 MMHG
BH CV ECHO MEAS - RV V1 MAX: 117.4 CM/SEC
BH CV ECHO MEAS - RV V1 MEAN: 82.2 CM/SEC
BH CV ECHO MEAS - RV V1 VTI: 26.2 CM
BH CV ECHO MEAS - RVDD: 3.2 CM
BH CV ECHO MEAS - RVOT AREA: 4.2 CM^2
BH CV ECHO MEAS - RVOT DIAM: 2.3 CM
BH CV ECHO MEAS - RVSP: 26.8 MMHG
BH CV ECHO MEAS - SI(AO): 118.3 ML/M^2
BH CV ECHO MEAS - SI(CUBED): 28.8 ML/M^2
BH CV ECHO MEAS - SI(LVOT): 40.2 ML/M^2
BH CV ECHO MEAS - SI(MOD-SP4): 23.3 ML/M^2
BH CV ECHO MEAS - SI(TEICH): 28.7 ML/M^2
BH CV ECHO MEAS - SV(AO): 222.6 ML
BH CV ECHO MEAS - SV(CUBED): 54.2 ML
BH CV ECHO MEAS - SV(LVOT): 75.6 ML
BH CV ECHO MEAS - SV(MOD-SP4): 43.8 ML
BH CV ECHO MEAS - SV(RVOT): 110.7 ML
BH CV ECHO MEAS - SV(TEICH): 54 ML
BH CV ECHO MEAS - TR MAX VEL: 242.4 CM/SEC
BUN SERPL-MCNC: 6 MG/DL (ref 8–23)
BUN/CREAT SERPL: 9.7 (ref 7–25)
CALCIUM SPEC-SCNC: 8.2 MG/DL (ref 8.6–10.5)
CHLORIDE SERPL-SCNC: 103 MMOL/L (ref 98–107)
CO2 SERPL-SCNC: 26 MMOL/L (ref 22–29)
COLOR FLD: YELLOW
CREAT SERPL-MCNC: 0.62 MG/DL (ref 0.57–1)
DEPRECATED RDW RBC AUTO: 43.8 FL (ref 37–54)
DIGOXIN SERPL-MCNC: 1 NG/ML (ref 0.6–1.2)
EOSINOPHIL # BLD AUTO: 0.6 10*3/MM3 (ref 0–0.4)
EOSINOPHIL NFR BLD AUTO: 7 % (ref 0.3–6.2)
EOSINOPHIL NFR FLD MANUAL: 1 %
ERYTHROCYTE [DISTWIDTH] IN BLOOD BY AUTOMATED COUNT: 14.4 % (ref 12.3–15.4)
GFR SERPL CREATININE-BSD FRML MDRD: 94 ML/MIN/1.73
GLUCOSE SERPL-MCNC: 87 MG/DL (ref 65–99)
HCT VFR BLD AUTO: 23.1 % (ref 34–46.6)
HGB BLD-MCNC: 7.9 G/DL (ref 12–15.9)
LDH FLD-CCNC: 75 U/L
LDH SERPL-CCNC: 290 U/L (ref 135–214)
LV EF 2D ECHO EST: 70 %
LYMPHOCYTES # BLD AUTO: 1 10*3/MM3 (ref 0.7–3.1)
LYMPHOCYTES NFR BLD AUTO: 10.5 % (ref 19.6–45.3)
LYMPHOCYTES NFR FLD MANUAL: 55 %
MACROPHAGE FLUID: 14 %
MAGNESIUM SERPL-MCNC: 1.7 MG/DL (ref 1.6–2.4)
MAXIMAL PREDICTED HEART RATE: 144 BPM
MCH RBC QN AUTO: 29.6 PG (ref 26.6–33)
MCHC RBC AUTO-ENTMCNC: 34.1 G/DL (ref 31.5–35.7)
MCV RBC AUTO: 86.7 FL (ref 79–97)
MESOTHL CELL NFR FLD MANUAL: 8 %
METHOD: ABNORMAL
MONOCYTES # BLD AUTO: 0.6 10*3/MM3 (ref 0.1–0.9)
MONOCYTES NFR BLD AUTO: 6.7 % (ref 5–12)
MONOCYTES NFR FLD: 19 %
MRSA DNA SPEC QL NAA+PROBE: NORMAL
NEUTROPHILS NFR BLD AUTO: 6.9 10*3/MM3 (ref 1.7–7)
NEUTROPHILS NFR BLD AUTO: 74.9 % (ref 42.7–76)
NEUTROPHILS NFR FLD MANUAL: 3 %
NRBC BLD AUTO-RTO: 0.1 /100 WBC (ref 0–0.2)
NUC CELL # FLD: 360 /MM3
PH FLD: 7 [PH] (ref 6.5–7.5)
PLATELET # BLD AUTO: 356 10*3/MM3 (ref 140–450)
PMV BLD AUTO: 8 FL (ref 6–12)
POTASSIUM SERPL-SCNC: 3.9 MMOL/L (ref 3.5–5.2)
PROT FLD-MCNC: 1.6 G/DL
RBC # BLD AUTO: 2.67 10*6/MM3 (ref 3.77–5.28)
SODIUM SERPL-SCNC: 137 MMOL/L (ref 136–145)
STRESS TARGET HR: 122 BPM
WBC # BLD AUTO: 9.1 10*3/MM3 (ref 3.4–10.8)

## 2021-07-19 PROCEDURE — 89051 BODY FLUID CELL COUNT: CPT | Performed by: NURSE PRACTITIONER

## 2021-07-19 PROCEDURE — 80048 BASIC METABOLIC PNL TOTAL CA: CPT | Performed by: INTERNAL MEDICINE

## 2021-07-19 PROCEDURE — 0W993ZX DRAINAGE OF RIGHT PLEURAL CAVITY, PERCUTANEOUS APPROACH, DIAGNOSTIC: ICD-10-PCS | Performed by: INTERNAL MEDICINE

## 2021-07-19 PROCEDURE — 88108 CYTOPATH CONCENTRATE TECH: CPT | Performed by: NURSE PRACTITIONER

## 2021-07-19 PROCEDURE — 99233 SBSQ HOSP IP/OBS HIGH 50: CPT | Performed by: INTERNAL MEDICINE

## 2021-07-19 PROCEDURE — 94799 UNLISTED PULMONARY SVC/PX: CPT

## 2021-07-19 PROCEDURE — 25010000002 CEFEPIME PER 500 MG: Performed by: INTERNAL MEDICINE

## 2021-07-19 PROCEDURE — 83986 ASSAY PH BODY FLUID NOS: CPT | Performed by: NURSE PRACTITIONER

## 2021-07-19 PROCEDURE — 71045 X-RAY EXAM CHEST 1 VIEW: CPT

## 2021-07-19 PROCEDURE — 84157 ASSAY OF PROTEIN OTHER: CPT | Performed by: NURSE PRACTITIONER

## 2021-07-19 PROCEDURE — 87205 SMEAR GRAM STAIN: CPT | Performed by: NURSE PRACTITIONER

## 2021-07-19 PROCEDURE — 80162 ASSAY OF DIGOXIN TOTAL: CPT

## 2021-07-19 PROCEDURE — 83735 ASSAY OF MAGNESIUM: CPT | Performed by: INTERNAL MEDICINE

## 2021-07-19 PROCEDURE — 87070 CULTURE OTHR SPECIMN AEROBIC: CPT | Performed by: NURSE PRACTITIONER

## 2021-07-19 PROCEDURE — 83615 LACTATE (LD) (LDH) ENZYME: CPT | Performed by: NURSE PRACTITIONER

## 2021-07-19 PROCEDURE — 85025 COMPLETE CBC W/AUTO DIFF WBC: CPT | Performed by: INTERNAL MEDICINE

## 2021-07-19 PROCEDURE — 87641 MR-STAPH DNA AMP PROBE: CPT | Performed by: INTERNAL MEDICINE

## 2021-07-19 PROCEDURE — 93306 TTE W/DOPPLER COMPLETE: CPT

## 2021-07-19 PROCEDURE — 63710000001 ONDANSETRON PER 8 MG: Performed by: INTERNAL MEDICINE

## 2021-07-19 PROCEDURE — 25010000002 MORPHINE PER 10 MG: Performed by: PHYSICIAN ASSISTANT

## 2021-07-19 RX ORDER — IPRATROPIUM BROMIDE AND ALBUTEROL SULFATE 2.5; .5 MG/3ML; MG/3ML
3 SOLUTION RESPIRATORY (INHALATION)
Status: DISCONTINUED | OUTPATIENT
Start: 2021-07-19 | End: 2021-07-23 | Stop reason: HOSPADM

## 2021-07-19 RX ORDER — BUDESONIDE 0.5 MG/2ML
0.5 INHALANT ORAL
Status: DISCONTINUED | OUTPATIENT
Start: 2021-07-19 | End: 2021-07-23 | Stop reason: HOSPADM

## 2021-07-19 RX ORDER — CHOLECALCIFEROL (VITAMIN D3) 125 MCG
2.5 CAPSULE ORAL NIGHTLY PRN
Status: DISCONTINUED | OUTPATIENT
Start: 2021-07-19 | End: 2021-07-23 | Stop reason: HOSPADM

## 2021-07-19 RX ADMIN — CYCLOSPORINE 1 DROP: 0.5 EMULSION OPHTHALMIC at 09:09

## 2021-07-19 RX ADMIN — LOSARTAN POTASSIUM 100 MG: 50 TABLET, FILM COATED ORAL at 08:53

## 2021-07-19 RX ADMIN — PANTOPRAZOLE SODIUM 40 MG: 40 TABLET, DELAYED RELEASE ORAL at 08:54

## 2021-07-19 RX ADMIN — BUDESONIDE 0.5 MG: 0.5 SUSPENSION RESPIRATORY (INHALATION) at 07:55

## 2021-07-19 RX ADMIN — IPRATROPIUM BROMIDE AND ALBUTEROL SULFATE 3 ML: 2.5; .5 SOLUTION RESPIRATORY (INHALATION) at 19:43

## 2021-07-19 RX ADMIN — HYDRALAZINE HYDROCHLORIDE 100 MG: 25 TABLET, FILM COATED ORAL at 08:53

## 2021-07-19 RX ADMIN — OXYCODONE HYDROCHLORIDE 5 MG: 5 TABLET ORAL at 03:52

## 2021-07-19 RX ADMIN — DOCUSATE SODIUM 50 MG AND SENNOSIDES 8.6 MG 2 TABLET: 8.6; 5 TABLET, FILM COATED ORAL at 08:53

## 2021-07-19 RX ADMIN — ONDANSETRON HYDROCHLORIDE 4 MG: 4 TABLET, FILM COATED ORAL at 12:28

## 2021-07-19 RX ADMIN — Medication 5000 UNITS: at 08:53

## 2021-07-19 RX ADMIN — CALCIUM CARBONATE-VITAMIN D TAB 500 MG-200 UNIT 1 TABLET: 500-200 TAB at 08:53

## 2021-07-19 RX ADMIN — METOPROLOL SUCCINATE 100 MG: 50 TABLET, EXTENDED RELEASE ORAL at 08:52

## 2021-07-19 RX ADMIN — MONTELUKAST 10 MG: 10 TABLET, FILM COATED ORAL at 21:25

## 2021-07-19 RX ADMIN — GABAPENTIN 300 MG: 300 CAPSULE ORAL at 08:53

## 2021-07-19 RX ADMIN — DIGOXIN 250 MCG: 250 TABLET ORAL at 08:54

## 2021-07-19 RX ADMIN — AZELASTINE HYDROCHLORIDE 1 SPRAY: 137 SPRAY, METERED NASAL at 08:54

## 2021-07-19 RX ADMIN — PREDNISOLONE ACETATE 1 DROP: 10 SUSPENSION/ DROPS OPHTHALMIC at 23:30

## 2021-07-19 RX ADMIN — GUAIFENESIN 600 MG: 600 TABLET, EXTENDED RELEASE ORAL at 08:53

## 2021-07-19 RX ADMIN — GABAPENTIN 300 MG: 300 CAPSULE ORAL at 21:24

## 2021-07-19 RX ADMIN — CEFEPIME 2 G: 2 INJECTION, POWDER, FOR SOLUTION INTRAVENOUS at 00:44

## 2021-07-19 RX ADMIN — Medication 250 MG: at 08:54

## 2021-07-19 RX ADMIN — HYDRALAZINE HYDROCHLORIDE 100 MG: 25 TABLET, FILM COATED ORAL at 15:58

## 2021-07-19 RX ADMIN — Medication 10 ML: at 08:52

## 2021-07-19 RX ADMIN — ESCITALOPRAM OXALATE 10 MG: 10 TABLET ORAL at 08:53

## 2021-07-19 RX ADMIN — IPRATROPIUM BROMIDE AND ALBUTEROL SULFATE 3 ML: 2.5; .5 SOLUTION RESPIRATORY (INHALATION) at 15:30

## 2021-07-19 RX ADMIN — GUAIFENESIN 600 MG: 600 TABLET, EXTENDED RELEASE ORAL at 21:25

## 2021-07-19 RX ADMIN — ONDANSETRON HYDROCHLORIDE 4 MG: 4 TABLET, FILM COATED ORAL at 21:24

## 2021-07-19 RX ADMIN — ALBUTEROL SULFATE 2.5 MG: 2.5 SOLUTION RESPIRATORY (INHALATION) at 07:47

## 2021-07-19 RX ADMIN — HYDROCODONE BITARTRATE AND ACETAMINOPHEN 1 TABLET: 5; 325 TABLET ORAL at 21:24

## 2021-07-19 RX ADMIN — ONDANSETRON HYDROCHLORIDE 4 MG: 4 TABLET, FILM COATED ORAL at 06:17

## 2021-07-19 RX ADMIN — IPRATROPIUM BROMIDE AND ALBUTEROL SULFATE 3 ML: 2.5; .5 SOLUTION RESPIRATORY (INHALATION) at 11:55

## 2021-07-19 RX ADMIN — HYDROCODONE BITARTRATE AND ACETAMINOPHEN 1 TABLET: 5; 325 TABLET ORAL at 06:13

## 2021-07-19 RX ADMIN — AZELASTINE HYDROCHLORIDE 1 SPRAY: 137 SPRAY, METERED NASAL at 21:24

## 2021-07-19 RX ADMIN — ASPIRIN 325 MG ORAL TABLET 325 MG: 325 PILL ORAL at 08:53

## 2021-07-19 RX ADMIN — DOCUSATE SODIUM 50 MG AND SENNOSIDES 8.6 MG 2 TABLET: 8.6; 5 TABLET, FILM COATED ORAL at 21:24

## 2021-07-19 RX ADMIN — NIFEDIPINE 60 MG: 30 TABLET, FILM COATED, EXTENDED RELEASE ORAL at 08:53

## 2021-07-19 RX ADMIN — OXYCODONE HYDROCHLORIDE 5 MG: 5 TABLET ORAL at 14:22

## 2021-07-19 RX ADMIN — CEFEPIME 2 G: 2 INJECTION, POWDER, FOR SOLUTION INTRAVENOUS at 08:52

## 2021-07-19 RX ADMIN — CEFEPIME 2 G: 2 INJECTION, POWDER, FOR SOLUTION INTRAVENOUS at 17:00

## 2021-07-19 RX ADMIN — IPRATROPIUM BROMIDE 0.5 MG: 0.5 SOLUTION RESPIRATORY (INHALATION) at 07:46

## 2021-07-19 RX ADMIN — CYCLOSPORINE 1 DROP: 0.5 EMULSION OPHTHALMIC at 21:24

## 2021-07-19 RX ADMIN — Medication 10 ML: at 21:27

## 2021-07-19 RX ADMIN — ATORVASTATIN CALCIUM 20 MG: 20 TABLET, FILM COATED ORAL at 08:54

## 2021-07-19 RX ADMIN — LEVOTHYROXINE SODIUM 25 MCG: 0.03 TABLET ORAL at 06:13

## 2021-07-19 RX ADMIN — GABAPENTIN 300 MG: 300 CAPSULE ORAL at 15:58

## 2021-07-19 RX ADMIN — FUROSEMIDE 10 MG: 20 TABLET ORAL at 08:53

## 2021-07-19 RX ADMIN — MORPHINE SULFATE 4 MG: 4 INJECTION INTRAVENOUS at 00:44

## 2021-07-19 RX ADMIN — BUDESONIDE 0.5 MG: 0.5 SUSPENSION RESPIRATORY (INHALATION) at 19:43

## 2021-07-19 RX ADMIN — HYDRALAZINE HYDROCHLORIDE 100 MG: 25 TABLET, FILM COATED ORAL at 21:25

## 2021-07-19 NOTE — SIGNIFICANT NOTE
CT PE protocol followed which showed no pulmonary embolism though a moderate to large right pleural effusion along with a small left-sided pleural effusion was noted along with some pulmonary edema and alveolar consolidation in the right lower lobe possibly representing a right lower lobe pneumonia or atelectasis along with severe atherosclerotic disease, coronary artery calcifications, hepatosplenomegaly with hepatic steatosis and emphysema.  Serum creatinine and BUN are within normal limits and patient is noted as hypertensive with a blood pressure of 187/61, 20 mg Lasix ordered with close monitoring of I's and O's and daily weights ordered as well.  We will also check echocardiogram in a.m.

## 2021-07-19 NOTE — CONSULTS
PULMONARY CRITICAL CARE CONSULT NOTE      PATIENT IDENTIFICATION:  Name: Gali Dover  MRN: [id number]  :  1945     Age: 75 y.o.  Sex: female        DATE OF CONSULTATION:  2021  PRIMARY CARE PHYSICIAN    Chris Pedro MD                  CHIEF COMPLAINT: *Shortness of breath    History of Present Illness:   Gali Dover is a 75 y.o. female with recent CAROTID SUBCLAVIAN BYPASS last month, ex-smoker, history of COPD, presented with worsening shortness of breath over the last week, shallow breathing and shallow coughing feeling some tightness in her chest no hemoptysis denies any fever no chills no nausea no vomiting she thinks she is losing weight and decreased appetite      Review of Systems:   Constitutional:  As above   Eyes: negative   ENT/oropharynx: negative   Cardiovascular: negative   Respiratory:  As above   Gastrointestinal: negative   Genitourinary: negative   Neurological: negative   Musculoskeletal: negative   Integument/breast: negative   Endocrine: negative   Allergic/Immunologic: negative     Past Medical History:  Past Medical History:   Diagnosis Date   • Anxiety    • Arteritis (CMS/[facility])    • Asthma    • Constipation     off and on   • COPD (chronic obstructive pulmonary disease) (CMS/HCC)    • Disease of thyroid gland    • Disorder of left eye    • Dry eyes    • GERD (gastroesophageal reflux disease)    • Hyperlipidemia    • Hypertension    • 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     Social History:   Social History     Tobacco Use   • Smoking status: Former Smoker   Substance Use Topics   • Alcohol use: Yes     Comment: social        Allergies:  Allergies   Allergen Reactions   • Crestor [Rosuvastatin] Myalgia   • Lipitor [Atorvastatin] Myalgia   • Penicillins Hives   • Hctz [Hydrochlorothiazide] Rash       Home Meds:  Medications Prior to Admission   Medication Sig Dispense Refill Last Dose   • aspirin 325 MG tablet Take 325 mg by mouth Daily.   7/17/2021 at Unknown time   • azelastine (ASTELIN) 0.1 % nasal spray 1 spray into the nostril(s) as directed by provider 2 (Two) Times a Day. Use in each nostril as directed   7/17/2021 at Unknown time   • calcium carbonate (OS-VANDANA) 600 MG tablet Take 600 mg by mouth Daily.   7/17/2021 at Unknown time   • Cholecalciferol (Vitamin D) 50 MCG (2000 UT) capsule Take 2,000 Units by mouth Daily. A   7/17/2021 at Unknown time   • cycloSPORINE (RESTASIS) 0.05 % ophthalmic emulsion Administer 1 drop to both eyes 2 (Two) Times a Day.   7/17/2021 at Unknown time   • digoxin (LANOXIN) 250 MCG tablet Take 250 mcg by mouth every night at bedtime.   7/17/2021 at Unknown time   • escitalopram (LEXAPRO) 10 MG tablet Take 10 mg by mouth Every Morning.   7/17/2021 at Unknown time   • ezetimibe (Zetia) 10 MG tablet Take 10 mg by mouth every night at bedtime.   7/17/2021 at Unknown time   • fluticasone (FLONASE) 50 MCG/ACT nasal spray 2 sprays into the nostril(s) as directed by provider Every Morning.   7/17/2021 at Unknown time   • furosemide (LASIX) 20 MG tablet Take 10 mg by mouth Every Morning.   7/17/2021 at Unknown time   • gabapentin (NEURONTIN) 300 MG capsule Take 300 mg by mouth 3 (Three) Times a Day.   7/17/2021 at Unknown time   • guaiFENesin (MUCINEX) 600 MG 12 hr tablet Take 1 tablet by mouth Every 12 (Twelve) Hours. 30 tablet 0 7/17/2021 at Unknown time   • hydrALAZINE  (APRESOLINE) 100 MG tablet Take 100 mg by mouth 3 (Three) Times a Day. Take dos   7/17/2021 at Unknown time   • HYDROcodone-acetaminophen (NORCO) 5-325 MG per tablet Take 1 tablet by mouth Every 4 (Four) Hours As Needed for Severe Pain . 10 tablet 0 7/17/2021 at Unknown time   • levothyroxine (SYNTHROID, LEVOTHROID) 25 MCG tablet Take 25 mcg by mouth Every Morning.   7/17/2021 at Unknown time   • losartan (COZAAR) 100 MG tablet Take 100 mg by mouth Every Morning.   7/17/2021 at Unknown time   • loteprednol (LOTEMAX) 0.5 % ophthalmic suspension Administer 1 drop into the left eye every night at bedtime.   7/17/2021 at Unknown time   • metoprolol succinate XL (TOPROL-XL) 100 MG 24 hr tablet Take 100 mg by mouth Every Morning.   7/17/2021 at Unknown time   • montelukast (SINGULAIR) 10 MG tablet Take 10 mg by mouth Every Night.   7/17/2021 at Unknown time   • NIFEdipine XL (PROCARDIA XL) 60 MG 24 hr tablet Take 60 mg by mouth 2 (two) times a day.   7/17/2021 at Unknown time   • omeprazole (PrilOSEC) 20 MG capsule Take 20 mg by mouth Every Morning.   7/17/2021 at Unknown time   • simvastatin (ZOCOR) 40 MG tablet Take 40 mg by mouth Every Night.   7/17/2021 at Unknown time   • tiotropium (SPIRIVA) 18 MCG per inhalation capsule Place 1 capsule into inhaler and inhale Every Morning. Use and bring dos   7/17/2021 at Unknown time   • Tocilizumab (Actemra) 162 MG/0.9ML solution prefilled syringe Inject 1 syringe under the skin into the appropriate area as directed 1 (One) Time Per Week. Monday   Past Week at Unknown time   • albuterol sulfate  (90 Base) MCG/ACT inhaler Inhale 2 puffs Every 4 (Four) Hours As Needed for Wheezing.      • budesonide-formoterol (SYMBICORT) 160-4.5 MCG/ACT inhaler Inhale 2 puffs 2 (Two) Times a Day. Use and bring dos (Patient not taking: Reported on 7/18/2021)   Not Taking at Unknown time   • immune globulin, human, (GAMMAGARD) 1 GM/10ML solution infusion Infuse  into a venous catheter 1  "(One) Time.      • ondansetron (Zofran) 4 MG tablet Take 1 tablet by mouth Every 6 (Six) Hours As Needed for Nausea or Vomiting. 15 tablet 0    • Probiotic Product (PROBIOTIC PO) Take 1 capsule by mouth Daily.          Objective:  tMax 24 hrs: Temp (24hrs), Av.8 °F (37.1 °C), Min:98.5 °F (36.9 °C), Max:99 °F (37.2 °C)      Vitals Ranges:   Temp:  [98.5 °F (36.9 °C)-99 °F (37.2 °C)] 98.5 °F (36.9 °C)  Heart Rate:  [75-88] 77  Resp:  [18] 18  BP: (129-187)/(38-61) 129/60    Intake and Output Last 3 Shifts:   No intake/output data recorded.    Exam:  /60 (BP Location: Left arm, Patient Position: Lying)   Pulse 77   Temp 98.5 °F (36.9 °C) (Oral)   Resp 18   Ht 170.2 cm (67.01\")   Wt 76.9 kg (169 lb 8.5 oz)   SpO2 94%   BMI 26.55 kg/m²       21  1347 21  1922   Weight: 78 kg (171 lb 15.3 oz) 76.9 kg (169 lb 8.5 oz)     General Appearance:      HEENT:  Normocephalic, without obvious abnormality, atraumaticConjunctiva/corneas clear,.  Normal external ear canals, Nares normal, no drainage  Neck:  Supple, symmetrical, trachea midline. No JVD.  Lungs /Chest wall:   Bilateral basal rhonchi, respirations unlabored symmetrical wall movement.     Heart:  Regular rate and rhythm, systolic murmur PMI left sternal border  Abdomen: Soft, non-tender, no masses, no organomegaly.    Extremities: Trace edema no clubbing or Cyanosis        Data Review:  All labs (24hrs):   Recent Results (from the past 24 hour(s))   Basic Metabolic Panel    Collection Time: 21  2:32 PM    Specimen: Blood   Result Value Ref Range    Glucose 101 (H) 65 - 99 mg/dL    BUN 8 8 - 23 mg/dL    Creatinine 0.60 0.57 - 1.00 mg/dL    Sodium 135 (L) 136 - 145 mmol/L    Potassium 4.0 3.5 - 5.2 mmol/L    Chloride 100 98 - 107 mmol/L    CO2 23.0 22.0 - 29.0 mmol/L    Calcium 8.9 8.6 - 10.5 mg/dL    eGFR Non African Amer 97 >60 mL/min/1.73    BUN/Creatinine Ratio 13.3 7.0 - 25.0    Anion Gap 12.0 5.0 - 15.0 mmol/L   Protime-INR    " Collection Time: 07/18/21  2:32 PM    Specimen: Blood   Result Value Ref Range    Protime 10.9 9.6 - 11.7 Seconds    INR 0.98 0.93 - 1.10   aPTT    Collection Time: 07/18/21  2:32 PM    Specimen: Blood   Result Value Ref Range    PTT 21.1 (L) 24.0 - 31.0 seconds   Troponin    Collection Time: 07/18/21  2:32 PM    Specimen: Blood   Result Value Ref Range    Troponin T 0.020 0.000 - 0.030 ng/mL   BNP    Collection Time: 07/18/21  2:32 PM    Specimen: Blood   Result Value Ref Range    proBNP 644.1 0.0-1,800.0 pg/mL   Respiratory Panel PCR w/COVID-19(SARS-CoV-2) JENNIFER/BRIAN/IFRAH/PAD/COR/MAD/CATHERINE In-House, NP Swab in [facility], 3-4 HR TAT - Swab, Nasopharynx    Collection Time: 07/18/21  2:32 PM    Specimen: Nasopharynx; Swab   Result Value Ref Range    ADENOVIRUS, PCR Not Detected Not Detected    Coronavirus 229E Not Detected Not Detected    Coronavirus HKU1 Not Detected Not Detected    Coronavirus NL63 Not Detected Not Detected    Coronavirus OC43 Not Detected Not Detected    COVID19 Not Detected Not Detected - Ref. Range    Human Metapneumovirus Not Detected Not Detected    Human Rhinovirus/Enterovirus Not Detected Not Detected    Influenza A PCR Not Detected Not Detected    Influenza B PCR Not Detected Not Detected    Parainfluenza Virus 1 Not Detected Not Detected    Parainfluenza Virus 2 Not Detected Not Detected    Parainfluenza Virus 3 Not Detected Not Detected    Parainfluenza Virus 4 Not Detected Not Detected    RSV, PCR Not Detected Not Detected    Bordetella pertussis pcr Not Detected Not Detected    Bordetella parapertussis PCR Not Detected Not Detected    Chlamydophila pneumoniae PCR Not Detected Not Detected    Mycoplasma pneumo by PCR Not Detected Not Detected   CBC Auto Differential    Collection Time: 07/18/21  2:32 PM    Specimen: Blood   Result Value Ref Range    WBC 7.20 3.40 - 10.80 10*3/mm3    RBC 3.30 (L) 3.77 - 5.28 10*6/mm3    Hemoglobin 9.5 (L) 12.0 - 15.9 g/dL    Hematocrit 28.7 (L) 34.0 - 46.6 %     MCV 86.9 79.0 - 97.0 fL    MCH 28.8 26.6 - 33.0 pg    MCHC 33.2 31.5 - 35.7 g/dL    RDW 14.6 12.3 - 15.4 %    RDW-SD 44.2 37.0 - 54.0 fl    MPV 8.4 6.0 - 12.0 fL    Platelets 410 140 - 450 10*3/mm3   Manual Differential    Collection Time: 07/18/21  2:32 PM    Specimen: Blood   Result Value Ref Range    Neutrophil % 65.0 42.7 - 76.0 %    Lymphocyte % 22.0 19.6 - 45.3 %    Monocyte % 6.0 5.0 - 12.0 %    Eosinophil % 4.0 0.3 - 6.2 %    Bands %  1.0 0.0 - 5.0 %    Metamyelocyte % 2.0 (H) 0.0 - 0.0 %    Neutrophils Absolute 4.75 1.70 - 7.00 10*3/mm3    Lymphocytes Absolute 1.58 0.70 - 3.10 10*3/mm3    Monocytes Absolute 0.43 0.10 - 0.90 10*3/mm3    Eosinophils Absolute 0.29 0.00 - 0.40 10*3/mm3    RBC Morphology Normal Normal    WBC Morphology Normal Normal    Platelet Estimate Adequate Normal    Large Platelets Slight/1+ None Seen    Giant Platelets Slight/1+ None Seen   Digoxin Level    Collection Time: 07/18/21  2:32 PM    Specimen: Blood   Result Value Ref Range    Digoxin 1.40 (H) 0.60 - 1.20 ng/mL   D-dimer, Quantitative    Collection Time: 07/18/21  2:32 PM    Specimen: Blood   Result Value Ref Range    D-Dimer, Quantitative 1.94 (H) 0.00 - 0.59 mg/L (FEU)   Basic Metabolic Panel    Collection Time: 07/19/21  3:49 AM    Specimen: Blood   Result Value Ref Range    Glucose 87 65 - 99 mg/dL    BUN 6 (L) 8 - 23 mg/dL    Creatinine 0.62 0.57 - 1.00 mg/dL    Sodium 137 136 - 145 mmol/L    Potassium 3.9 3.5 - 5.2 mmol/L    Chloride 103 98 - 107 mmol/L    CO2 26.0 22.0 - 29.0 mmol/L    Calcium 8.2 (L) 8.6 - 10.5 mg/dL    eGFR Non African Amer 94 >60 mL/min/1.73    BUN/Creatinine Ratio 9.7 7.0 - 25.0    Anion Gap 8.0 5.0 - 15.0 mmol/L   CBC Auto Differential    Collection Time: 07/19/21  3:49 AM    Specimen: Blood   Result Value Ref Range    WBC 9.10 3.40 - 10.80 10*3/mm3    RBC 2.67 (L) 3.77 - 5.28 10*6/mm3    Hemoglobin 7.9 (L) 12.0 - 15.9 g/dL    Hematocrit 23.1 (L) 34.0 - 46.6 %    MCV 86.7 79.0 - 97.0 fL    MCH 29.6  26.6 - 33.0 pg    MCHC 34.1 31.5 - 35.7 g/dL    RDW 14.4 12.3 - 15.4 %    RDW-SD 43.8 37.0 - 54.0 fl    MPV 8.0 6.0 - 12.0 fL    Platelets 356 140 - 450 10*3/mm3    Neutrophil % 74.9 42.7 - 76.0 %    Lymphocyte % 10.5 (L) 19.6 - 45.3 %    Monocyte % 6.7 5.0 - 12.0 %    Eosinophil % 7.0 (H) 0.3 - 6.2 %    Basophil % 0.9 0.0 - 1.5 %    Neutrophils, Absolute 6.90 1.70 - 7.00 10*3/mm3    Lymphocytes, Absolute 1.00 0.70 - 3.10 10*3/mm3    Monocytes, Absolute 0.60 0.10 - 0.90 10*3/mm3    Eosinophils, Absolute 0.60 (H) 0.00 - 0.40 10*3/mm3    Basophils, Absolute 0.10 0.00 - 0.20 10*3/mm3    nRBC 0.1 0.0 - 0.2 /100 WBC   Magnesium    Collection Time: 07/19/21  3:49 AM    Specimen: Blood   Result Value Ref Range    Magnesium 1.7 1.6 - 2.4 mg/dL   Digoxin Level    Collection Time: 07/19/21  3:49 AM    Specimen: Blood   Result Value Ref Range    Digoxin 1.00 0.60 - 1.20 ng/mL        Imaging:  [unfilled]    ASSESSMENT:  Acute respiratory distress with hypoxia  Significant right pleural effusion  Lung infiltrate possible pneumonia   Paroxysmal atrial fibrillation (CMS/HCC)    GERD (gastroesophageal reflux disease)    Hyperlipidemia    Essential hypertension    Polymyalgia     Hypothyroid    COPD (chronic obstructive pulmonary disease) with chronic bronchitis (CMS/HCC)      PLAN:  Arrange for thoracentesis later today  Encouraged to use I-S flutter valve  Continue  Antibiotics  Bronchodilator  Inhaled corticosteroids  Electrolytes/ glycemic control  DVT and GI prophylaxis.  Total Critical care time in direct medical management (   ) minutes       William Cai MD. D, ABSM.  7/19/2021  07:47 EDT

## 2021-07-19 NOTE — PLAN OF CARE
Goal Outcome Evaluation:  Plan of Care Reviewed With: patient        Progress: no change  Outcome Summary: Pt had difficulty sleeping d/t pain control. Pain medications continued in attempt to alieviate/manage pain. No other complaints at this time. Pt to have echo today. Will continue to monitor.

## 2021-07-19 NOTE — DISCHARGE PLACEMENT REQUEST
"Gali Cortes (75 y.o. Female)     Date of Birth Social Security Number Address Home Phone MRN    1945  [address] [place] [facility] [phone] [id number]    Orthodox Marital Status          None        Admission Date Admission Type Admitting Provider Attending Provider Department, Room/Bed    7/18/21 Emergency Amrit Ramsey MD Olisa, Charles O, MD [facility] 2C MEDICAL INPATIENT, 245/1    Discharge Date Discharge Disposition Discharge Destination                       Attending Provider: Amrit Ramsey MD    Allergies: Crestor [Rosuvastatin], Lipitor [Atorvastatin], Penicillins, Hctz [Hydrochlorothiazide]    Isolation: None   Infection: COVID (rule out) (07/18/21)   Code Status: CPR    Ht: 170.2 cm (67.01\")   Wt: 76.9 kg (169 lb 8.5 oz)    Admission Cmt: None   Principal Problem: None                Active Insurance as of 7/18/2021     Primary Coverage     Payor Plan Insurance Group Employer/Plan Group    MEDICARE MEDICARE A & B      Payor Plan Address Payor Plan Phone Number Payor Plan Fax Number Effective Dates    [address] [phone]  7/1/2009 - None Entered    [facility] [id number]       Subscriber Name Subscriber Birth Date Member ID       GALI CORTES 1945 [id number]           Secondary Coverage     Payor Plan Insurance Group Employer/Plan Group    [employer]           PLAN N     Payor Plan Address Payor Plan Phone Number Payor Plan Fax Number Effective Dates    [address] [address]   1/1/2018 - None Entered    [facility] [address]       Subscriber Name Subscriber Birth Date Member ID       GALI CORTES 1945 [id number]                 Emergency Contacts      (Rel.) Home Phone Work Phone Mobile Phone    Les Cortes (Son) -- -- [phone]            "

## 2021-07-19 NOTE — PROGRESS NOTES
[facility] Medicine Services Daily Progress Note    Patient Name: Gali Dover  : 1945  MRN: [id number]  Primary Care Physician:  Chris Pedro MD  Date of admission: 2021      Subjective      Chief Complaint: Shortness of breath.      Patient Reports overnight events.    Review of Systems   Constitutional: Negative.   HENT: Negative.    Eyes: Negative.    Cardiovascular: Negative.    Respiratory: Negative.    Endocrine: Negative.    Hematologic/Lymphatic: Negative.    Skin: Negative.    Musculoskeletal: Positive for back pain.   Gastrointestinal: Negative.    Genitourinary: Negative.    Neurological: Negative.    Psychiatric/Behavioral: Negative.    Allergic/Immunologic: Negative.           Objective      Vitals:   Temp:  [98.5 °F (36.9 °C)-98.9 °F (37.2 °C)] 98.5 °F (36.9 °C)  Heart Rate:  [67-88] 67  Resp:  [17-18] 18  BP: (129-187)/(38-61) 129/60  Flow (L/min):  [2] 2    Physical Exam  Vitals and nursing note reviewed.   Constitutional:       General: She is not in acute distress.     Appearance: Normal appearance.   HENT:      Head: Normocephalic and atraumatic.      Nose: Nose normal. No congestion or rhinorrhea.      Mouth/Throat:      Mouth: Mucous membranes are moist.      Pharynx: Oropharynx is clear. No oropharyngeal exudate or posterior oropharyngeal erythema.   Eyes:      Pupils: Pupils are equal, round, and reactive to light.   Cardiovascular:      Pulses: Normal pulses.      Heart sounds: Normal heart sounds. No murmur heard.   No friction rub. No gallop.       Comments: S1 and S2 present.  No tachycardia.  Pulmonary:      Effort: No respiratory distress.      Breath sounds: No wheezing, rhonchi or rales.      Comments: Decreased air entry bilaterally.  Poor air movement.  Chest:      Chest wall: No tenderness.   Abdominal:      General: Abdomen is flat. Bowel sounds are normal. There is no distension.      Palpations: Abdomen is soft.      Tenderness: There is no  right CVA tenderness.   Musculoskeletal:         General: No swelling, tenderness, deformity or signs of injury.      Cervical back: Neck supple. No tenderness.      Right lower leg: No edema.      Left lower leg: No edema.   Skin:     Capillary Refill: Capillary refill takes less than 2 seconds.      Coloration: Skin is not jaundiced.      Findings: No bruising, lesion or rash.   Neurological:      Mental Status: She is alert.      Comments: No facial asymmetry noted.  Gait and station not tested.   Psychiatric:      Comments: No agitation.               Result Review    Result Review:  I have personally reviewed the results from the time of this admission to 7/19/2021 14:48 EDT and agree with these findings:  [x]  Laboratory  [x]  Microbiology  [x]  Radiology  []  EKG/Telemetry   []  Cardiology/Vascular   []  Pathology  []  Old records  []  Other:  Most notable findings include: Hemoglobin and hematocrit are 7.9/23.1.          Assessment/Plan      Brief Patient Summary:  Patient is a 75-year-old female with past medical history of GERD, neuropathy, low serum IgG, hypertension, hyperlipidemia, left eye disorder, thyroid disease, COPD, asthma, arthritis and anxiety disorder who presented to the emergency room because of shortness of breath.  Patient's family reported that her oxygen saturation was in the 72 percent on room air.  Patient's oxygen saturation in the emergency room was reported to be in the low 80s prior to administration of oxygen therapy.  Patient reported being in the hospital a couple of weeks ago when she was tested for home oxygen and she did not qualify for home oxygen.  Patient reported having shortness of breath since she was discharged from the hospital but worse in the last 2days.  Patient also complained of cough.  Patient presented to the emergency room for further assessment.  Patient was seen in the emergency room and was diagnosed with HCAP and acute respiratory failure with  hypoxia.         aspirin, 325 mg, Oral, Daily  atorvastatin, 20 mg, Oral, Daily  azelastine, 1 spray, Each Nare, BID  budesonide, 0.5 mg, Nebulization, BID - RT  calcium 500 mg vitamin D 5 mcg (200 UT), 1 tablet, Oral, Daily  cefepime, 2 g, Intravenous, Q8H  cycloSPORINE, 1 drop, Both Eyes, BID  digoxin, 250 mcg, Oral, Daily  escitalopram, 10 mg, Oral, Daily  furosemide, 10 mg, Oral, Daily  gabapentin, 300 mg, Oral, TID  guaiFENesin, 600 mg, Oral, Q12H  hydrALAZINE, 100 mg, Oral, TID  ipratropium-albuterol, 3 mL, Nebulization, Q4H - RT  levothyroxine, 25 mcg, Oral, Q AM  losartan, 100 mg, Oral, Daily  metoprolol succinate XL, 100 mg, Oral, Daily  montelukast, 10 mg, Oral, Nightly  NIFEdipine XL, 60 mg, Oral, Q24H  pantoprazole, 40 mg, Oral, QAM   Pharmacy Consult for Antimicrobial Stewardship, , Does not apply, Once  prednisoLONE acetate, 1 drop, Left Eye, Nightly  saccharomyces boulardii, 250 mg, Oral, Daily  senna-docusate sodium, 2 tablet, Oral, BID  sodium chloride, 10 mL, Intravenous, Q12H  vitamin D3, 5,000 Units, Oral, Daily       Pharmacy Consult - Pharmacy to dose,          Active Hospital Problems:  Active Hospital Problems    Diagnosis    • HCAP (healthcare-associated pneumonia)  Follow pulmonary recommendations.  Treat with antibiotics.      • Acute respiratory failure with hypoxia (CMS/HCC)  Treat with oxygen therapy.  Follow pulmonary recommendations.      • Pneumonia due to infectious organism    • Hypothyroid  Treatment Synthroid.      • Hyperlipidemia  Treat with Lipitor.      • Polymyalgia   Treat with pain control, Tylenol.      • Paroxysmal atrial fibrillation (CMS/HCC)  Continue to monitor.      • COPD (chronic obstructive pulmonary disease) with chronic bronchitis (CMS/HCC)  Treat with nebs, albuterol.      • GERD (gastroesophageal reflux disease)  Treat with Protonix.      • Essential hypertension  Continue to monitor.      Continue appropriate patient's home medications for other chronic  medical conditions.  Continue the present level of care.  Patient and family agreed with the plan of care.      DVT prophylaxis:  Mechanical DVT prophylaxis orders are present.    CODE STATUS:    Level Of Support Discussed With: Patient  Code Status: CPR  Medical Interventions (Level of Support Prior to Arrest): Full      Disposition: Pending patient's clinical improvement.    This patient has been examined wearing appropriate Personal Protective Equipment and discussed with hospital infection control department, [facility] department, infectious disease specialist and pulmonologist. 07/19/21      Electronically signed by Amrit Ramsey MD, 07/19/21, 14:48 EDT.  [facility] Hospitalist Team

## 2021-07-19 NOTE — PROCEDURES
Thoracentesis procedure     Name: Gali Dover  MRN: [id number]  :  1945  Age: 75 y.o.  Sex: female    Time of procedure: 16:25 EDT      Date of Operation: 2021     Pre-op Diagnosis: Pleural effusion  Site: Right pleural effusion  Post-op Diagnosis: same    Surgeon: William Cai    Sample: Pleural fluids  Procedure: X-ray was reviewed, informed consent was obtained, Patient was sitting, physical exam used to localize the fluid then ultrasound used to localize the fluids which was at the 10th intercostal space posterior axillary line (right side) side , preparing the site with chlorhexidine, then lidocaine 1% 5 mL used as a local anesthesia for the skin tissue above the rib, pleura, then into the pleural space then yellow fluid was draining, half centimeters incision made, a drainage catheter was the guidewire passed above the rib edge, into the pleural space, yellow fluid draining total amount (900) , patient tolerated the procedure very well no active complication chest x-ray to follow fluid was sent out to the lab.     William Cai MD. D, ABSM.  2021  16:25 EDT

## 2021-07-19 NOTE — OUTREACH NOTE
General Surgery Week 3 Survey      Responses   [facility] patient discharged from?  [facility]   Does the patient have one of the following disease processes/diagnoses(primary or secondary)?  General Surgery   Week 3 attempt successful?  No   Revoke  Readmitted          Shila Ware RN

## 2021-07-19 NOTE — CASE MANAGEMENT/SOCIAL WORK
Case Management Readmission Assessment Note       Case Management Readmission Assessment (all recorded)      Readmission Interview     Row Name 07/19/21 1533             Readmission Indications    Is this hospitalization related to the prior hospital diagnosis?  Yes      What was the reason you were admitted?  Patient was admitted 6/30-7/5 with carotid stenosis. She had carotid bypass surgery and was also tc for copd exacerbation. She dc home with [facility]. On 7/18 she presented to ER with dyspnea and is admitted with pna      [facility] Name 07/19/21 1533             Recommendation for rehospitalization    Did you speak with your physician prior to coming to the hospital  No      Did you seek care elsewhere prior to coming to the hospital?  No      [facility] Name 07/19/21 1533             Follow up appointment    Do you have a PCP?  Yes      Did you have an appointment with PCP/specialist after hospitalization within 7 days?  Yes      Did you keep your appointment?  Yes      Row Name 07/19/21 1533             Medications    Did you have newly prescribed medications at discharge?  Yes      Did you understand the reasons for your medications at discharge and how to take them?  Yes      Did you understand the side effects of your medications?  Yes      Are you taking all of you prescribed medications?  Yes      Row Name 07/19/21 1533             Discharge Instructions    Did you understand your discharge instructions?  Yes      Did your family/caregiver hear your instructions?  No      Were you told to eat a special diet?  No      Were you given a number of someone to call if you had questions or concerns?  Yes      [facility] Name 07/19/21 1533             Index discharge location/services    Where did you go upon discharge?  Home with services      Do you have supportive family or friends in the home?  Yes      What services were arranged at discharge?  Home Health      Row Name 07/19/21 1533             Discharge  Readiness    On a scale of 1-5 (5 being well prepared), how ready were you for discharge  3      Recommendation based on interview  Education on diagnosis/self management;Additional caregiver support;Goals of care discussion/advanced care planning         Met with patient in room wearing PPE: mask, face shield/goggles, gloves, gown.      Maintained distance greater than six feet and spent less than 15 minutes in the room.      Noemy Rhoades RN  Complex Case Manager  [facility] Care Coordination  [phone]-cell  [phone]-office  [phone]-fax  [email or web address]

## 2021-07-19 NOTE — PLAN OF CARE
Goal Outcome Evaluation:  Patient is alert and oriented, able to make needs known. Patient has no complaints of pain at this time. Will continue to monitor.   Problem: Adult Inpatient Plan of Care  Goal: Plan of Care Review  Outcome: Ongoing, Progressing

## 2021-07-19 NOTE — CASE MANAGEMENT/SOCIAL WORK
Discharge Planning Assessment   [facility]     Patient Name: Gali Dover  MRN: [id number]  Today's Date: 7/19/2021    Admit Date: 7/18/2021    Discharge Needs Assessment     Row Name 07/19/21 1130       Living Environment    Lives With  alone    Current Living Arrangements  home/apartment/condo    Primary Care Provided by  self    Provides Primary Care For  no one    Family Caregiver if Needed  none       Resource/Environmental Concerns    Transportation Concerns  car, none       Transition Planning    Patient/Family Anticipates Transition to  home    Patient/Family Anticipated Services at Transition  home health care    Transportation Anticipated  family or friend will provide       Discharge Needs Assessment    Readmission Within the Last 30 Days  other (see comments) HCAP    Current Outpatient/Agency/Support Group  homecare agency    Equipment Currently Used at Home  walker, rolling    Concerns to be Addressed  discharge planning    Anticipated Changes Related to Illness  inability to care for self    Equipment Needed After Discharge  none    Provided Post Acute Provider List?  N/A    Provided Post Acute Provider Quality & Resource List?  N/A    Patient's Choice of Community Agency(s)  Patient is current with [facility] and states she wants to resume home health with them. Yasir Fisher notified. DCP sent and resume order received.    Discharge Coordination/Progress  Spoke to patient at bedside. PCP and pharmacy confirmed. See also above        Discharge Plan     Row Name 07/19/21 1138       Plan    Plan  Patient is current with [facility] and states she wants to resume home health with them. Yasir Fisher notified. DCP sent and resume order received.    Plan Comments  see assessment notes        Continued Care and Services - Admitted Since 7/18/2021     Home Medical Care     Service Provider Request Status Selected Services Address Phone Fax Patient Preferred    [facility]  Accepted N/A [phone]  [facility] [place] [address] [phone] -- --       Internal Comment last updated by Maty Rhoades RN 7/19/2021 0911    resume                       Selected Continued Care - Prior Encounters Includes selections from prior encounters from 4/19/2021 to 7/19/2021    Discharged on 7/5/2021 Admission date: 6/30/2021 - Discharge disposition: Home-Health Care [facility]    Home Medical Care     Service Provider Selected Services Address Phone Fax Patient Preferred    [facility] [address] [place] [address] [place] [id number] [phone] -- --                      Demographic Summary     Row Name 07/19/21 1129       General Information    Admission Type  observation    Arrived From  emergency department    Required Notices Provided  Observation Status Notice    Referral Source  admission list    Reason for Consult  discharge planning    Preferred Language  English        Functional Status     Row Name 07/19/21 1129       Functional Status    Usual Activity Tolerance  moderate    Current Activity Tolerance  moderate       Functional Status, IADL    Medications  independent    Meal Preparation  independent    Housekeeping  independent    Laundry  independent    Shopping  independent       Mental Status    General Appearance WDL  WDL       Mental Status Summary    Recent Changes in Mental Status/Cognitive Functioning  no changes            Maty Rhoades RN   Met with patient in room wearing PPE: mask, face shield/goggles, gloves, gown.      Maintained distance greater than six feet and spent less than 15 minutes in the room.      Noemy Rhoades RN  Complex Case Manager  [facility] Care Coordination  [phone]-cell  [phone]-office  [phone]-fax  [email or web address]

## 2021-07-20 PROCEDURE — 94760 N-INVAS EAR/PLS OXIMETRY 1: CPT

## 2021-07-20 PROCEDURE — 97535 SELF CARE MNGMENT TRAINING: CPT

## 2021-07-20 PROCEDURE — 97162 PT EVAL MOD COMPLEX 30 MIN: CPT

## 2021-07-20 PROCEDURE — 94799 UNLISTED PULMONARY SVC/PX: CPT

## 2021-07-20 PROCEDURE — 97165 OT EVAL LOW COMPLEX 30 MIN: CPT

## 2021-07-20 PROCEDURE — 63710000001 ONDANSETRON PER 8 MG: Performed by: INTERNAL MEDICINE

## 2021-07-20 PROCEDURE — 99233 SBSQ HOSP IP/OBS HIGH 50: CPT | Performed by: INTERNAL MEDICINE

## 2021-07-20 PROCEDURE — 25010000002 CEFEPIME PER 500 MG: Performed by: INTERNAL MEDICINE

## 2021-07-20 RX ADMIN — Medication 5000 UNITS: at 08:41

## 2021-07-20 RX ADMIN — NIFEDIPINE 60 MG: 30 TABLET, FILM COATED, EXTENDED RELEASE ORAL at 11:29

## 2021-07-20 RX ADMIN — OXYCODONE HYDROCHLORIDE 5 MG: 5 TABLET ORAL at 12:45

## 2021-07-20 RX ADMIN — CYCLOSPORINE 1 DROP: 0.5 EMULSION OPHTHALMIC at 08:41

## 2021-07-20 RX ADMIN — HYDRALAZINE HYDROCHLORIDE 100 MG: 25 TABLET, FILM COATED ORAL at 08:41

## 2021-07-20 RX ADMIN — LEVOTHYROXINE SODIUM 25 MCG: 0.03 TABLET ORAL at 06:16

## 2021-07-20 RX ADMIN — CALCIUM CARBONATE-VITAMIN D TAB 500 MG-200 UNIT 1 TABLET: 500-200 TAB at 08:41

## 2021-07-20 RX ADMIN — GABAPENTIN 300 MG: 300 CAPSULE ORAL at 20:30

## 2021-07-20 RX ADMIN — CEFEPIME 2 G: 2 INJECTION, POWDER, FOR SOLUTION INTRAVENOUS at 17:11

## 2021-07-20 RX ADMIN — ASPIRIN 325 MG ORAL TABLET 325 MG: 325 PILL ORAL at 08:41

## 2021-07-20 RX ADMIN — GABAPENTIN 300 MG: 300 CAPSULE ORAL at 17:00

## 2021-07-20 RX ADMIN — PANTOPRAZOLE SODIUM 40 MG: 40 TABLET, DELAYED RELEASE ORAL at 08:41

## 2021-07-20 RX ADMIN — IPRATROPIUM BROMIDE AND ALBUTEROL SULFATE 3 ML: 2.5; .5 SOLUTION RESPIRATORY (INHALATION) at 15:48

## 2021-07-20 RX ADMIN — BUDESONIDE 0.5 MG: 0.5 SUSPENSION RESPIRATORY (INHALATION) at 06:48

## 2021-07-20 RX ADMIN — METOPROLOL SUCCINATE 100 MG: 50 TABLET, EXTENDED RELEASE ORAL at 08:41

## 2021-07-20 RX ADMIN — HYDRALAZINE HYDROCHLORIDE 100 MG: 25 TABLET, FILM COATED ORAL at 17:00

## 2021-07-20 RX ADMIN — ONDANSETRON HYDROCHLORIDE 4 MG: 4 TABLET, FILM COATED ORAL at 12:45

## 2021-07-20 RX ADMIN — AZELASTINE HYDROCHLORIDE 1 SPRAY: 137 SPRAY, METERED NASAL at 20:32

## 2021-07-20 RX ADMIN — CYCLOSPORINE 1 DROP: 0.5 EMULSION OPHTHALMIC at 20:30

## 2021-07-20 RX ADMIN — HYDRALAZINE HYDROCHLORIDE 100 MG: 25 TABLET, FILM COATED ORAL at 20:29

## 2021-07-20 RX ADMIN — DOCUSATE SODIUM 50 MG AND SENNOSIDES 8.6 MG 2 TABLET: 8.6; 5 TABLET, FILM COATED ORAL at 20:30

## 2021-07-20 RX ADMIN — GUAIFENESIN 600 MG: 600 TABLET, EXTENDED RELEASE ORAL at 08:41

## 2021-07-20 RX ADMIN — MONTELUKAST 10 MG: 10 TABLET, FILM COATED ORAL at 20:29

## 2021-07-20 RX ADMIN — BUDESONIDE 0.5 MG: 0.5 SUSPENSION RESPIRATORY (INHALATION) at 18:45

## 2021-07-20 RX ADMIN — ESCITALOPRAM OXALATE 10 MG: 10 TABLET ORAL at 08:41

## 2021-07-20 RX ADMIN — IPRATROPIUM BROMIDE AND ALBUTEROL SULFATE 3 ML: 2.5; .5 SOLUTION RESPIRATORY (INHALATION) at 06:43

## 2021-07-20 RX ADMIN — HYDROCODONE BITARTRATE AND ACETAMINOPHEN 1 TABLET: 5; 325 TABLET ORAL at 08:46

## 2021-07-20 RX ADMIN — GABAPENTIN 300 MG: 300 CAPSULE ORAL at 08:41

## 2021-07-20 RX ADMIN — CEFEPIME 2 G: 2 INJECTION, POWDER, FOR SOLUTION INTRAVENOUS at 08:40

## 2021-07-20 RX ADMIN — GUAIFENESIN 600 MG: 600 TABLET, EXTENDED RELEASE ORAL at 20:30

## 2021-07-20 RX ADMIN — CEFEPIME 2 G: 2 INJECTION, POWDER, FOR SOLUTION INTRAVENOUS at 01:58

## 2021-07-20 RX ADMIN — Medication 250 MG: at 08:41

## 2021-07-20 RX ADMIN — AZELASTINE HYDROCHLORIDE 1 SPRAY: 137 SPRAY, METERED NASAL at 08:42

## 2021-07-20 RX ADMIN — FUROSEMIDE 10 MG: 20 TABLET ORAL at 08:41

## 2021-07-20 RX ADMIN — IPRATROPIUM BROMIDE AND ALBUTEROL SULFATE 3 ML: 2.5; .5 SOLUTION RESPIRATORY (INHALATION) at 18:51

## 2021-07-20 RX ADMIN — PREDNISOLONE ACETATE 1 DROP: 10 SUSPENSION/ DROPS OPHTHALMIC at 20:33

## 2021-07-20 RX ADMIN — DIGOXIN 250 MCG: 250 TABLET ORAL at 08:41

## 2021-07-20 RX ADMIN — IPRATROPIUM BROMIDE AND ALBUTEROL SULFATE 3 ML: 2.5; .5 SOLUTION RESPIRATORY (INHALATION) at 11:32

## 2021-07-20 RX ADMIN — HYDROCODONE BITARTRATE AND ACETAMINOPHEN 1 TABLET: 5; 325 TABLET ORAL at 20:29

## 2021-07-20 RX ADMIN — DOCUSATE SODIUM 50 MG AND SENNOSIDES 8.6 MG 2 TABLET: 8.6; 5 TABLET, FILM COATED ORAL at 08:41

## 2021-07-20 RX ADMIN — OXYCODONE HYDROCHLORIDE 5 MG: 5 TABLET ORAL at 04:25

## 2021-07-20 RX ADMIN — Medication 10 ML: at 20:33

## 2021-07-20 RX ADMIN — Medication 10 ML: at 08:41

## 2021-07-20 RX ADMIN — LOSARTAN POTASSIUM 100 MG: 50 TABLET, FILM COATED ORAL at 08:41

## 2021-07-20 RX ADMIN — ATORVASTATIN CALCIUM 20 MG: 20 TABLET, FILM COATED ORAL at 08:41

## 2021-07-20 NOTE — THERAPY EVALUATION
Patient Name: Gali Dover  : 1945    MRN: [id number]                              Today's Date: 2021       Admit Date: 2021    Visit Dx:     ICD-10-CM ICD-9-CM   1. Pneumonia due to infectious organism, unspecified laterality, unspecified part of lung  J18.9 486   2. Dyspnea, unspecified type  R06.00 786.09   3. Hypoxia  R09.02 799.02     Patient Active Problem List   Diagnosis   • Bilateral carotid artery stenosis   • Anxiety   • Asthma   • Paroxysmal atrial fibrillation (CMS/[facility])   • Depression   • GERD (gastroesophageal reflux disease)   • Hyperlipidemia   • Essential hypertension   • Polymyalgia    • Giant cell arteritis (CMS/[facility])   • Hypothyroid   • Peripheral neuropathy   • Bilateral calf pain   • COPD (chronic obstructive pulmonary disease) with chronic bronchitis (CMS/[facility])   • Dizziness on standing   • Pain in right knee   • Primary osteoarthritis of right knee   • Overweight (BMI 25.0-29.9)   • Normocytic anemia due to blood loss   • COPD exacerbation (CMS/[facility])   • HCAP (healthcare-associated pneumonia)   • Acute respiratory failure with hypoxia (CMS/[facility])   • Pneumonia due to infectious organism     Past Medical History:   Diagnosis Date   • Anxiety    • Arteritis (CMS/[facility])    • Asthma    • Constipation     off and on   • COPD (chronic obstructive pulmonary disease) (CMS/[facility])    • Disease of thyroid gland    • Disorder of left eye    • Dry eyes    • GERD (gastroesophageal reflux disease)    • Hyperlipidemia    • Hypertension    • 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    • Neuropathy    • Stricture of artery (CMS/[facility]) 2021     Past Surgical History:   Procedure Laterality Date   • CAROTID SUBCLAVIAN BYPASS Left 2021    Procedure: CAROTID SUBCLAVIAN BYPASS;  Surgeon: Navid Hassan MD;  Location: [facility];  Service: Vascular;  Laterality: Left;   •  SECTION     • EYE SURGERY      cat ext   • HARDWARE REMOVAL Right     knee   • HYSTERECTOMY      still has ovaries   • KIDNEY SURGERY       stent placed    • KNEE ARTHROSCOPY Right    • LAPAROSCOPIC CHOLECYSTECTOMY       General Information     [facility] Name 07/20/21 1405          Physical Therapy Time and Intention    Document Type  evaluation  -     Mode of Treatment  individual therapy;physical therapy  -[facility] Name 07/20/21 1405          General Information    Patient Profile Reviewed  yes  -EL     Prior Level of Function  independent:;ADL's;all household mobility  -[facility] Name 07/20/21 1405          Living Environment    Lives With  alone  -EL     Row Name 07/20/21 1405          Home Main Entrance    Number of Stairs, Main Entrance  three  -EL     Stair Railings, Main Entrance  railings safe and in good condition  -[facility] Name 07/20/21 1405          Stairs Within Home, Primary    Number of Stairs, Within Home, Primary  none  -EL     Row Name 07/20/21 1405          Cognition    Orientation Status (Cognition)  oriented x 3  -EL     Row Name 07/20/21 1405          Safety Issues, Functional Mobility    Impairments Affecting Function (Mobility)  balance;strength;endurance/activity tolerance  -       User Key  (r) = Recorded By, (t) = Taken By, (c) = Cosigned By    Initials Name Provider Type    EL Kem Ortega, PT Physical Therapist        Mobility     Row Name 07/20/21 1414          Bed Mobility    Bed Mobility  supine-sit  -EL     Supine-Sit [place] (Bed Mobility)  minimum assist (75% patient effort)  -EL     Assistive Device (Bed Mobility)  bed rails  -EL     Row Name 07/20/21 1414          Bed-Chair Transfer    Bed-Chair [place] (Transfers)  minimum assist (75% patient effort)  -EL     Assistive Device (Bed-Chair Transfers)  walker, front-wheeled  -EL     Row Name 07/20/21 1414          Sit-Stand Transfer    Sit-Stand [place] (Transfers)  minimum assist (75% patient effort)  -EL     Assistive Device (Sit-Stand Transfers)  walker, front-wheeled  -       User Key  (r) = Recorded By, (t) = Taken By, (c) = Cosigned By     Initials Name Provider Type    Kem Demarco, PT Physical Therapist        Obj/Interventions     Row Name 07/20/21 1415          Range of Motion Comprehensive    General Range of Motion  bilateral lower extremity ROM WFL  -EL     Row Name 07/20/21 1415          Strength Comprehensive (MMT)    General Manual Muscle Testing (MMT) Assessment  lower extremity strength deficits identified  -EL     Comment, General Manual Muscle Testing (MMT) Assessment  RLE 3+/5, LLE 4-/5  -EL     Row Name 07/20/21 1415          Balance    Balance Assessment  sitting static balance;standing static balance;standing dynamic balance  -EL     Static Sitting Balance  WFL  -EL     Static Standing Balance  mild impairment  -EL     Dynamic Standing Balance  mild impairment  -EL     Row Name 07/20/21 1415          Sensory Assessment (Somatosensory)    Sensory Assessment (Somatosensory)  sensation intact  -EL       User Key  (r) = Recorded By, (t) = Taken By, (c) = Cosigned By    Initials Name Provider Type    Kem Demarco, PT Physical Therapist        Goals/Plan     Row Name 07/20/21 1423          Bed Mobility Goal 1 (PT)    Activity/Assistive Device (Bed Mobility Goal 1, PT)  bed mobility activities, all  -EL     [place] Level/Cues Needed (Bed Mobility Goal 1, PT)  modified independence  -EL     Time Frame (Bed Mobility Goal 1, PT)  long term goal (LTG);2 weeks  -EL     [facility] Name 07/20/21 1423          Transfer Goal 1 (PT)    Activity/Assistive Device (Transfer Goal 1, PT)  transfers, all;walker, rolling  -EL     [place] Level/Cues Needed (Transfer Goal 1, PT)  modified independence  -EL     Time Frame (Transfer Goal 1, PT)  long term goal (LTG);2 weeks  -EL     [facility] Name 07/20/21 1423          Gait Training Goal 1 (PT)    Activity/Assistive Device (Gait Training Goal 1, PT)  gait (walking locomotion);walker, rolling  -EL     [place] Level (Gait Training Goal 1, PT)  modified independence  -EL     Distance (Gait Training Goal 1,  PT)  120  -EL     Time Frame (Gait Training Goal 1, PT)  long term goal (LTG);2 weeks  -EL       User Key  (r) = Recorded By, (t) = Taken By, (c) = Cosigned By    Initials Name Provider Type    Kem Demarco, PT Physical Therapist        Clinical Impression     Row Name 07/20/21 1418          Pain    Additional Documentation  Pain Scale: FACES Pre/Post-Treatment (Group)  -EL     Row Name 07/20/21 1418          Pain Scale: FACES Pre/Post-Treatment    Pain: FACES Scale, Pretreatment  2-->hurts little bit  -EL     Posttreatment Pain Rating  2-->hurts little bit  -EL     Pain Location - Orientation  generalized  -EL     Row Name 07/20/21 1418          Plan of Care Review    Plan of Care Reviewed With  patient  -EL     Outcome Summary  Pt is a 74 YO F admitted with SOA, hx of COPD. Pt reports living at home alone, performs all ADLs and ambulates with RWx. Pt this date demonstrates fair functional mobility requiring MIN A with bed mobilty, and transfers with RWx. Pt this date reports feeling weaker than last admission and discussed IP rehab. Pt assisted with transfer to bedside chair using RWx, mild SOA following. Recommendaiton is IP rehab following d/c.  -EL     Row Name 07/20/21 1418          Therapy Assessment/Plan (PT)    Rehab Potential (PT)  good, to achieve stated therapy goals  -EL     Criteria for Skilled Interventions Met (PT)  yes  -EL     Predicted Duration of Therapy Intervention (PT)  until d/c  -EL     Row Name 07/20/21 1418          Vital Signs    O2 Delivery Pre Treatment  supplemental O2  -EL     O2 Delivery Intra Treatment  supplemental O2  -EL     O2 Delivery Post Treatment  supplemental O2  -EL     Pre Patient Position  Supine  -EL     Intra Patient Position  Standing  -EL     Post Patient Position  Sitting  -EL     Row Name 07/20/21 1418          Positioning and Restraints    Pre-Treatment Position  in bed  -EL     Post Treatment Position  chair  -EL     In Chair  notified nsg;sitting;call light  within reach;encouraged to call for assist;exit alarm on  -EL       User Key  (r) = Recorded By, (t) = Taken By, (c) = Cosigned By    Initials Name Provider Type    Kem Demarco PT Physical Therapist        Outcome Measures     Row Name 07/20/21 1424          How much help from another person do you currently need...    Turning from your back to your side while in flat bed without using bedrails?  3  -EL     Moving from lying on back to sitting on the side of a flat bed without bedrails?  3  -EL     Moving to and from a bed to a chair (including a wheelchair)?  3  -EL     Standing up from a chair using your arms (e.g., wheelchair, bedside chair)?  3  -EL     Climbing 3-5 steps with a railing?  2  -EL     To walk in hospital room?  2  -EL     AM-PAC 6 Clicks Score (PT)  16  -EL     Row Name 07/20/21 1424          Functional Assessment    Outcome Measure Options  AM-PAC 6 Clicks Basic Mobility (PT)  -       User Key  (r) = Recorded By, (t) = Taken By, (c) = Cosigned By    Initials Name Provider Type    Kem Demarco PT Physical Therapist                       Physical Therapy Education                 Title: PT OT SLP Therapies (Done)     Topic: Physical Therapy (Done)     Point: Mobility training (Done)     Learning Progress Summary           Patient Acceptance, E,TB, VU by  at 7/20/2021 1425                   Point: Precautions (Done)     Learning Progress Summary           Patient Acceptance, E,TB, VU by  at 7/20/2021 1425                               User Key     Initials Effective Dates Name Provider Type  06/23/20 -  Kem Ortega PT Physical Therapist PT              PT Recommendation and Plan  Planned Therapy Interventions (PT): balance training, neuromuscular re-education, bed mobility training, transfer training, gait training, patient/family education, strengthening  Plan of Care Reviewed With: patient  Outcome Summary: Pt is a 76 YO F admitted with SOA, hx of COPD. Pt reports living  at home alone, performs all ADLs and ambulates with RWx. Pt this date demonstrates fair functional mobility requiring MIN A with bed mobilty, and transfers with RWx. Pt this date reports feeling weaker than last admission and discussed IP rehab. Pt assisted with transfer to bedside chair using RWx, mild SOA following. Recommendaiton is IP rehab following d/c.     Time Calculation:   PT Charges     Row Name 07/20/21 1425             Time Calculation    Start Time  0916  -EL      Stop Time  0940  -EL      Time Calculation (min)  24 min  -EL      PT Received On  07/20/21  -EL      PT - Next Appointment  07/21/21  -EL      PT Goal Re-Cert Due Date  08/03/21  -EL        User Key  (r) = Recorded By, (t) = Taken By, (c) = Cosigned By    Initials Name Provider Type    Kem Demarco PT Physical Therapist        Therapy Charges for Today     Code Description Service Date Service Provider Modifiers Qty    56144570098 HC PT EVAL MOD COMPLEXITY 4 7/20/2021 Kem Ortega, PT GP 1          PT G-Codes  Outcome Measure Options: AM-PAC 6 Clicks Basic Mobility (PT)  AM-PAC 6 Clicks Score (PT): 16    Kem Ortega PT  7/20/2021

## 2021-07-20 NOTE — PROGRESS NOTES
[facility] Medicine Services Daily Progress Note    Patient Name: Gali Dover  : 1945  MRN: [id number]  Primary Care Physician:  Chris Pedro MD  Date of admission: 2021      Subjective      Chief Complaint: Shortness of breath.      Patient Reports no new symptoms.    Review of Systems   Constitutional: Negative.   HENT: Negative.    Eyes: Negative.    Cardiovascular: Negative.    Respiratory: Negative.    Endocrine: Negative.    Hematologic/Lymphatic: Negative.    Skin: Negative.    Gastrointestinal: Negative.    Genitourinary: Negative.    Neurological: Negative.    Psychiatric/Behavioral: Negative.    Allergic/Immunologic: Negative.           Objective      Vitals:   Temp:  [97.9 °F (36.6 °C)-99 °F (37.2 °C)] 99 °F (37.2 °C)  Heart Rate:  [62-86] 62  Resp:  [16-20] 18  BP: (130-183)/(50-65) 159/62  Flow (L/min):  [2] 2    Physical Exam  Vitals and nursing note reviewed.   Constitutional:       General: She is not in acute distress.     Appearance: Normal appearance.   HENT:      Head: Normocephalic and atraumatic.      Nose: Nose normal. No congestion or rhinorrhea.      Mouth/Throat:      Mouth: Mucous membranes are moist.      Pharynx: Oropharynx is clear. No oropharyngeal exudate or posterior oropharyngeal erythema.   Eyes:      Pupils: Pupils are equal, round, and reactive to light.   Cardiovascular:      Pulses: Normal pulses.      Heart sounds: Normal heart sounds. No murmur heard.   No friction rub. No gallop.       Comments: S1 and S2 present.  No tachycardia.  Pulmonary:      Effort: No respiratory distress.      Breath sounds: No wheezing, rhonchi or rales.      Comments: Improved air entry bilaterally.    Chest:      Chest wall: No tenderness.   Abdominal:      General: Abdomen is flat. Bowel sounds are normal. There is no distension.      Palpations: Abdomen is soft.      Tenderness: There is no right CVA tenderness.   Musculoskeletal:         General: No  swelling, tenderness, deformity or signs of injury.      Cervical back: Neck supple. No tenderness.      Right lower leg: No edema.      Left lower leg: No edema.   Skin:     Capillary Refill: Capillary refill takes less than 2 seconds.      Coloration: Skin is not jaundiced.      Findings: No bruising, lesion or rash.   Neurological:      Mental Status: She is alert.      Comments: No facial asymmetry noted.  Gait and station not tested.   Psychiatric:      Comments: No agitation.               Result Review    Result Review:  I have personally reviewed the results from the time of this admission to 7/20/2021 18:24 EDT and agree with these findings:  [x]  Laboratory  [x]  Microbiology  [x]  Radiology  []  EKG/Telemetry   []  Cardiology/Vascular   []  Pathology  []  Old records  []  Other:  Most notable findings include: Hemoglobin and hematocrit are 7.9/23.1.          Assessment/Plan      Brief Patient Summary:  Patient is a 75-year-old female with past medical history of COPD, GERD, neuropathy, low serum IgG, hypertension, hyperlipidemia, left eye disorder, thyroid disease, asthma, arthritis and anxiety disorder who presented to the emergency room because of shortness of breath.  Patient's family reported that her oxygen saturation was in the 72 percent on room air.  Patient's oxygen saturation in the emergency room was reported to be in the low 80s prior to administration of oxygen therapy.  Patient reported being in the hospital a couple of weeks ago when she was tested for home oxygen and she did not qualify for home oxygen.  Patient reported having shortness of breath since she was discharged from the hospital but worse in the last 2days.  Patient also complained of cough.  Patient presented to the emergency room for further assessment.  Patient was seen in the emergency room and was diagnosed with HCAP and acute respiratory failure with hypoxia.         aspirin, 325 mg, Oral, Daily  atorvastatin, 20 mg, Oral,  Daily  azelastine, 1 spray, Each Nare, BID  budesonide, 0.5 mg, Nebulization, BID - RT  calcium 500 mg vitamin D 5 mcg (200 UT), 1 tablet, Oral, Daily  cefepime, 2 g, Intravenous, Q8H  cycloSPORINE, 1 drop, Both Eyes, BID  digoxin, 250 mcg, Oral, Daily  escitalopram, 10 mg, Oral, Daily  furosemide, 10 mg, Oral, Daily  gabapentin, 300 mg, Oral, TID  guaiFENesin, 600 mg, Oral, Q12H  hydrALAZINE, 100 mg, Oral, TID  ipratropium-albuterol, 3 mL, Nebulization, Q4H - RT  levothyroxine, 25 mcg, Oral, Q AM  losartan, 100 mg, Oral, Daily  metoprolol succinate XL, 100 mg, Oral, Daily  montelukast, 10 mg, Oral, Nightly  NIFEdipine XL, 60 mg, Oral, Q24H  pantoprazole, 40 mg, Oral, QAM AC  Pharmacy Consult for Antimicrobial Stewardship, , Does not apply, Once  prednisoLONE acetate, 1 drop, Left Eye, Nightly  saccharomyces boulardii, 250 mg, Oral, Daily  senna-docusate sodium, 2 tablet, Oral, BID  sodium chloride, 10 mL, Intravenous, Q12H  vitamin D3, 5,000 Units, Oral, Daily       Pharmacy Consult - Pharmacy to dose,          Active Hospital Problems:  Active Hospital Problems    Diagnosis    • HCAP (healthcare-associated pneumonia)  Follow pulmonary recommendations.  Treat with antibiotics.      • Acute respiratory failure with hypoxia (CMS/HCC)  Treat with oxygen therapy.  Follow pulmonary recommendations.      • Pneumonia due to infectious organism    • Hypothyroid  Treatment Synthroid.      • Hyperlipidemia  Treat with Lipitor.      • Polymyalgia   Treat with pain control, Tylenol.      • Paroxysmal atrial fibrillation (CMS/HCC)  Continue to monitor.      • COPD (chronic obstructive pulmonary disease) with chronic bronchitis (CMS/HCC)  Treat with nebs, albuterol.      • GERD (gastroesophageal reflux disease)  Treat with Protonix.      • Essential hypertension  Continue to monitor.      Continue appropriate patient's home medications for other chronic medical conditions.  Continue the present level of care.  Patient and family  agreed with the plan of care.      DVT prophylaxis:  Mechanical DVT prophylaxis orders are present.    CODE STATUS:    Level Of Support Discussed With: Patient  Code Status: CPR  Medical Interventions (Level of Support Prior to Arrest): Full      Disposition: Pending patient's clinical improvement.    This patient has been examined wearing appropriate Personal Protective Equipment and discussed with hospital infection control department, [facility], infectious disease specialist and pulmonologist. 07/20/21      Electronically signed by Amrit Ramsey MD, 07/20/21, 18:24 EDT.  [facility] Hospitalist Team

## 2021-07-20 NOTE — PLAN OF CARE
Goal Outcome Evaluation:  Plan of Care Reviewed With: patient        Progress: no change  Outcome Summary: Pt. is 74 y/o female admit acute resp failure, states she lives at home alone at baseline and maintains ADL independence. Pt. requires min A for SPS transfer to and from [facility] this date and CGA to maintain dynamic standing balance during toileting hygiene from supported standing position. Pt. also w/ increased SOA this date w/ decreased ADL activity tolerance. Recommend IP rehab at d/c to address aforementioned deficits, will follow up w/ pt. 1-3x per week at [facility].

## 2021-07-20 NOTE — CASE MANAGEMENT/SOCIAL WORK
Continued Stay Note  SANDRA Christensen     Patient Name: Gali Dover  MRN: [id number]  Today's Date: 7/20/2021    Admit Date: 7/18/2021    Discharge Plan     Row Name 07/20/21 1419       Plan    Plan  PT eval ordered. From home alone, current with [facility] , ordered and accepted.    Plan Comments  DC barriers: pulmonary following, IV abx, 2 L NC        Phone communication or documentation only - no physical contact with patient or family.      Keiko Azevedo RN

## 2021-07-20 NOTE — PLAN OF CARE
Goal Outcome Evaluation:  Plan of Care Reviewed With: patient           Outcome Summary: Pt is a 74 YO F admitted with SOA, hx of COPD. Pt reports living at home alone, performs all ADLs and ambulates with RWx. Pt this date demonstrates fair functional mobility requiring MIN A with bed mobilty, and transfers with RWx. Pt this date reports feeling weaker than last admission and discussed IP rehab. Pt assisted with transfer to bedside chair using RWx, mild SOA following. Recommendaiton is IP rehab following d/c.

## 2021-07-20 NOTE — PLAN OF CARE
Goal Outcome Evaluation:  Plan of Care Reviewed With: patient        Progress: no change  Outcome Summary: Pt slept better than the night prior d/t better pain control. Pain medications requested less frequently. Reminded to turn frequently. No other complaints at this time. Will continue to monitor.   109

## 2021-07-20 NOTE — PROGRESS NOTES
"PULMONARY CRITICAL CARE Progress  NOTE      PATIENT IDENTIFICATION:  Name: Gali Dover  MRN: [id number]  :  1945     Age: 75 y.o.  Sex: female    DATE OF Note:  2021   Referring Physician: Amrit Ramsey MD                  Subjective:   Feeling better, no SOB, no chest or abd pain, no bowel or bladder issues reported  No new issues     Objective:  tMax 24 hrs: Temp (24hrs), Av.3 °F (36.8 °C), Min:97.9 °F (36.6 °C), Max:99 °F (37.2 °C)      Vitals Ranges:   Temp:  [97.9 °F (36.6 °C)-99 °F (37.2 °C)] 99 °F (37.2 °C)  Heart Rate:  [62-86] 62  Resp:  [16-20] 18  BP: (130-183)/(50-65) 159/62    Intake and Output Last 3 Shifts:   I/O last 3 completed shifts:  In: -   Out: 700 [Urine:700]    Exam:  /62   Pulse 62   Temp 99 °F (37.2 °C) (Oral)   Resp 18   Ht 170.2 cm (67\")   Wt 77.7 kg (171 lb 4.8 oz)   SpO2 98%   BMI 26.83 kg/m²     General Appearance: Alert     HEENT:  Normocephalic, without obvious abnormality, Conjunctiva/corneas clear,.  Normal external ear canals, Nares normal, no drainage     Neck:  Supple, symmetrical, trachea midline. No JVD.  Lungs /Chest wall:   Bilateral basal rhonchi, respirations unlabored symmetrical wall movement.     Heart:  Regular rate and rhythm, systolic murmur PMI left sternal border  Abdomen: Soft, non-tender, no masses, no organomegaly.    Extremities: Trace edema no clubbing or Cyanosis        Medications:    Current Facility-Administered Medications:   •  acetaminophen (TYLENOL) tablet 650 mg, 650 mg, Oral, Q4H PRN **OR** acetaminophen (TYLENOL) 160 MG/5ML solution 650 mg, 650 mg, Oral, Q4H PRN **OR** acetaminophen (TYLENOL) suppository 650 mg, 650 mg, Rectal, Q4H PRN, Amrit Ramsey MD  •  albuterol (PROVENTIL) nebulizer solution 0.083% 2.5 mg/3mL, 2.5 mg, Nebulization, Q4H PRN, Amrit Ramsey MD, 2.5 mg at 21 0747  •  aspirin tablet 325 mg, 325 mg, Oral, Daily, Amrit Ramsey MD, 325 mg at 21 0841  •  atorvastatin " (LIPITOR) tablet 20 mg, 20 mg, Oral, Daily, Amrit Ramsey MD, 20 mg at 07/20/21 0841  •  azelastine (ASTELIN) nasal spray 1 spray, 1 spray, Each Nare, BID, Amrit Ramsey MD, 1 spray at 07/20/21 0842  •  sennosides-docusate (PERICOLACE) 8.6-50 MG per tablet 2 tablet, 2 tablet, Oral, BID, 2 tablet at 07/20/21 0841 **AND** polyethylene glycol (MIRALAX) packet 17 g, 17 g, Oral, Daily PRN **AND** bisacodyl (DULCOLAX) EC tablet 5 mg, 5 mg, Oral, Daily PRN **AND** bisacodyl (DULCOLAX) suppository 10 mg, 10 mg, Rectal, Daily PRN, Amrit Ramsey MD  •  budesonide (PULMICORT) nebulizer solution 0.5 mg, 0.5 mg, Nebulization, BID - RT, William Cai MD, 0.5 mg at 07/20/21 0648  •  calcium 500 mg vitamin D 5 mcg (200 UT) per tablet 1 tablet, 1 tablet, Oral, Daily, Amrit Ramsey MD, 1 tablet at 07/20/21 0841  •  cefepime 2 gm IVPB in 100 ml NS (MBP), 2 g, Intravenous, Q8H, Amrit Ramsey MD, 2 g at 07/20/21 0840  •  cycloSPORINE (RESTASIS) 0.05 % ophthalmic emulsion 1 drop, 1 drop, Both Eyes, BID, Amrit Ramsey MD, 1 drop at 07/20/21 0841  •  digoxin (LANOXIN) tablet 250 mcg, 250 mcg, Oral, Daily, Amrit Ramsey MD, 250 mcg at 07/20/21 0841  •  escitalopram (LEXAPRO) tablet 10 mg, 10 mg, Oral, Daily, Amrit Ramsey MD, 10 mg at 07/20/21 0841  •  furosemide (LASIX) tablet 10 mg, 10 mg, Oral, Daily, Amrit Ramsey MD, 10 mg at 07/20/21 0841  •  gabapentin (NEURONTIN) capsule 300 mg, 300 mg, Oral, TID, Amrit Ramsey MD, 300 mg at 07/20/21 0841  •  guaiFENesin (MUCINEX) 12 hr tablet 600 mg, 600 mg, Oral, Q12H, Amrit Ramsey MD, 600 mg at 07/20/21 0841  •  hydrALAZINE (APRESOLINE) tablet 100 mg, 100 mg, Oral, TID, Amrit Ramsey MD, 100 mg at 07/20/21 0841  •  HYDROcodone-acetaminophen (NORCO) 5-325 MG per tablet 1 tablet, 1 tablet, Oral, Q4H PRN, Amrit Ramsey MD, 1 tablet at 07/20/21 0846  •  ipratropium-albuterol (DUO-NEB) nebulizer solution 3 mL, 3 mL, Nebulization, Q4H - RT, Trell  MD William, 3 mL at 07/20/21 1548  •  ketamine (KETALAR) 23 mg in sodium chloride 0.9 % 100 mL infusion, 0.3 mg/kg, Intravenous, Daily PRN **AND** Ketamine Vital Signs & Assessment, , , Per Order Details, Amrit Ramsey MD  •  levothyroxine (SYNTHROID, LEVOTHROID) tablet 25 mcg, 25 mcg, Oral, Q AM, Amrit Ramsey MD, 25 mcg at 07/20/21 0616  •  losartan (COZAAR) tablet 100 mg, 100 mg, Oral, Daily, Amrit Ramsey MD, 100 mg at 07/20/21 0841  •  melatonin tablet 2.5 mg, 2.5 mg, Oral, Nightly PRN, Amrit Ramsey MD  •  metoprolol succinate XL (TOPROL-XL) 24 hr tablet 100 mg, 100 mg, Oral, Daily, Amrit Ramsey MD, 100 mg at 07/20/21 0841  •  montelukast (SINGULAIR) tablet 10 mg, 10 mg, Oral, Nightly, Amrit Ramsey MD, 10 mg at 07/19/21 2125  •  NIFEdipine XL (PROCARDIA XL) 24 hr tablet 60 mg, 60 mg, Oral, Q24H, Amrit Ramsey MD, 60 mg at 07/20/21 1129  •  ondansetron (ZOFRAN) injection 4 mg, 4 mg, Intravenous, Q6H PRN, Amrit Ramsey MD  •  ondansetron (ZOFRAN) tablet 4 mg, 4 mg, Oral, Q6H PRN, Amrit Ramsey MD, 4 mg at 07/20/21 1245  •  oxyCODONE (ROXICODONE) immediate release tablet 5 mg, 5 mg, Oral, Q4H PRN, Amrit Ramsey MD, 5 mg at 07/20/21 1245  •  pantoprazole (PROTONIX) EC tablet 40 mg, 40 mg, Oral, QAM AC, Amrit Ramsey MD, 40 mg at 07/20/21 0841  •  Pharmacy Consult - Pharmacy to dose, , Does not apply, Continuous PRN, Amrit Ramsey MD  •  Pharmacy Consult for Antimicrobial Stewardship, , Does not apply, Once, Amrit Ramsey MD  •  prednisoLONE acetate (PRED FORTE) 1 % ophthalmic suspension 1 drop, 1 drop, Left Eye, Nightly, Amrit Ramsey MD, 1 drop at 07/19/21 2330  •  saccharomyces boulardii (FLORASTOR) capsule 250 mg, 250 mg, Oral, Daily, Amrit Ramsey MD, 250 mg at 07/20/21 0841  •  [COMPLETED] Insert peripheral IV, , , Once **AND** sodium chloride 0.9 % flush 10 mL, 10 mL, Intravenous, PRN, Amrit Ramsey MD  •  sodium chloride 0.9 % flush 10 mL, 10  mL, Intravenous, Q12H, Amrit Ramsey MD, 10 mL at 07/20/21 0841  •  sodium chloride 0.9 % flush 10 mL, 10 mL, Intravenous, PRN, Amrit Ramsey MD  •  vitamin D3 capsule 5,000 Units, 5,000 Units, Oral, Daily, Amrit Ramsey MD, 5,000 Units at 07/20/21 0841    Data Review:  All labs (24hrs): No results found for this or any previous visit (from the past 24 hour(s)).     Imaging:  XR Chest 1 View  Narrative: XR CHEST 1 VW-     Date of Exam: 7/19/2021 3:35 PM     Indication: s/p thoracentesis; J18.9-Pneumonia, unspecified organism;  R06.00-Dyspnea, unspecified; R09.02-Hypoxemia.     Comparison: 7/18/2021     Technique: A single view of the chest was obtained.     FINDINGS:      Heart size and pulmonary vessels are now within normal limits.   There is been interval improvement in interstitial markings and patchy  bilateral airspace disease consistent with improving pulmonary edema.   There is been decrease in size of bilateral pleural effusions.  There is  no evidence of pneumothorax.  There are surgical clips at the base of  the neck on the left unchanged from prior exam.           Impression:       1.  Interval improvement in pulmonary vascular congestion and bilateral  pulmonary interstitial and alveolar edema.  2.  Interval resolution of bilateral pleural effusions.  No evidence of  pneumothorax.        Electronically Signed By-Thomas Vera MD On:7/19/2021 3:39 PM  This report was finalized on [id number] by  Thomas Vera MD.  Adult Transthoracic Echo Complete W/ Cont if Necessary Per Protocol  · Left ventricular wall thickness is consistent with mild concentric   hypertrophy.  · Estimated left ventricular EF = 70% Estimated left ventricular EF was in   agreement with the calculated left ventricular EF. Left ventricular   systolic function is normal.  · Estimated right ventricular systolic pressure from tricuspid   regurgitation is normal (<35 mmHg).          ASSESSMENT:  Acute respiratory distress  with hypoxia  Significant right pleural effusion  Lung infiltrate possible pneumonia  COPD    Paroxysmal A-fib  GERD     Hyperlipidemia  Essential hypertension  Polymyalgia   Hypothyroidism    PLAN:  S/p thoracentesis and fluid transudate  Encouraged to use I-S flutter valve  Continue  Antibiotics  Bronchodilator  Inhaled corticosteroids  Electrolytes/ glycemic control  DVT and GI prophylaxis.       Discussed with Dr Trell Diaz, APRN   7/20/2021  16:20 EDT     I personally have examined  and interviewed the patient. I have reviewed the history, data, problems, assessment and plan with our NP.  Critical care time in direct medical management (   ) minutes  Electronically signed by William Cai MD, D,ABSM, 07/20/21, 4:23 PM EDT.

## 2021-07-20 NOTE — THERAPY EVALUATION
Patient Name: Gali Dover  : 1945    MRN: [id number]                              Today's Date: 2021       Admit Date: 2021    Visit Dx:     ICD-10-CM ICD-9-CM   1. Pneumonia due to infectious organism, unspecified laterality, unspecified part of lung  J18.9 486   2. Dyspnea, unspecified type  R06.00 786.09   3. Hypoxia  R09.02 799.02     Patient Active Problem List   Diagnosis   • Bilateral carotid artery stenosis   • Anxiety   • Asthma   • Paroxysmal atrial fibrillation (CMS/Formerly [facility])   • Depression   • GERD (gastroesophageal reflux disease)   • Hyperlipidemia   • Essential hypertension   • Polymyalgia    • Giant cell arteritis (CMS/Formerly [facility])   • Hypothyroid   • Peripheral neuropathy   • Bilateral calf pain   • COPD (chronic obstructive pulmonary disease) with chronic bronchitis (CMS/Formerly [facility])   • Dizziness on standing   • Pain in right knee   • Primary osteoarthritis of right knee   • Overweight (BMI 25.0-29.9)   • Normocytic anemia due to blood loss   • COPD exacerbation (CMS/[facility]ly [facility])   • HCAP (healthcare-associated pneumonia)   • Acute respiratory failure with hypoxia (CMS/Formerly [facility])   • Pneumonia due to infectious organism     Past Medical History:   Diagnosis Date   • Anxiety    • Arteritis (CMS/Formerly [facility])    • Asthma    • Constipation     off and on   • COPD (chronic obstructive pulmonary disease) (CMS/[facility]ly [facility])    • Disease of thyroid gland    • Disorder of left eye    • Dry eyes    • GERD (gastroesophageal reflux disease)    • Hyperlipidemia    • Hypertension    • 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    • Neuropathy    • Stricture of artery (CMS/[facility]) 2021     Past Surgical History:   Procedure Laterality Date   • CAROTID SUBCLAVIAN BYPASS Left 2021    Procedure: CAROTID SUBCLAVIAN BYPASS;  Surgeon: Navid Hassan MD;  Location: [facility];  Service: Vascular;  Laterality: Left;   •  SECTION     • EYE SURGERY      cat ext   • HARDWARE REMOVAL Right     knee   • HYSTERECTOMY      still has ovaries   • KIDNEY SURGERY       stent placed    • KNEE ARTHROSCOPY Right    • LAPAROSCOPIC CHOLECYSTECTOMY       General Information     Row Name 07/20/21 1631 07/20/21 1012       OT Time and Intention    Document Type  evaluation  -MP  evaluation  -    Mode of Treatment  occupational therapy  -MP  individual therapy  -    Row Name 07/20/21 1631 07/20/21 1012       General Information    Patient Profile Reviewed  yes  -MP  yes  -MP    Prior Level of Function  independent:;ADL's  -MP  ADL's;independent:  -MP    Row Name 07/20/21 1631 07/20/21 1012       Living Environment    Lives With  alone  -MP  alone  -MP    [facility] Name 07/20/21 1631 07/20/21 1012       Cognition    Orientation Status (Cognition)  oriented x 3  -MP  oriented x 3  -MP    [facility] Name 07/20/21 1631 07/20/21 1012       Safety Issues, Functional Mobility    Impairments Affecting Function (Mobility)  balance;strength;endurance/activity tolerance  -MP  balance;strength  -MP      User Key  (r) = Recorded By, (t) = Taken By, (c) = Cosigned By    Initials Name Provider Type    MP Lalo Dyson, KAYLAH Occupational Therapist          Mobility/ADL's     Row Name 07/20/21 1632          Bed Mobility    Bed Mobility  sit-supine  -     Sit-Supine [place] (Bed Mobility)  minimum assist (75% patient effort)  -[facility] Name 07/20/21 1632          Transfers    Transfers  toilet transfer  -     Sit-Stand [place] (Transfers)  minimum assist (75% patient effort)  -     [place] Level (Toilet Transfer)  minimum assist (75% patient effort);1 person assist  -[facility] Name 07/20/21 1632          Toilet Transfer    Type (Toilet Transfer)  stand-sit;sit-stand  -[facility] Name 07/20/21 1632          Activities of Daily Living    BADL Assessment/Intervention  lower body dressing;toileting  -[facility] Name 07/20/21 1632          Lower Body Dressing Assessment/Training    [place] Level (Lower Body Dressing)  don;doff;shoes/slippers;set up  -[facility] Name 07/20/21 1632           Toileting Assessment/Training    [place] Level (Toileting)  toileting skills;contact guard assist  -MP     Position (Toileting)  supported standing  -MP       User Key  (r) = Recorded By, (t) = Taken By, (c) = Cosigned By    Initials Name Provider Type    Lalo Cortez OT Occupational Therapist        Obj/Interventions     Row Name 07/20/21 1635          Range of Motion Comprehensive    Comment, General Range of Motion  BUE WFL  -MP     Row Name 07/20/21 1635          Strength Comprehensive (MMT)    Comment, General Manual Muscle Testing (MMT) Assessment  BUE 4-/5  -MP     Row Name 07/20/21 1635          Balance    Static Standing Balance  WFL;supported;standing  -MP     Dynamic Standing Balance  mild impairment;supported;standing  -MP     Balance Interventions  sitting;standing;sit to stand;supported;static;dynamic;occupation based/functional task  -MP       User Key  (r) = Recorded By, (t) = Taken By, (c) = Cosigned By    Initials Name Provider Type    Lalo Cortez OT Occupational Therapist        Goals/Plan     Row Name 07/20/21 1640          Bed Mobility Goal 1 (OT)    Activity/Assistive Device (Bed Mobility Goal 1, OT)  bed mobility activities, all  -MP     [place] Level/Cues Needed (Bed Mobility Goal 1, OT)  contact guard assist  -MP     Time Frame (Bed Mobility Goal 1, OT)  long term goal (LTG);2 weeks  -MP     Row Name 07/20/21 1640          Transfer Goal 1 (OT)    Activity/Assistive Device (Transfer Goal 1, OT)  sit-to-stand/stand-to-sit;toilet  -MP     [place] Level/Cues Needed (Transfer Goal 1, OT)  supervision required  -MP     Time Frame (Transfer Goal 1, OT)  long term goal (LTG);2 weeks  -MP     Row Name 07/20/21 1640          Dressing Goal 1 (OT)    Activity/Device (Dressing Goal 1, OT)  lower body dressing  -MP     [place]/Cues Needed (Dressing Goal 1, OT)  minimum assist (75% or more patient effort)  -MP     Time Frame (Dressing Goal 1, OT)  long term goal  (LTG);2 weeks  -MP       User Key  (r) = Recorded By, (t) = Taken By, (c) = Cosigned By    Initials Name Provider Type    Lalo Cortez, KAYLAH Occupational Therapist        Clinical Impression     Row Name 07/20/21 1635          Pain Assessment    Additional Documentation  Pain Scale: FACES Pre/Post-Treatment (Group)  -MP     Row Name 07/20/21 1635          Pain Scale: FACES Pre/Post-Treatment    Pain: FACES Scale, Pretreatment  2-->hurts little bit  -MP     Posttreatment Pain Rating  2-->hurts little bit  -MP     Row Name 07/20/21 1635          Plan of Care Review    Plan of Care Reviewed With  patient  -MP     Progress  no change  -MP     Outcome Summary  Pt. is 74 y/o female admit acute resp failure, states she lives at home alone at baseline and maintains ADL independence. Pt. requires min A for SPS transfer to and from [facility] this date and CGA to maintain dynamic standing balance during toileting hygiene from supported standing position. Pt. also w/ increased SOA this date w/ decreased ADL activity tolerance. Recommend IP rehab at d/c to address aforementioned deficits, will follow up w/ pt. 1-3x per week at [facility].  -MP     Row Name 07/20/21 1635          Therapy Assessment/Plan (OT)    Rehab Potential (OT)  good, to achieve stated therapy goals  -MP     Criteria for Skilled Therapeutic Interventions Met (OT)  yes  -MP     Therapy Frequency (OT)  3 times/wk  -MP     Row Name 07/20/21 1635          Therapy Plan Review/Discharge Plan (OT)    Anticipated Discharge Disposition (OT)  inpatient rehabilitation facility  -MP     Row Name 07/20/21 1635          Vital Signs    Pre Patient Position  Sitting  -MP     Intra Patient Position  Standing  -MP     Post Patient Position  Supine  -MP     Row Name 07/20/21 1635          Positioning and Restraints    Pre-Treatment Position  sitting in chair/recliner  -MP     Post Treatment Position  bed  -MP     In Bed  call light within reach;encouraged to call for assist;exit  alarm on  -MP       User Key  (r) = Recorded By, (t) = Taken By, (c) = Cosigned By    Initials Name Provider Type    Lalo Cortez OT Occupational Therapist        Outcome Measures     Row Name 07/20/21 1424          How much help from another person do you currently need...    Turning from your back to your side while in flat bed without using bedrails?  3  -EL     Moving from lying on back to sitting on the side of a flat bed without bedrails?  3  -EL     Moving to and from a bed to a chair (including a wheelchair)?  3  -EL     Standing up from a chair using your arms (e.g., wheelchair, bedside chair)?  3  -EL     Climbing 3-5 steps with a railing?  2  -EL     To walk in hospital room?  2  -EL     AM-PAC 6 Clicks Score (PT)  16  -EL     Row Name 07/20/21 1424          Functional Assessment    Outcome Measure Options  AM-PAC 6 Clicks Basic Mobility (PT)  -EL       User Key  (r) = Recorded By, (t) = Taken By, (c) = Cosigned By    Initials Name Provider Type    Kem Demarco, PT Physical Therapist          Occupational Therapy Education                 Title: PT OT SLP Therapies (In Progress)     Topic: Occupational Therapy (In Progress)     Point: ADL training (Done)     Description:   Instruct learner(s) on proper safety adaptation and remediation techniques during self care or transfers.   Instruct in proper use of assistive devices.              Learning Progress Summary           Patient Acceptance, E, VU by ST at 7/19/2021 2010    Acceptance, E, VU by ST at 7/19/2021 0230                   Point: Home exercise program (Not Started)     Description:   Instruct learner(s) on appropriate technique for monitoring, assisting and/or progressing therapeutic exercises/activities.              Learner Progress:  Not documented in this visit.          Point: Precautions (Not Started)     Description:   Instruct learner(s) on prescribed precautions during self-care and functional transfers.              Learner  Progress:  Not documented in this visit.          Point: Body mechanics (Done)     Description:   Instruct learner(s) on proper positioning and spine alignment during self-care, functional mobility activities and/or exercises.              Learning Progress Summary           Patient Acceptance, E,TB, VU by  at 7/20/2021 1640                               User Key     Initials Effective Dates Name Provider Type Discipline     06/16/21 -  Lalo Dyson OT Occupational Therapist OT     02/01/21 -  Jess Morejon, RN Registered Nurse Nurse              OT Recommendation and Plan  Therapy Frequency (OT): 3 times/wk  Plan of Care Review  Plan of Care Reviewed With: patient  Progress: no change  Outcome Summary: Pt. is 76 y/o female admit acute resp failure, states she lives at home alone at baseline and maintains ADL independence. Pt. requires min A for SPS transfer to and from [facility] this date and CGA to maintain dynamic standing balance during toileting hygiene from supported standing position. Pt. also w/ increased SOA this date w/ decreased ADL activity tolerance. Recommend IP rehab at d/c to address aforementioned deficits, will follow up w/ pt. 1-3x per week at [facility].     Time Calculation:   Time Calculation- OT     Row Name 07/20/21 1640             Time Calculation- OT    OT Start Time  1015  -      OT Stop Time  1045  -      OT Time Calculation (min)  30 min  -      Total Timed Code Minutes- OT  10 minute(s)  -      OT Received On  07/20/21  -      OT - Next Appointment  07/22/21  -      OT Goal Re-Cert Due Date  08/03/21  -        User Key  (r) = Recorded By, (t) = Taken By, (c) = Cosigned By    Initials Name Provider Type     Lalo Dyson OT Occupational Therapist        Therapy Charges for Today     Code Description Service Date Service Provider Modifiers Qty    [id number]  OT EVAL LOW COMPLEXITY 4 7/20/2021 Lalo Dyson OT GO 1    [id number] HC OT SELF  CARE/MGMT/TRAIN EA 15 MIN 7/20/2021 Lalo Dyson, OT GO 1               Lalo Dyson, KAYLAH  7/20/2021

## 2021-07-21 LAB
ANION GAP SERPL CALCULATED.3IONS-SCNC: 11 MMOL/L (ref 5–15)
BASOPHILS # BLD AUTO: 0.1 10*3/MM3 (ref 0–0.2)
BASOPHILS NFR BLD AUTO: 1 % (ref 0–1.5)
BUN SERPL-MCNC: 5 MG/DL (ref 8–23)
BUN/CREAT SERPL: 8.2 (ref 7–25)
CALCIUM SPEC-SCNC: 8.5 MG/DL (ref 8.6–10.5)
CHLORIDE SERPL-SCNC: 102 MMOL/L (ref 98–107)
CO2 SERPL-SCNC: 23 MMOL/L (ref 22–29)
CREAT SERPL-MCNC: 0.61 MG/DL (ref 0.57–1)
DEPRECATED RDW RBC AUTO: 44.6 FL (ref 37–54)
EOSINOPHIL # BLD AUTO: 0.6 10*3/MM3 (ref 0–0.4)
EOSINOPHIL NFR BLD AUTO: 11 % (ref 0.3–6.2)
ERYTHROCYTE [DISTWIDTH] IN BLOOD BY AUTOMATED COUNT: 14.7 % (ref 12.3–15.4)
GFR SERPL CREATININE-BSD FRML MDRD: 95 ML/MIN/1.73
GLUCOSE SERPL-MCNC: 150 MG/DL (ref 65–99)
HCT VFR BLD AUTO: 23 % (ref 34–46.6)
HGB BLD-MCNC: 7.6 G/DL (ref 12–15.9)
LAB AP CASE REPORT: NORMAL
LYMPHOCYTES # BLD AUTO: 1 10*3/MM3 (ref 0.7–3.1)
LYMPHOCYTES NFR BLD AUTO: 18 % (ref 19.6–45.3)
MAGNESIUM SERPL-MCNC: 1.6 MG/DL (ref 1.6–2.4)
MCH RBC QN AUTO: 29 PG (ref 26.6–33)
MCHC RBC AUTO-ENTMCNC: 33.2 G/DL (ref 31.5–35.7)
MCV RBC AUTO: 87.3 FL (ref 79–97)
MONOCYTES # BLD AUTO: 0.6 10*3/MM3 (ref 0.1–0.9)
MONOCYTES NFR BLD AUTO: 11.3 % (ref 5–12)
NEUTROPHILS NFR BLD AUTO: 3.3 10*3/MM3 (ref 1.7–7)
NEUTROPHILS NFR BLD AUTO: 58.7 % (ref 42.7–76)
NRBC BLD AUTO-RTO: 0 /100 WBC (ref 0–0.2)
PATH REPORT.FINAL DX SPEC: NORMAL
PATH REPORT.GROSS SPEC: NORMAL
PLATELET # BLD AUTO: 342 10*3/MM3 (ref 140–450)
PMV BLD AUTO: 8.4 FL (ref 6–12)
POTASSIUM SERPL-SCNC: 3.9 MMOL/L (ref 3.5–5.2)
RBC # BLD AUTO: 2.64 10*6/MM3 (ref 3.77–5.28)
SODIUM SERPL-SCNC: 136 MMOL/L (ref 136–145)
WBC # BLD AUTO: 5.7 10*3/MM3 (ref 3.4–10.8)

## 2021-07-21 PROCEDURE — 83735 ASSAY OF MAGNESIUM: CPT | Performed by: INTERNAL MEDICINE

## 2021-07-21 PROCEDURE — 85025 COMPLETE CBC W/AUTO DIFF WBC: CPT | Performed by: INTERNAL MEDICINE

## 2021-07-21 PROCEDURE — 94799 UNLISTED PULMONARY SVC/PX: CPT

## 2021-07-21 PROCEDURE — 80048 BASIC METABOLIC PNL TOTAL CA: CPT | Performed by: INTERNAL MEDICINE

## 2021-07-21 PROCEDURE — 25010000002 CEFEPIME PER 500 MG: Performed by: INTERNAL MEDICINE

## 2021-07-21 PROCEDURE — 94760 N-INVAS EAR/PLS OXIMETRY 1: CPT

## 2021-07-21 PROCEDURE — 99233 SBSQ HOSP IP/OBS HIGH 50: CPT | Performed by: INTERNAL MEDICINE

## 2021-07-21 PROCEDURE — 25010000002 ONDANSETRON PER 1 MG: Performed by: INTERNAL MEDICINE

## 2021-07-21 RX ADMIN — NIFEDIPINE 60 MG: 30 TABLET, FILM COATED, EXTENDED RELEASE ORAL at 08:56

## 2021-07-21 RX ADMIN — GUAIFENESIN 600 MG: 600 TABLET, EXTENDED RELEASE ORAL at 08:59

## 2021-07-21 RX ADMIN — CYCLOSPORINE 1 DROP: 0.5 EMULSION OPHTHALMIC at 22:57

## 2021-07-21 RX ADMIN — IPRATROPIUM BROMIDE AND ALBUTEROL SULFATE 3 ML: 2.5; .5 SOLUTION RESPIRATORY (INHALATION) at 08:20

## 2021-07-21 RX ADMIN — MONTELUKAST 10 MG: 10 TABLET, FILM COATED ORAL at 22:57

## 2021-07-21 RX ADMIN — ONDANSETRON 4 MG: 2 INJECTION INTRAMUSCULAR; INTRAVENOUS at 00:53

## 2021-07-21 RX ADMIN — Medication 5000 UNITS: at 09:00

## 2021-07-21 RX ADMIN — LOSARTAN POTASSIUM 100 MG: 50 TABLET, FILM COATED ORAL at 08:59

## 2021-07-21 RX ADMIN — CALCIUM CARBONATE-VITAMIN D TAB 500 MG-200 UNIT 1 TABLET: 500-200 TAB at 08:56

## 2021-07-21 RX ADMIN — OXYCODONE HYDROCHLORIDE 5 MG: 5 TABLET ORAL at 19:16

## 2021-07-21 RX ADMIN — HYDRALAZINE HYDROCHLORIDE 100 MG: 25 TABLET, FILM COATED ORAL at 16:10

## 2021-07-21 RX ADMIN — ONDANSETRON 4 MG: 2 INJECTION INTRAMUSCULAR; INTRAVENOUS at 09:00

## 2021-07-21 RX ADMIN — HYDROCODONE BITARTRATE AND ACETAMINOPHEN 1 TABLET: 5; 325 TABLET ORAL at 16:12

## 2021-07-21 RX ADMIN — HYDRALAZINE HYDROCHLORIDE 100 MG: 25 TABLET, FILM COATED ORAL at 22:57

## 2021-07-21 RX ADMIN — Medication 250 MG: at 08:58

## 2021-07-21 RX ADMIN — IPRATROPIUM BROMIDE AND ALBUTEROL SULFATE 3 ML: 2.5; .5 SOLUTION RESPIRATORY (INHALATION) at 19:06

## 2021-07-21 RX ADMIN — HYDROCODONE BITARTRATE AND ACETAMINOPHEN 1 TABLET: 5; 325 TABLET ORAL at 22:57

## 2021-07-21 RX ADMIN — CYCLOSPORINE 1 DROP: 0.5 EMULSION OPHTHALMIC at 09:00

## 2021-07-21 RX ADMIN — OXYCODONE HYDROCHLORIDE 5 MG: 5 TABLET ORAL at 00:53

## 2021-07-21 RX ADMIN — GABAPENTIN 300 MG: 300 CAPSULE ORAL at 22:58

## 2021-07-21 RX ADMIN — LEVOTHYROXINE SODIUM 25 MCG: 0.03 TABLET ORAL at 06:01

## 2021-07-21 RX ADMIN — ESCITALOPRAM OXALATE 10 MG: 10 TABLET ORAL at 09:00

## 2021-07-21 RX ADMIN — OXYCODONE HYDROCHLORIDE 5 MG: 5 TABLET ORAL at 08:57

## 2021-07-21 RX ADMIN — GABAPENTIN 300 MG: 300 CAPSULE ORAL at 16:10

## 2021-07-21 RX ADMIN — Medication 2.5 MG: at 00:53

## 2021-07-21 RX ADMIN — BUDESONIDE 0.5 MG: 0.5 SUSPENSION RESPIRATORY (INHALATION) at 19:06

## 2021-07-21 RX ADMIN — HYDROCODONE BITARTRATE AND ACETAMINOPHEN 1 TABLET: 5; 325 TABLET ORAL at 06:27

## 2021-07-21 RX ADMIN — GABAPENTIN 300 MG: 300 CAPSULE ORAL at 08:58

## 2021-07-21 RX ADMIN — METOPROLOL SUCCINATE 100 MG: 50 TABLET, EXTENDED RELEASE ORAL at 08:58

## 2021-07-21 RX ADMIN — BUDESONIDE 0.5 MG: 0.5 SUSPENSION RESPIRATORY (INHALATION) at 08:25

## 2021-07-21 RX ADMIN — ASPIRIN 325 MG ORAL TABLET 325 MG: 325 PILL ORAL at 08:56

## 2021-07-21 RX ADMIN — DOCUSATE SODIUM 50 MG AND SENNOSIDES 8.6 MG 2 TABLET: 8.6; 5 TABLET, FILM COATED ORAL at 22:58

## 2021-07-21 RX ADMIN — FUROSEMIDE 10 MG: 20 TABLET ORAL at 08:57

## 2021-07-21 RX ADMIN — GUAIFENESIN 600 MG: 600 TABLET, EXTENDED RELEASE ORAL at 22:57

## 2021-07-21 RX ADMIN — CEFEPIME 2 G: 2 INJECTION, POWDER, FOR SOLUTION INTRAVENOUS at 17:27

## 2021-07-21 RX ADMIN — Medication 10 ML: at 09:00

## 2021-07-21 RX ADMIN — IPRATROPIUM BROMIDE AND ALBUTEROL SULFATE 3 ML: 2.5; .5 SOLUTION RESPIRATORY (INHALATION) at 15:36

## 2021-07-21 RX ADMIN — ONDANSETRON 4 MG: 2 INJECTION INTRAMUSCULAR; INTRAVENOUS at 19:16

## 2021-07-21 RX ADMIN — CEFEPIME 2 G: 2 INJECTION, POWDER, FOR SOLUTION INTRAVENOUS at 09:00

## 2021-07-21 RX ADMIN — HYDRALAZINE HYDROCHLORIDE 100 MG: 25 TABLET, FILM COATED ORAL at 08:58

## 2021-07-21 RX ADMIN — AZELASTINE HYDROCHLORIDE 1 SPRAY: 137 SPRAY, METERED NASAL at 09:01

## 2021-07-21 RX ADMIN — ATORVASTATIN CALCIUM 20 MG: 20 TABLET, FILM COATED ORAL at 08:56

## 2021-07-21 RX ADMIN — AZELASTINE HYDROCHLORIDE 1 SPRAY: 137 SPRAY, METERED NASAL at 22:58

## 2021-07-21 RX ADMIN — DIGOXIN 250 MCG: 250 TABLET ORAL at 08:58

## 2021-07-21 RX ADMIN — CEFEPIME 2 G: 2 INJECTION, POWDER, FOR SOLUTION INTRAVENOUS at 00:52

## 2021-07-21 RX ADMIN — PREDNISOLONE ACETATE 1 DROP: 10 SUSPENSION/ DROPS OPHTHALMIC at 22:58

## 2021-07-21 RX ADMIN — PANTOPRAZOLE SODIUM 40 MG: 40 TABLET, DELAYED RELEASE ORAL at 08:59

## 2021-07-21 RX ADMIN — Medication 10 ML: at 22:58

## 2021-07-21 RX ADMIN — DOCUSATE SODIUM 50 MG AND SENNOSIDES 8.6 MG 2 TABLET: 8.6; 5 TABLET, FILM COATED ORAL at 08:59

## 2021-07-21 RX ADMIN — IPRATROPIUM BROMIDE AND ALBUTEROL SULFATE 3 ML: 2.5; .5 SOLUTION RESPIRATORY (INHALATION) at 11:40

## 2021-07-21 NOTE — PROGRESS NOTES
[facility] Daily Progress Note    Patient Name: Gali Dover  : 1945  MRN: [id number]  Primary Care Physician:  Chris Pedro MD  Date of admission: 2021      Subjective      Chief Complaint: Shortness of breath.      Patient Reports slight improvement in her symptoms.    Review of Systems   Constitutional: Negative.   HENT: Negative.    Eyes: Negative.    Cardiovascular: Negative.    Respiratory: Negative.    Endocrine: Negative.    Hematologic/Lymphatic: Negative.    Skin: Negative.    Gastrointestinal: Negative.    Genitourinary: Negative.    Neurological: Negative.    Psychiatric/Behavioral: Negative.    Allergic/Immunologic: Negative.           Objective      Vitals:   Temp:  [97.3 °F (36.3 °C)-98.2 °F (36.8 °C)] 98.2 °F (36.8 °C)  Heart Rate:  [62-80] 80  Resp:  [16-20] 18  BP: (138-172)/(52-60) 160/52  Flow (L/min):  [2] 2    Physical Exam  Vitals and nursing note reviewed.   Constitutional:       General: She is not in acute distress.     Appearance: Normal appearance.   HENT:      Head: Normocephalic and atraumatic.      Nose: Nose normal. No congestion or rhinorrhea.      Mouth/Throat:      Mouth: Mucous membranes are moist.      Pharynx: Oropharynx is clear. No oropharyngeal exudate or posterior oropharyngeal erythema.   Eyes:      Pupils: Pupils are equal, round, and reactive to light.   Cardiovascular:      Pulses: Normal pulses.      Heart sounds: Normal heart sounds. No murmur heard.   No friction rub. No gallop.       Comments: S1 and S2 present.  No tachycardia.  Pulmonary:      Effort: No respiratory distress.      Breath sounds: No wheezing, rhonchi or rales.      Comments: Improved air entry bilaterally.    Chest:      Chest wall: No tenderness.   Abdominal:      General: Abdomen is flat. Bowel sounds are normal. There is no distension.      Palpations: Abdomen is soft.      Tenderness: There is no right CVA tenderness.   Musculoskeletal:          General: No swelling, tenderness, deformity or signs of injury.      Cervical back: Neck supple. No tenderness.      Right lower leg: No edema.      Left lower leg: No edema.   Skin:     Capillary Refill: Capillary refill takes less than 2 seconds.      Coloration: Skin is not jaundiced.      Findings: No bruising, lesion or rash.   Neurological:      Mental Status: She is alert.      Comments: No facial asymmetry noted.  Gait and station not tested.   Psychiatric:      Comments: No agitation.               Result Review    Result Review:  I have personally reviewed the results from the time of this admission to 7/21/2021 19:31 EDT and agree with these findings:  [x]  Laboratory  [x]  Microbiology  [x]  Radiology  []  EKG/Telemetry   []  Cardiology/Vascular   []  Pathology  []  Old records  []  Other:  Most notable findings include: Hemoglobin and hematocrit are 7.6 and 23.0.          Assessment/Plan      Brief Patient Summary:  Patient is a 75-year-old female with past medical history of asthma, COPD, GERD, neuropathy, low serum IgG, hypertension, hyperlipidemia, left eye disorder, thyroid disease, arthritis and anxiety disorder who presented to the emergency room because of shortness of breath.  Patient's family reported that her oxygen saturation was in the 72 percent on room air.  Patient's oxygen saturation in the emergency room was reported to be in the low 80s prior to administration of oxygen therapy.  Patient reported being in the hospital a couple of weeks ago when she was tested for home oxygen and she did not qualify for home oxygen.  Patient reported having shortness of breath since she was discharged from the hospital but worse in the last 2days.  Patient also complained of cough.  Patient presented to the emergency room for further assessment.  Patient was seen in the emergency room and was diagnosed with HCAP and acute respiratory failure with hypoxia.         aspirin, 325 mg, Oral,  Daily  atorvastatin, 20 mg, Oral, Daily  azelastine, 1 spray, Each Nare, BID  budesonide, 0.5 mg, Nebulization, BID - RT  calcium 500 mg vitamin D 5 mcg (200 UT), 1 tablet, Oral, Daily  cefepime, 2 g, Intravenous, Q8H  cycloSPORINE, 1 drop, Both Eyes, BID  digoxin, 250 mcg, Oral, Daily  escitalopram, 10 mg, Oral, Daily  furosemide, 10 mg, Oral, Daily  gabapentin, 300 mg, Oral, TID  guaiFENesin, 600 mg, Oral, Q12H  hydrALAZINE, 100 mg, Oral, TID  ipratropium-albuterol, 3 mL, Nebulization, Q4H - RT  levothyroxine, 25 mcg, Oral, Q AM  losartan, 100 mg, Oral, Daily  metoprolol succinate XL, 100 mg, Oral, Daily  montelukast, 10 mg, Oral, Nightly  NIFEdipine XL, 60 mg, Oral, Q24H  pantoprazole, 40 mg, Oral, QAM   Pharmacy Consult for Antimicrobial Stewardship, , Does not apply, Once  prednisoLONE acetate, 1 drop, Left Eye, Nightly  saccharomyces boulardii, 250 mg, Oral, Daily  senna-docusate sodium, 2 tablet, Oral, BID  sodium chloride, 10 mL, Intravenous, Q12H  vitamin D3, 5,000 Units, Oral, Daily       Pharmacy Consult - Pharmacy to dose,          Active Hospital Problems:  Active Hospital Problems    Diagnosis    • HCAP (healthcare-associated pneumonia)  Follow pulmonary recommendations.  Treat with antibiotics.      • Acute respiratory failure with hypoxia (CMS/HCC)  Treat with oxygen therapy.  Follow pulmonary recommendations.      • Pneumonia due to infectious organism    • Hypothyroid  Treatment Synthroid.      • Hyperlipidemia  Treat with Lipitor.      • Polymyalgia   Treat with pain control, Tylenol.      • Paroxysmal atrial fibrillation (CMS/HCC)  Continue to monitor.      • COPD (chronic obstructive pulmonary disease) with chronic bronchitis (CMS/HCC)  Treat with nebs, albuterol.      • GERD (gastroesophageal reflux disease)  Treat with Protonix.      • Essential hypertension  Continue to monitor.      Continue appropriate patient's home medications for other chronic medical conditions.  Continue the present  level of care.  Patient and family agreed with the plan of care.      DVT prophylaxis:  Mechanical DVT prophylaxis orders are present.    CODE STATUS:    Level Of Support Discussed With: Patient  Code Status: CPR  Medical Interventions (Level of Support Prior to Arrest): Full      Disposition: Pending patient's clinical improvement.    This patient has been examined wearing appropriate Personal Protective Equipment and discussed with hospital infection control department, [facility], infectious disease specialist and pulmonologist. 07/21/21      Electronically signed by Amrit Ramsey MD, 07/21/21, 19:31 EDT.  [facility] Hospitalist Team

## 2021-07-21 NOTE — CASE MANAGEMENT/SOCIAL WORK
Continued Stay Note  SANDRA Christensen     Patient Name: Gali Dover  MRN: [id number]  Today's Date: 7/21/2021    Admit Date: 7/18/2021    Discharge Plan     Row Name 07/21/21 1051       Plan    Plan  PT eval ordered. From home alone, current with [facility] , ordered and accepted. PASRR approved if needed.        Phone communication or documentation only - no physical contact with patient or family.    [facility] [place]    Office: [phone]  Cell: [phone]  Fax: [phone]  E-mail: [email or web address]

## 2021-07-21 NOTE — PROGRESS NOTES
"PULMONARY CRITICAL CARE Progress  NOTE      PATIENT IDENTIFICATION:  Name: Gali Dover  MRN: [id number]  :  1945     Age: 75 y.o.  Sex: female    DATE OF Note:  2021   Referring Physician: Amrit Ramsey MD                  Subjective:   No new issue  No SOB, no chest or abd pain, no bowel or bladder issues reported       Objective:  tMax 24 hrs: Temp (24hrs), Av.3 °F (36.8 °C), Min:97.3 °F (36.3 °C), Max:99 °F (37.2 °C)      Vitals Ranges:   Temp:  [97.3 °F (36.3 °C)-99 °F (37.2 °C)] 97.3 °F (36.3 °C)  Heart Rate:  [62-86] 65  Resp:  [16-18] 18  BP: (130-177)/(56-72) 146/56    Intake and Output Last 3 Shifts:   I/O last 3 completed shifts:  In: 240 [P.O.:240]  Out: 300 [Urine:300]    Exam:  /56 (BP Location: Left arm, Patient Position: Lying)   Pulse 65   Temp 97.3 °F (36.3 °C) (Oral)   Resp 18   Ht 170.2 cm (67\")   Wt 75.7 kg (166 lb 14.2 oz)   SpO2 96%   BMI 26.14 kg/m²     General Appearance: Alert   awake oriented not in distress  HEENT:  Normocephalic, without obvious abnormality, Conjunctiva/corneas clear,.  Normal external ear canals, Nares normal, no drainage     Neck:  Supple, symmetrical, trachea midline. No JVD.  Lungs /Chest wall:   Bilateral basal rhonchi, respirations unlabored symmetrical wall movement.     Heart:  Regular rate and rhythm, systolic murmur PMI left sternal border  Abdomen: Soft, non-tender, no masses, no organomegaly.    Extremities: Trace edema no clubbing or Cyanosis        Medications:    Current Facility-Administered Medications:   •  acetaminophen (TYLENOL) tablet 650 mg, 650 mg, Oral, Q4H PRN **OR** acetaminophen (TYLENOL) 160 MG/5ML solution 650 mg, 650 mg, Oral, Q4H PRN **OR** acetaminophen (TYLENOL) suppository 650 mg, 650 mg, Rectal, Q4H PRN, Amrit Ramsey MD  •  albuterol (PROVENTIL) nebulizer solution 0.083% 2.5 mg/3mL, 2.5 mg, Nebulization, Q4H PRN, Amrit Ramsey MD, 2.5 mg at 21 0772  •  aspirin tablet 325 mg, 325 mg, " Oral, Daily, Amrit Ramsey MD, 325 mg at 07/20/21 0841  •  atorvastatin (LIPITOR) tablet 20 mg, 20 mg, Oral, Daily, Amrit Ramsey MD, 20 mg at 07/20/21 0841  •  azelastine (ASTELIN) nasal spray 1 spray, 1 spray, Each Nare, BID, Amrit Ramsey MD, 1 spray at 07/20/21 2032  •  sennosides-docusate (PERICOLACE) 8.6-50 MG per tablet 2 tablet, 2 tablet, Oral, BID, 2 tablet at 07/20/21 2030 **AND** polyethylene glycol (MIRALAX) packet 17 g, 17 g, Oral, Daily PRN **AND** bisacodyl (DULCOLAX) EC tablet 5 mg, 5 mg, Oral, Daily PRN **AND** bisacodyl (DULCOLAX) suppository 10 mg, 10 mg, Rectal, Daily PRN, Amrit Ramsey MD  •  budesonide (PULMICORT) nebulizer solution 0.5 mg, 0.5 mg, Nebulization, BID - RT, William Cai MD, 0.5 mg at 07/20/21 1845  •  calcium 500 mg vitamin D 5 mcg (200 UT) per tablet 1 tablet, 1 tablet, Oral, Daily, Amrit Ramsey MD, 1 tablet at 07/20/21 0841  •  cefepime 2 gm IVPB in 100 ml NS (MBP), 2 g, Intravenous, Q8H, Amrit Ramsey MD, 2 g at 07/21/21 0052  •  cycloSPORINE (RESTASIS) 0.05 % ophthalmic emulsion 1 drop, 1 drop, Both Eyes, BID, Amrit Ramsey MD, 1 drop at 07/20/21 2030  •  digoxin (LANOXIN) tablet 250 mcg, 250 mcg, Oral, Daily, Amrit Ramsey MD, 250 mcg at 07/20/21 0841  •  escitalopram (LEXAPRO) tablet 10 mg, 10 mg, Oral, Daily, Amrit Ramsey MD, 10 mg at 07/20/21 0841  •  furosemide (LASIX) tablet 10 mg, 10 mg, Oral, Daily, Amrit Ramsey MD, 10 mg at 07/20/21 0841  •  gabapentin (NEURONTIN) capsule 300 mg, 300 mg, Oral, TID, Amrit Ramsey MD, 300 mg at 07/20/21 2030  •  guaiFENesin (MUCINEX) 12 hr tablet 600 mg, 600 mg, Oral, Q12H, Amrit Ramsey MD, 600 mg at 07/20/21 2030  •  hydrALAZINE (APRESOLINE) tablet 100 mg, 100 mg, Oral, TID, Amrit Ramsey MD, 100 mg at 07/20/21 2029  •  HYDROcodone-acetaminophen (NORCO) 5-325 MG per tablet 1 tablet, 1 tablet, Oral, Q4H PRN, Amrit Ramsey MD, 1 tablet at 07/21/21 0627  •   ipratropium-albuterol (DUO-NEB) nebulizer solution 3 mL, 3 mL, Nebulization, Q4H - RT, William Cai MD, 3 mL at 07/20/21 1851  •  ketamine (KETALAR) 23 mg in sodium chloride 0.9 % 100 mL infusion, 0.3 mg/kg, Intravenous, Daily PRN **AND** Ketamine Vital Signs & Assessment, , , Per Order Details, Amrit Ramsey MD  •  levothyroxine (SYNTHROID, LEVOTHROID) tablet 25 mcg, 25 mcg, Oral, Q AM, Amrit Ramsey MD, 25 mcg at 07/21/21 0601  •  losartan (COZAAR) tablet 100 mg, 100 mg, Oral, Daily, Amrit Ramsey MD, 100 mg at 07/20/21 0841  •  melatonin tablet 2.5 mg, 2.5 mg, Oral, Nightly PRN, Amrit Ramsey MD, 2.5 mg at 07/21/21 0053  •  metoprolol succinate XL (TOPROL-XL) 24 hr tablet 100 mg, 100 mg, Oral, Daily, Amrit Ramsey MD, 100 mg at 07/20/21 0841  •  montelukast (SINGULAIR) tablet 10 mg, 10 mg, Oral, Nightly, Amrit Ramsey MD, 10 mg at 07/20/21 2029  •  NIFEdipine XL (PROCARDIA XL) 24 hr tablet 60 mg, 60 mg, Oral, Q24H, Amrit Ramsey MD, 60 mg at 07/20/21 1129  •  ondansetron (ZOFRAN) injection 4 mg, 4 mg, Intravenous, Q6H PRN, Amrit Ramsey MD, 4 mg at 07/21/21 0053  •  ondansetron (ZOFRAN) tablet 4 mg, 4 mg, Oral, Q6H PRN, Amrit Ramsey MD, 4 mg at 07/20/21 1245  •  oxyCODONE (ROXICODONE) immediate release tablet 5 mg, 5 mg, Oral, Q4H PRN, Amrit Ramsey MD, 5 mg at 07/21/21 0053  •  pantoprazole (PROTONIX) EC tablet 40 mg, 40 mg, Oral, QAM AC, Amrit Ramsey MD, 40 mg at 07/20/21 0841  •  Pharmacy Consult - Pharmacy to dose, , Does not apply, Continuous PRN, Amrit Ramsey MD  •  Pharmacy Consult for Antimicrobial Stewardship, , Does not apply, Once, Amrit Ramsey MD  •  prednisoLONE acetate (PRED FORTE) 1 % ophthalmic suspension 1 drop, 1 drop, Left Eye, Nightly, Amrit Ramsey MD, 1 drop at 07/20/21 2033  •  saccharomyces boulardii (FLORASTOR) capsule 250 mg, 250 mg, Oral, Daily, Amrit Ramsey MD, 250 mg at 07/20/21 0841  •  [COMPLETED] Insert peripheral  IV, , , Once **AND** sodium chloride 0.9 % flush 10 mL, 10 mL, Intravenous, PRN, Amrit Ramsey MD  •  sodium chloride 0.9 % flush 10 mL, 10 mL, Intravenous, Q12H, Amrit Ramsey MD, 10 mL at 07/20/21 2033  •  sodium chloride 0.9 % flush 10 mL, 10 mL, Intravenous, PRN, Amrit Ramsey MD  •  vitamin D3 capsule 5,000 Units, 5,000 Units, Oral, Daily, Amrit Ramsey MD, 5,000 Units at 07/20/21 0841    Data Review:  All labs (24hrs): No results found for this or any previous visit (from the past 24 hour(s)).     Imaging:  XR Chest 1 View  Narrative: XR CHEST 1 VW-     Date of Exam: 7/19/2021 3:35 PM     Indication: s/p thoracentesis; J18.9-Pneumonia, unspecified organism;  R06.00-Dyspnea, unspecified; R09.02-Hypoxemia.     Comparison: 7/18/2021     Technique: A single view of the chest was obtained.     FINDINGS:      Heart size and pulmonary vessels are now within normal limits.   There is been interval improvement in interstitial markings and patchy  bilateral airspace disease consistent with improving pulmonary edema.   There is been decrease in size of bilateral pleural effusions.  There is  no evidence of pneumothorax.  There are surgical clips at the base of  the neck on the left unchanged from prior exam.           Impression:       1.  Interval improvement in pulmonary vascular congestion and bilateral  pulmonary interstitial and alveolar edema.  2.  Interval resolution of bilateral pleural effusions.  No evidence of  pneumothorax.        Electronically Signed By-Thomas Vera MD On:7/19/2021 3:39 PM  This report was finalized on [id number] by  Thomas Vera MD.  Adult Transthoracic Echo Complete W/ Cont if Necessary Per Protocol  · Left ventricular wall thickness is consistent with mild concentric   hypertrophy.  · Estimated left ventricular EF = 70% Estimated left ventricular EF was in   agreement with the calculated left ventricular EF. Left ventricular   systolic function is normal.  ·  Estimated right ventricular systolic pressure from tricuspid   regurgitation is normal (<35 mmHg).          ASSESSMENT:  Acute respiratory distress with hypoxia  Significant right pleural effusion  Lung infiltrate possible pneumonia  COPD    Paroxysmal A-fib  GERD     Hyperlipidemia  Essential hypertension  Polymyalgia   Hypothyroidism    PLAN:  PT OT   Encouraged to use I-S flutter valve  Continue  Antibiotics  Bronchodilator  Inhaled corticosteroids  Electrolytes/ glycemic control  DVT and GI prophylaxis.     William Cai MD, D,ABSM,   7/21/2021  08:16 EDT

## 2021-07-21 NOTE — PLAN OF CARE
Vitals are stable. Pt resting well. Will continue to monitor.  Problem: Adult Inpatient Plan of Care  Goal: Absence of Hospital-Acquired Illness or Injury  Intervention: Identify and Manage Fall Risk  Recent Flowsheet Documentation  Taken 7/21/2021 0100 by Natalie Bose LPN  Safety Promotion/Fall Prevention:   activity supervised   assistive device/personal items within reach   clutter free environment maintained   fall prevention program maintained   lighting adjusted   mobility aid in reach   nonskid shoes/slippers when out of bed   room organization consistent   safety round/check completed  Taken 7/20/2021 2300 by Natalie Bose LPN  Safety Promotion/Fall Prevention:   activity supervised   assistive device/personal items within reach   clutter free environment maintained   fall prevention program maintained   lighting adjusted   mobility aid in reach   nonskid shoes/slippers when out of bed   room organization consistent   safety round/check completed  Taken 7/20/2021 2100 by Natalie Bose LPN  Safety Promotion/Fall Prevention:   activity supervised   assistive device/personal items within reach   clutter free environment maintained   fall prevention program maintained   lighting adjusted   mobility aid in reach   nonskid shoes/slippers when out of bed   room organization consistent   safety round/check completed  Taken 7/20/2021 1901 by Natalie Bose LPN  Safety Promotion/Fall Prevention:   activity supervised   assistive device/personal items within reach   clutter free environment maintained   fall prevention program maintained   lighting adjusted   mobility aid in reach   nonskid shoes/slippers when out of bed   room organization consistent   safety round/check completed  Intervention: Prevent Skin Injury  Recent Flowsheet Documentation  Taken 7/21/2021 0100 by Natalie Bose LPN  Body Position: position changed independently  Taken 7/20/2021 2300 by Natalie Bose LPN  Body Position:  position changed independently  Taken 7/20/2021 2100 by Natalie Bose LPN  Body Position: position changed independently  Taken 7/20/2021 1901 by Natalie Bose LPN  Body Position: position changed independently  Intervention: Prevent and Manage VTE (venous thromboembolism) Risk  Recent Flowsheet Documentation  Taken 7/20/2021 1901 by Natalie Bose LPN  VTE Prevention/Management:   bilateral   sequential compression devices off  Intervention: Prevent Infection  Recent Flowsheet Documentation  Taken 7/21/2021 0100 by Natalie Bose LPN  Infection Prevention:   visitors restricted/screened   single patient room provided   rest/sleep promoted   personal protective equipment utilized   hand hygiene promoted  Taken 7/20/2021 2300 by Natalie Bose LPN  Infection Prevention:   visitors restricted/screened   single patient room provided   rest/sleep promoted   personal protective equipment utilized   hand hygiene promoted  Taken 7/20/2021 2100 by Natalie Bose LPN  Infection Prevention:   visitors restricted/screened   single patient room provided   rest/sleep promoted   personal protective equipment utilized   hand hygiene promoted  Taken 7/20/2021 1901 by Natalie Bose LPN  Infection Prevention:   visitors restricted/screened   single patient room provided   rest/sleep promoted   personal protective equipment utilized   hand hygiene promoted  Goal: Optimal Comfort and Wellbeing  Intervention: Provide Person-Centered Care  Recent Flowsheet Documentation  Taken 7/20/2021 1901 by Natalie Bose LPN  Trust Relationship/Rapport:   care explained   questions answered   questions encouraged     Problem: COPD Comorbidity  Goal: Maintenance of COPD Symptom Control  Intervention: Maintain COPD-Symptom Control  Recent Flowsheet Documentation  Taken 7/21/2021 0100 by Natalie Bose LPN  Medication Review/Management: medications reviewed  Taken 7/20/2021 2300 by Natalie Bose LPN  Medication  Review/Management: medications reviewed  Taken 7/20/2021 2100 by Natalie Bose LPN  Medication Review/Management: medications reviewed  Taken 7/20/2021 1901 by Natalie Bose LPN  Medication Review/Management: medications reviewed     Problem: Fall Injury Risk  Goal: Absence of Fall and Fall-Related Injury  Intervention: Identify and Manage Contributors to Fall Injury Risk  Recent Flowsheet Documentation  Taken 7/21/2021 0100 by Natalie Bose LPN  Medication Review/Management: medications reviewed  Taken 7/20/2021 2300 by Natalie Bose LPN  Medication Review/Management: medications reviewed  Taken 7/20/2021 2100 by Natalie Bose LPN  Medication Review/Management: medications reviewed  Taken 7/20/2021 1901 by Natalie Bose LPN  Medication Review/Management: medications reviewed  Intervention: Promote Injury-Free Environment  Recent Flowsheet Documentation  Taken 7/21/2021 0100 by Natalie Bose LPN  Safety Promotion/Fall Prevention:   activity supervised   assistive device/personal items within reach   clutter free environment maintained   fall prevention program maintained   lighting adjusted   mobility aid in reach   nonskid shoes/slippers when out of bed   room organization consistent   safety round/check completed  Taken 7/20/2021 2300 by Natalie Bose LPN  Safety Promotion/Fall Prevention:   activity supervised   assistive device/personal items within reach   clutter free environment maintained   fall prevention program maintained   lighting adjusted   mobility aid in reach   nonskid shoes/slippers when out of bed   room organization consistent   safety round/check completed  Taken 7/20/2021 2100 by Natalie Bose LPN  Safety Promotion/Fall Prevention:   activity supervised   assistive device/personal items within reach   clutter free environment maintained   fall prevention program maintained   lighting adjusted   mobility aid in reach   nonskid shoes/slippers when out of bed    room organization consistent   safety round/check completed  Taken 7/20/2021 1901 by Natalie Bose LPN  Safety Promotion/Fall Prevention:   activity supervised   assistive device/personal items within reach   clutter free environment maintained   fall prevention program maintained   lighting adjusted   mobility aid in reach   nonskid shoes/slippers when out of bed   room organization consistent   safety round/check completed   Goal Outcome Evaluation:

## 2021-07-21 NOTE — CASE MANAGEMENT/SOCIAL WORK
Continued Stay Note  SANDRA Christensen     Patient Name: Gali Dover  MRN: [id number]  Today's Date: 7/21/2021    Admit Date: 7/18/2021    Discharge Plan     Row Name 07/21/21 1401       Plan    Plan  PT/OT recommending IP rehab. Patient family first choice [facility]. [facility] accepted. Bed available today and tomorrow per liaison. No precert needed. [facility] approved.    Provided Post Acute Provider List?  Yes    Post Acute Provider List  Inpatient Rehab    Delivered To  Patient;Support Person    Support Person  Son    Method of Delivery  In person    Patient/Family in Agreement with Plan  yes    Plan Comments  Met with patient and family at bedside. List of IP rehab facilities given. Family's first choice [facility]. Second choice [facility]. Spoke with son Les on phone to let him know that [facility] approved. Son and patient are still agreeable to the plan.        Met with patient in room wearing PPE: mask, face shield/goggles, gloves, gown.      Maintained distance greater than six feet and spent less than 15 minutes in the room.        Expected Discharge Date and Time     Expected Discharge Date Expected Discharge Time    Jul 21, 2021             Sheyla Hyde RN

## 2021-07-22 VITALS
HEART RATE: 69 BPM | DIASTOLIC BLOOD PRESSURE: 53 MMHG | BODY MASS INDEX: 26.99 KG/M2 | RESPIRATION RATE: 18 BRPM | HEIGHT: 67 IN | SYSTOLIC BLOOD PRESSURE: 168 MMHG | WEIGHT: 171.96 LBS | TEMPERATURE: 99.8 F | OXYGEN SATURATION: 96 %

## 2021-07-22 LAB
BACTERIA FLD CULT: NORMAL
GRAM STN SPEC: NORMAL
GRAM STN SPEC: NORMAL

## 2021-07-22 PROCEDURE — 63710000001 ONDANSETRON PER 8 MG: Performed by: INTERNAL MEDICINE

## 2021-07-22 PROCEDURE — 94799 UNLISTED PULMONARY SVC/PX: CPT

## 2021-07-22 PROCEDURE — 25010000002 CEFEPIME PER 500 MG: Performed by: INTERNAL MEDICINE

## 2021-07-22 PROCEDURE — 25010000002 ONDANSETRON PER 1 MG: Performed by: INTERNAL MEDICINE

## 2021-07-22 PROCEDURE — 97535 SELF CARE MNGMENT TRAINING: CPT

## 2021-07-22 PROCEDURE — 99239 HOSP IP/OBS DSCHRG MGMT >30: CPT | Performed by: INTERNAL MEDICINE

## 2021-07-22 RX ORDER — LEVOFLOXACIN 750 MG/1
750 TABLET ORAL DAILY
Qty: 7 TABLET | Refills: 0 | Status: SHIPPED | OUTPATIENT
Start: 2021-07-22 | End: 2021-07-29

## 2021-07-22 RX ORDER — GABAPENTIN 400 MG/1
400 CAPSULE ORAL 3 TIMES DAILY
Qty: 90 CAPSULE | Refills: 2 | Status: ON HOLD | OUTPATIENT
Start: 2021-07-22 | End: 2022-08-16

## 2021-07-22 RX ORDER — OXYCODONE HYDROCHLORIDE 5 MG/1
5 TABLET ORAL EVERY 8 HOURS PRN
Qty: 12 TABLET | Refills: 0 | Status: SHIPPED | OUTPATIENT
Start: 2021-07-22 | End: 2021-07-26

## 2021-07-22 RX ADMIN — IPRATROPIUM BROMIDE AND ALBUTEROL SULFATE 3 ML: 2.5; .5 SOLUTION RESPIRATORY (INHALATION) at 10:42

## 2021-07-22 RX ADMIN — MONTELUKAST 10 MG: 10 TABLET, FILM COATED ORAL at 21:35

## 2021-07-22 RX ADMIN — CYCLOSPORINE 1 DROP: 0.5 EMULSION OPHTHALMIC at 21:35

## 2021-07-22 RX ADMIN — FUROSEMIDE 10 MG: 20 TABLET ORAL at 09:25

## 2021-07-22 RX ADMIN — DIGOXIN 250 MCG: 250 TABLET ORAL at 09:25

## 2021-07-22 RX ADMIN — BUDESONIDE 0.5 MG: 0.5 SUSPENSION RESPIRATORY (INHALATION) at 06:55

## 2021-07-22 RX ADMIN — BUDESONIDE 0.5 MG: 0.5 SUSPENSION RESPIRATORY (INHALATION) at 20:15

## 2021-07-22 RX ADMIN — DOCUSATE SODIUM 50 MG AND SENNOSIDES 8.6 MG 2 TABLET: 8.6; 5 TABLET, FILM COATED ORAL at 21:34

## 2021-07-22 RX ADMIN — HYDROCODONE BITARTRATE AND ACETAMINOPHEN 1 TABLET: 5; 325 TABLET ORAL at 11:49

## 2021-07-22 RX ADMIN — DOCUSATE SODIUM 50 MG AND SENNOSIDES 8.6 MG 2 TABLET: 8.6; 5 TABLET, FILM COATED ORAL at 09:26

## 2021-07-22 RX ADMIN — AZELASTINE HYDROCHLORIDE 1 SPRAY: 137 SPRAY, METERED NASAL at 21:34

## 2021-07-22 RX ADMIN — GABAPENTIN 300 MG: 300 CAPSULE ORAL at 21:35

## 2021-07-22 RX ADMIN — GUAIFENESIN 600 MG: 600 TABLET, EXTENDED RELEASE ORAL at 09:26

## 2021-07-22 RX ADMIN — HYDROCODONE BITARTRATE AND ACETAMINOPHEN 1 TABLET: 5; 325 TABLET ORAL at 18:24

## 2021-07-22 RX ADMIN — PANTOPRAZOLE SODIUM 40 MG: 40 TABLET, DELAYED RELEASE ORAL at 09:27

## 2021-07-22 RX ADMIN — NIFEDIPINE 60 MG: 30 TABLET, FILM COATED, EXTENDED RELEASE ORAL at 09:26

## 2021-07-22 RX ADMIN — CALCIUM CARBONATE-VITAMIN D TAB 500 MG-200 UNIT 1 TABLET: 500-200 TAB at 09:26

## 2021-07-22 RX ADMIN — Medication 10 ML: at 09:28

## 2021-07-22 RX ADMIN — Medication 250 MG: at 09:26

## 2021-07-22 RX ADMIN — ESCITALOPRAM OXALATE 10 MG: 10 TABLET ORAL at 09:26

## 2021-07-22 RX ADMIN — Medication 2.5 MG: at 01:27

## 2021-07-22 RX ADMIN — LEVOTHYROXINE SODIUM 25 MCG: 0.03 TABLET ORAL at 05:38

## 2021-07-22 RX ADMIN — OXYCODONE HYDROCHLORIDE 5 MG: 5 TABLET ORAL at 01:27

## 2021-07-22 RX ADMIN — ATORVASTATIN CALCIUM 20 MG: 20 TABLET, FILM COATED ORAL at 09:26

## 2021-07-22 RX ADMIN — LOSARTAN POTASSIUM 100 MG: 50 TABLET, FILM COATED ORAL at 09:25

## 2021-07-22 RX ADMIN — HYDROCODONE BITARTRATE AND ACETAMINOPHEN 1 TABLET: 5; 325 TABLET ORAL at 05:38

## 2021-07-22 RX ADMIN — IPRATROPIUM BROMIDE AND ALBUTEROL SULFATE 3 ML: 2.5; .5 SOLUTION RESPIRATORY (INHALATION) at 06:55

## 2021-07-22 RX ADMIN — IPRATROPIUM BROMIDE AND ALBUTEROL SULFATE 3 ML: 2.5; .5 SOLUTION RESPIRATORY (INHALATION) at 20:10

## 2021-07-22 RX ADMIN — GABAPENTIN 300 MG: 300 CAPSULE ORAL at 18:24

## 2021-07-22 RX ADMIN — ASPIRIN 325 MG ORAL TABLET 325 MG: 325 PILL ORAL at 09:26

## 2021-07-22 RX ADMIN — Medication 5000 UNITS: at 09:26

## 2021-07-22 RX ADMIN — ONDANSETRON 4 MG: 2 INJECTION INTRAMUSCULAR; INTRAVENOUS at 01:27

## 2021-07-22 RX ADMIN — GABAPENTIN 300 MG: 300 CAPSULE ORAL at 09:26

## 2021-07-22 RX ADMIN — CEFEPIME 2 G: 2 INJECTION, POWDER, FOR SOLUTION INTRAVENOUS at 09:24

## 2021-07-22 RX ADMIN — METOPROLOL SUCCINATE 100 MG: 50 TABLET, EXTENDED RELEASE ORAL at 09:25

## 2021-07-22 RX ADMIN — PREDNISOLONE ACETATE 1 DROP: 10 SUSPENSION/ DROPS OPHTHALMIC at 21:34

## 2021-07-22 RX ADMIN — HYDRALAZINE HYDROCHLORIDE 100 MG: 25 TABLET, FILM COATED ORAL at 18:23

## 2021-07-22 RX ADMIN — HYDRALAZINE HYDROCHLORIDE 100 MG: 25 TABLET, FILM COATED ORAL at 21:41

## 2021-07-22 RX ADMIN — CEFEPIME 2 G: 2 INJECTION, POWDER, FOR SOLUTION INTRAVENOUS at 01:27

## 2021-07-22 RX ADMIN — AZELASTINE HYDROCHLORIDE 1 SPRAY: 137 SPRAY, METERED NASAL at 09:28

## 2021-07-22 RX ADMIN — GUAIFENESIN 600 MG: 600 TABLET, EXTENDED RELEASE ORAL at 21:34

## 2021-07-22 RX ADMIN — HYDRALAZINE HYDROCHLORIDE 100 MG: 25 TABLET, FILM COATED ORAL at 09:26

## 2021-07-22 RX ADMIN — CYCLOSPORINE 1 DROP: 0.5 EMULSION OPHTHALMIC at 09:27

## 2021-07-22 RX ADMIN — ONDANSETRON HYDROCHLORIDE 4 MG: 4 TABLET, FILM COATED ORAL at 11:53

## 2021-07-22 RX ADMIN — IPRATROPIUM BROMIDE AND ALBUTEROL SULFATE 3 ML: 2.5; .5 SOLUTION RESPIRATORY (INHALATION) at 15:10

## 2021-07-22 NOTE — CASE MANAGEMENT/SOCIAL WORK
Continued Stay Note  SANDRA Fermin     Patient Name: Gali Dover  MRN: [id number]  Today's Date: 7/22/2021    Admit Date: 7/18/2021    Discharge Plan     Row Name 07/22/21 1534       Plan    Plan  Plan for discharge to [facility]. Accepted. No Precert required. [facility] approved. Bed availabiltiy confirmed with liaison.    Patient/Family in Agreement with Plan  yes    Plan Comments  Met with patient in room. Delivered updated IM information. Patient declined second copy.    Final Discharge Disposition Code  62 - inpatient rehab facility    Final Note  Patient discharged to [facility].        Met with patient in room wearing PPE: mask, face shield/goggles, gloves, gown.      Maintained distance greater than six feet and spent less than 15 minutes in the room.        Expected Discharge Date and Time     Expected Discharge Date Expected Discharge Time    Jul 22, 2021             Sheyla Hyde RN

## 2021-07-22 NOTE — PLAN OF CARE
Assessment: Gali Dover presents with ADL impairments below baseline abilities which indicate the need for continued skilled intervention while inpatient. Pt declined OOb ADLs secondary to B foot pain. Pt CBA for oral hygiene sitting at EOB. Tolerating session today without incident. Will continue to follow and progress as tolerated.     Plan/Recommendations:   Pt would benefit from Inpatient Rehabilitation placement at discharge from facility.   Pt desires Inpatient Rehabilitation placement at discharge. Pt cooperative; agreeable to therapeutic recommendations and plan of care.

## 2021-07-22 NOTE — NURSING NOTE
Called MD Ramsey regarding blood pressure reading of 182/68. No new orders given due to diastolic is not greater than 80.

## 2021-07-22 NOTE — THERAPY TREATMENT NOTE
Subjective: Pt agreeable to therapeutic plan of care.  Cognition: oriented to Person, Place, Time and Situation    Objective:     Bed Mobility: SBA  Functional Transfers: N/A or Not attempted.  Functional Ambulation: N/A or Not attempted.    Grooming: SBA  ADL Position: edge of bed sitting  ADL Comments: oral hygiene and brushing hair       Pain: 5 VAS B foot pain   Education: Provided education on importance of mobility and skilled verbal / tactile cueing throughout intervention.     Assessment: Gali Dover presents with ADL impairments below baseline abilities which indicate the need for continued skilled intervention while inpatient. Pt declined OOb ADLs secondary to B foot pain. Pt CBA for oral hygiene sitting at EOB. Tolerating session today without incident. Will continue to follow and progress as tolerated.     Plan/Recommendations:   Pt would benefit from Inpatient Rehabilitation placement at discharge from facility.   Pt desires Inpatient Rehabilitation placement at discharge. Pt cooperative; agreeable to therapeutic recommendations and plan of care.     Modified [place]: N/A = No pre-op stroke/TIA    Post-Tx Position: Staff Present, Alarms activated and Call light and personal items within reach  PPE: gloves, surgical mask, eyewear protection

## 2021-07-22 NOTE — NURSING NOTE
Contacted MD Ramsey due to BP 99/64 and patient wanting pain medication. New MD order to bolus 1000 ml NS then give pain medication.

## 2021-07-22 NOTE — PLAN OF CARE
Pt finally resting. Pt has had a difficult time with pain management due to her neuropathy. Norco and Oxy have been rotated to keep pain under control. BP was a little elevated earlier. Will continue to monitor  Problem: Adult Inpatient Plan of Care  Goal: Absence of Hospital-Acquired Illness or Injury  Intervention: Identify and Manage Fall Risk  Recent Flowsheet Documentation  Taken 7/22/2021 0127 by Natalie Bose LPN  Safety Promotion/Fall Prevention:   activity supervised   assistive device/personal items within reach   clutter free environment maintained   fall prevention program maintained   lighting adjusted   mobility aid in reach   nonskid shoes/slippers when out of bed   room organization consistent   safety round/check completed  Taken 7/21/2021 2300 by Natalie Bose LPN  Safety Promotion/Fall Prevention:   activity supervised   assistive device/personal items within reach   clutter free environment maintained   fall prevention program maintained   lighting adjusted   mobility aid in reach   nonskid shoes/slippers when out of bed   room organization consistent   safety round/check completed  Taken 7/21/2021 2100 by Natalie Bose LPN  Safety Promotion/Fall Prevention:   activity supervised   assistive device/personal items within reach   clutter free environment maintained   fall prevention program maintained   lighting adjusted   mobility aid in reach   nonskid shoes/slippers when out of bed   room organization consistent   safety round/check completed  Taken 7/21/2021 1901 by Natalie Bose LPN  Safety Promotion/Fall Prevention:   activity supervised   assistive device/personal items within reach   clutter free environment maintained   fall prevention program maintained   lighting adjusted   mobility aid in reach   nonskid shoes/slippers when out of bed   room organization consistent   safety round/check completed  Intervention: Prevent Skin Injury  Recent Flowsheet Documentation  Taken  7/22/2021 0127 by Natalie Bose LPN  Body Position: position changed independently  Taken 7/21/2021 2100 by Natalie Bose LPN  Body Position: position changed independently  Taken 7/21/2021 1901 by Natalie Bose LPN  Body Position: position changed independently  Intervention: Prevent and Manage VTE (venous thromboembolism) Risk  Recent Flowsheet Documentation  Taken 7/21/2021 1901 by Natalie Bose LPN  VTE Prevention/Management: sequential compression devices off  Intervention: Prevent Infection  Recent Flowsheet Documentation  Taken 7/22/2021 0127 by Natalie Bose LPN  Infection Prevention:   visitors restricted/screened   single patient room provided   rest/sleep promoted   personal protective equipment utilized   hand hygiene promoted  Taken 7/21/2021 2300 by Natalie Bose LPN  Infection Prevention:   visitors restricted/screened   single patient room provided   rest/sleep promoted   personal protective equipment utilized   hand hygiene promoted  Taken 7/21/2021 2100 by Natalie Bose LPN  Infection Prevention:   visitors restricted/screened   single patient room provided   rest/sleep promoted   personal protective equipment utilized   hand hygiene promoted  Taken 7/21/2021 1901 by Natalie Bose LPN  Infection Prevention:   visitors restricted/screened   single patient room provided   rest/sleep promoted   personal protective equipment utilized   hand hygiene promoted  Goal: Optimal Comfort and Wellbeing  Intervention: Provide Person-Centered Care  Recent Flowsheet Documentation  Taken 7/21/2021 1901 by Natalie Bose LPN  Trust Relationship/Rapport:   care explained   questions answered   questions encouraged     Problem: COPD Comorbidity  Goal: Maintenance of COPD Symptom Control  Intervention: Maintain COPD-Symptom Control  Recent Flowsheet Documentation  Taken 7/22/2021 0127 by Natalie Bose LPN  Medication Review/Management: medications reviewed  Taken 7/21/2021 2300 by  Natalie Bose LPN  Medication Review/Management: medications reviewed  Taken 7/21/2021 2100 by Natalie Bose LPN  Medication Review/Management: medications reviewed  Taken 7/21/2021 1901 by Natalie Bose LPN  Medication Review/Management: medications reviewed     Problem: Fall Injury Risk  Goal: Absence of Fall and Fall-Related Injury  Intervention: Identify and Manage Contributors to Fall Injury Risk  Recent Flowsheet Documentation  Taken 7/22/2021 0127 by Natalie Bose LPN  Medication Review/Management: medications reviewed  Taken 7/21/2021 2300 by Natalie Bose LPN  Medication Review/Management: medications reviewed  Taken 7/21/2021 2100 by Natalie Bose LPN  Medication Review/Management: medications reviewed  Taken 7/21/2021 1901 by Natalie Bose LPN  Medication Review/Management: medications reviewed  Intervention: Promote Injury-Free Environment  Recent Flowsheet Documentation  Taken 7/22/2021 0127 by Natalie Bose LPN  Safety Promotion/Fall Prevention:   activity supervised   assistive device/personal items within reach   clutter free environment maintained   fall prevention program maintained   lighting adjusted   mobility aid in reach   nonskid shoes/slippers when out of bed   room organization consistent   safety round/check completed  Taken 7/21/2021 2300 by Natalie Bose LPN  Safety Promotion/Fall Prevention:   activity supervised   assistive device/personal items within reach   clutter free environment maintained   fall prevention program maintained   lighting adjusted   mobility aid in reach   nonskid shoes/slippers when out of bed   room organization consistent   safety round/check completed  Taken 7/21/2021 2100 by Natalie Bose LPN  Safety Promotion/Fall Prevention:   activity supervised   assistive device/personal items within reach   clutter free environment maintained   fall prevention program maintained   lighting adjusted   mobility aid in reach   nonskid  shoes/slippers when out of bed   room organization consistent   safety round/check completed  Taken 7/21/2021 1901 by Natalie Bose LPN  Safety Promotion/Fall Prevention:   activity supervised   assistive device/personal items within reach   clutter free environment maintained   fall prevention program maintained   lighting adjusted   mobility aid in reach   nonskid shoes/slippers when out of bed   room organization consistent   safety round/check completed     Problem: Respiratory Compromise (Pneumonia)  Goal: Effective Oxygenation and Ventilation  Intervention: Promote Airway Secretion Clearance  Recent Flowsheet Documentation  Taken 7/21/2021 2300 by Natalie Bose LPN  Cough And Deep Breathing: done independently per patient  Taken 7/21/2021 1901 by Natalie Bose LPN  Cough And Deep Breathing: done independently per patient  Intervention: Optimize Oxygenation and Ventilation  Recent Flowsheet Documentation  Taken 7/22/2021 0127 by Natalie Bose LPN  Head of Bed (HOB): HOB elevated  Taken 7/21/2021 2100 by Natalie Bose LPN  Head of Bed (HOB): HOB elevated  Taken 7/21/2021 1901 by Natalie Bose LPN  Head of Bed (HOB): HOB elevated   Goal Outcome Evaluation:

## 2021-07-22 NOTE — PROGRESS NOTES
"PULMONARY CRITICAL CARE [facility]  NOTE      PATIENT IDENTIFICATION:  Name: Gali Dover  MRN: [id number]  :  1945     Age: 76 y.o.  Sex: female    DATE OF Note:  2021   Referring Physician: Amrit Ramsey MD                  Subjective:   Feeling better, no SOB, no chest or abd pain, no bowel or bladder issues reported       Objective:  tMax 24 hrs: Temp (24hrs), Av.9 °F (36.6 °C), Min:97.4 °F (36.3 °C), Max:98.2 °F (36.8 °C)      Vitals Ranges:   Temp:  [97.4 °F (36.3 °C)-98.2 °F (36.8 °C)] 97.4 °F (36.3 °C)  Heart Rate:  [61-80] 77  Resp:  [16-18] 18  BP: (147-182)/(52-64) 182/64    Intake and Output Last 3 Shifts:   I/O last 3 completed shifts:  In: 580 [P.O.:480; IV Piggyback:100]  Out: 500 [Urine:500]    Exam:  BP (!) 182/64 (BP Location: Right arm, Patient Position: Sitting) Comment: nurse notified  Pulse 77   Temp 97.4 °F (36.3 °C) (Axillary)   Resp 18   Ht 170.2 cm (67\")   Wt 78 kg (171 lb 15.3 oz)   SpO2 100%   BMI 26.93 kg/m²     General Appearance: Alert   awake oriented not in distress  HEENT:  Normocephalic, without obvious abnormality, Conjunctiva/corneas clear,.  Normal external ear canals, Nares normal, no drainage     Neck:  Supple, symmetrical, trachea midline. No JVD.  Lungs /Chest wall:   Bilateral basal rhonchi, respirations unlabored symmetrical wall movement.     Heart:  Regular rate and rhythm, systolic murmur PMI left sternal border  Abdomen: Soft, non-tender, no masses, no organomegaly.    Extremities: Trace edema no clubbing or Cyanosis        Medications:    Current Facility-Administered Medications:   •  acetaminophen (TYLENOL) tablet 650 mg, 650 mg, Oral, Q4H PRN **OR** acetaminophen (TYLENOL) 160 MG/5ML solution 650 mg, 650 mg, Oral, Q4H PRN **OR** acetaminophen (TYLENOL) suppository 650 mg, 650 mg, Rectal, Q4H PRN, Amrit Ramsey MD  •  albuterol (PROVENTIL) nebulizer solution 0.083% 2.5 mg/3mL, 2.5 mg, Nebulization, Q4H PRN, Amrit Ramsey MD, 2.5 " mg at 07/19/21 0747  •  aspirin tablet 325 mg, 325 mg, Oral, Daily, Amrit Ramsey MD, 325 mg at 07/22/21 0926  •  atorvastatin (LIPITOR) tablet 20 mg, 20 mg, Oral, Daily, Amrit Ramsey MD, 20 mg at 07/22/21 0926  •  azelastine (ASTELIN) nasal spray 1 spray, 1 spray, Each Nare, BID, Amrit Ramsey MD, 1 spray at 07/22/21 0928  •  sennosides-docusate (PERICOLACE) 8.6-50 MG per tablet 2 tablet, 2 tablet, Oral, BID, 2 tablet at 07/22/21 0926 **AND** polyethylene glycol (MIRALAX) packet 17 g, 17 g, Oral, Daily PRN **AND** bisacodyl (DULCOLAX) EC tablet 5 mg, 5 mg, Oral, Daily PRN **AND** bisacodyl (DULCOLAX) suppository 10 mg, 10 mg, Rectal, Daily PRN, Amrit Ramsey MD  •  budesonide (PULMICORT) nebulizer solution 0.5 mg, 0.5 mg, Nebulization, BID - RT, William Cai MD, 0.5 mg at 07/22/21 0655  •  calcium 500 mg vitamin D 5 mcg (200 UT) per tablet 1 tablet, 1 tablet, Oral, Daily, Amrit Ramsey MD, 1 tablet at 07/22/21 0926  •  cefepime 2 gm IVPB in 100 ml NS (MBP), 2 g, Intravenous, Q8H, Amrit Ramsey MD, Last Rate: 0 mL/hr at 07/21/21 1311, 2 g at 07/22/21 0924  •  cycloSPORINE (RESTASIS) 0.05 % ophthalmic emulsion 1 drop, 1 drop, Both Eyes, BID, Amrit Ramsey MD, 1 drop at 07/22/21 0927  •  digoxin (LANOXIN) tablet 250 mcg, 250 mcg, Oral, Daily, Amrit Ramsey MD, 250 mcg at 07/22/21 0925  •  escitalopram (LEXAPRO) tablet 10 mg, 10 mg, Oral, Daily, Amrit Ramsey MD, 10 mg at 07/22/21 0926  •  furosemide (LASIX) tablet 10 mg, 10 mg, Oral, Daily, Amrit Ramsey MD, 10 mg at 07/22/21 0925  •  gabapentin (NEURONTIN) capsule 300 mg, 300 mg, Oral, TID, Amrit Ramsey MD, 300 mg at 07/22/21 0926  •  guaiFENesin (MUCINEX) 12 hr tablet 600 mg, 600 mg, Oral, Q12H, Amrit Ramsey MD, 600 mg at 07/22/21 0926  •  hydrALAZINE (APRESOLINE) tablet 100 mg, 100 mg, Oral, TID, Amrit Ramsey MD, 100 mg at 07/22/21 0926  •  HYDROcodone-acetaminophen (NORCO) 5-325 MG per tablet 1 tablet, 1  tablet, Oral, Q4H PRN, Amrit Ramsey MD, 1 tablet at 07/22/21 1149  •  ipratropium-albuterol (DUO-NEB) nebulizer solution 3 mL, 3 mL, Nebulization, Q4H - RT, William Cai MD, 3 mL at 07/22/21 1510  •  ketamine (KETALAR) 23 mg in sodium chloride 0.9 % 100 mL infusion, 0.3 mg/kg, Intravenous, Daily PRN **AND** Ketamine Vital Signs & Assessment, , , Per Order Details, Amrit Ramsey MD  •  levothyroxine (SYNTHROID, LEVOTHROID) tablet 25 mcg, 25 mcg, Oral, Q AM, Amrit Ramsey MD, 25 mcg at 07/22/21 0538  •  losartan (COZAAR) tablet 100 mg, 100 mg, Oral, Daily, Amrit Ramsey MD, 100 mg at 07/22/21 0925  •  melatonin tablet 2.5 mg, 2.5 mg, Oral, Nightly PRN, Amrit Ramsey MD, 2.5 mg at 07/22/21 0127  •  metoprolol succinate XL (TOPROL-XL) 24 hr tablet 100 mg, 100 mg, Oral, Daily, Amrit Ramsey MD, 100 mg at 07/22/21 0925  •  montelukast (SINGULAIR) tablet 10 mg, 10 mg, Oral, Nightly, Amrit Ramsey MD, 10 mg at 07/21/21 2257  •  NIFEdipine XL (PROCARDIA XL) 24 hr tablet 60 mg, 60 mg, Oral, Q24H, Amrit Ramsey MD, 60 mg at 07/22/21 0926  •  ondansetron (ZOFRAN) injection 4 mg, 4 mg, Intravenous, Q6H PRN, Amrit Ramsey MD, 4 mg at 07/22/21 0127  •  ondansetron (ZOFRAN) tablet 4 mg, 4 mg, Oral, Q6H PRN, Amrit Ramsey MD, 4 mg at 07/22/21 1153  •  oxyCODONE (ROXICODONE) immediate release tablet 5 mg, 5 mg, Oral, Q4H PRN, Amrit Ramsey MD, 5 mg at 07/22/21 0127  •  pantoprazole (PROTONIX) EC tablet 40 mg, 40 mg, Oral, QAM AC, Amrit Ramsey MD, 40 mg at 07/22/21 0927  •  Pharmacy Consult - Pharmacy to dose, , Does not apply, Continuous PRN, Amrit Ramsey MD  •  Pharmacy Consult for Antimicrobial Stewardship, , Does not apply, Once, Amrit Ramsey MD  •  prednisoLONE acetate (PRED FORTE) 1 % ophthalmic suspension 1 drop, 1 drop, Left Eye, Nightly, Amrit Ramsey MD, 1 drop at 07/21/21 5756  •  saccharomyces boulardii (FLORASTOR) capsule 250 mg, 250 mg, Oral, Daily,  Amrit Ramsey MD, 250 mg at 07/22/21 0926  •  [COMPLETED] Insert peripheral IV, , , Once **AND** sodium chloride 0.9 % flush 10 mL, 10 mL, Intravenous, PRN, Amrit Ramsey MD  •  sodium chloride 0.9 % flush 10 mL, 10 mL, Intravenous, Q12H, Amrit Ramsey MD, 10 mL at 07/22/21 0928  •  sodium chloride 0.9 % flush 10 mL, 10 mL, Intravenous, PRN, Amrit Ramsey MD  •  vitamin D3 capsule 5,000 Units, 5,000 Units, Oral, Daily, Amrit Ramsey MD, 5,000 Units at 07/22/21 0926    Data Review:  All labs (24hrs): No results found for this or any previous visit (from the past 24 hour(s)).     Imaging:  XR Chest 1 View  Narrative: XR CHEST 1 VW-     Date of Exam: 7/19/2021 3:35 PM     Indication: s/p thoracentesis; J18.9-Pneumonia, unspecified organism;  R06.00-Dyspnea, unspecified; R09.02-Hypoxemia.     Comparison: 7/18/2021     Technique: A single view of the chest was obtained.     FINDINGS:      Heart size and pulmonary vessels are now within normal limits.   There is been interval improvement in interstitial markings and patchy  bilateral airspace disease consistent with improving pulmonary edema.   There is been decrease in size of bilateral pleural effusions.  There is  no evidence of pneumothorax.  There are surgical clips at the base of  the neck on the left unchanged from prior exam.           Impression:       1.  Interval improvement in pulmonary vascular congestion and bilateral  pulmonary interstitial and alveolar edema.  2.  Interval resolution of bilateral pleural effusions.  No evidence of  pneumothorax.        Electronically Signed By-Thomas Vera MD On:7/19/2021 3:39 PM  This report was finalized on [id number] by  Thomas Vera MD.  Adult Transthoracic Echo Complete W/ Cont if Necessary Per Protocol  · Left ventricular wall thickness is consistent with mild concentric   hypertrophy.  · Estimated left ventricular EF = 70% Estimated left ventricular EF was in   agreement with the  calculated left ventricular EF. Left ventricular   systolic function is normal.  · Estimated right ventricular systolic pressure from tricuspid   regurgitation is normal (<35 mmHg).          ASSESSMENT:  Acute respiratory distress with hypoxia  Significant right pleural effusion  Lung infiltrate possible pneumonia  COPD    Paroxysmal A-fib  GERD     Hyperlipidemia  Essential hypertension  Polymyalgia   Hypothyroidism    PLAN:  May discharge to [place] for IP rehab  Encouraged to use I-S flutter valve  Continue  Antibiotics  Bronchodilator  Inhaled corticosteroids  Electrolytes/ glycemic control  DVT and GI prophylaxis     Discussed with Dr Trell Diaz, APRN  7/22/2021  16:15 EDT     I personally have examined  and interviewed the patient. I have reviewed the history, data, problems, assessment and plan with our NP.  Critical care time in direct medical management (   ) minutes  Electronically signed by William Cai MD, D,ABS, 07/22/21, 4:41 PM EDT.

## 2021-07-22 NOTE — DISCHARGE SUMMARY
[facility] Medicine Services  DISCHARGE SUMMARY    Patient Name: Gali Dover  : 1945  MRN: [id number]    Date of Admission: 2021  Date of Discharge: 2021  Primary Care Physician: Chris Pedro MD      Presenting Problem:   Hypoxia [R09.02]  Dyspnea, unspecified type [R06.00]  Pneumonia due to infectious organism, unspecified laterality, unspecified part of lung [J18.9]    Active and Resolved Hospital Problems:  Active Hospital Problems    Diagnosis POA   • HCAP (healthcare-associated pneumonia) [J18.9] Yes     Priority: High   • Acute respiratory failure with hypoxia (CMS/HCC) [J96.01] Yes     Priority: High   • Pneumonia due to infectious organism [J18.9] Yes   • Hypothyroid [E03.9] Yes   • Hyperlipidemia [E78.5] Yes   • Polymyalgia  [M35.3] Yes   • Paroxysmal atrial fibrillation (CMS/HCC) [I48.0] Yes   • COPD (chronic obstructive pulmonary disease) with chronic bronchitis (CMS/HCC) [J44.9] Yes   • GERD (gastroesophageal reflux disease) [K21.9] Yes   • Essential hypertension [I10] Yes      Resolved Hospital Problems   No resolved problems to display.         Hospital Course     Hospital Course:  Patient is a 75-year-old female with past medical history of asthma, COPD, GERD, neuropathy, low serum IgG, hypertension, hyperlipidemia, left eye disorder, thyroid disease, arthritis and anxiety disorder who presented to the emergency room because of shortness of breath.  Patient's family reported that her oxygen saturation was in the 72 percent on room air.  Patient's oxygen saturation in the emergency room was reported to be in the low 80s prior to administration of oxygen therapy.  Patient reported being in the hospital a couple of weeks ago when she was tested for home oxygen and she did not qualify for home oxygen.  Patient reported having shortness of breath since she was discharged from the hospital but worse in the last 2days.  Patient also complained  of cough.  Patient presented to the emergency room for further assessment.  Patient was seen in the emergency room and was diagnosed with HCAP and acute respiratory failure with hypoxia.  HCAP was treated with antibiotics.  Pulmonary consult was completed.  Acute respiratory failure with hypoxia was treated with oxygen therapy.  Hypothyroidism was treated with Synthroid.  Hyperlipidemia was treated with Lipitor.  Polymyalgia was stable.  Paroxysmal atrial fibrillation was monitored.  COPD was treated with nebs, albuterol.  GERD was treated with Protonix.  Hypertension was stable.  Appropriate patient's home medications were resumed in the hospital for other chronic medical conditions.  Physical debility was treated with physical therapy.  Patient reported significant improvement in her symptoms and requested to be discharged to rehab facility after over 4 days in the hospital.  Patient was advised to take her medications as prescribed.  Discharge medications are as per medication reconciliation list.  Patient was advised to follow-up with her primary care physician within 3 to 5 days of discharge.  Patient was advised to follow-up with pulmonary within 14 days of discharge.  Patient was advised to return to the emergency department if she experiences any recurrence of her symptoms.  Patient and family agreed with the plan and patient was discharged in a stable condition.        DISCHARGE Follow Up Recommendations for labs and diagnostics:     Patient was advised to follow-up with her primary care physician who will review her current medications.    Patient was advised to follow-up pulmonary who will review her pulmonary function.      Reasons For Change In Medications and Indications for New Medications:      Day of Discharge     Vital Signs:  Temp:  [97.4 °F (36.3 °C)-98.2 °F (36.8 °C)] 97.4 °F (36.3 °C)  Heart Rate:  [61-80] 67  Resp:  [16-18] 18  BP: (147-182)/(52-64) 182/64  Flow (L/min):  [2] 2    Physical  Exam:  Physical Exam  Vitals and nursing note reviewed.   Constitutional:       General: She is not in acute distress.     Appearance: She is not toxic-appearing.   HENT:      Head: Normocephalic.      Nose: Nose normal. No congestion or rhinorrhea.      Mouth/Throat:      Mouth: Mucous membranes are moist.      Pharynx: Oropharynx is clear. No oropharyngeal exudate or posterior oropharyngeal erythema.   Eyes:      Pupils: Pupils are equal, round, and reactive to light.   Cardiovascular:      Pulses: Normal pulses.      Heart sounds: Normal heart sounds. No murmur heard.   No friction rub. No gallop.       Comments: S1 and S2 present.  No tachycardia.  Pulmonary:      Effort: No respiratory distress.      Breath sounds: No wheezing, rhonchi or rales.   Chest:      Chest wall: No tenderness.   Abdominal:      General: Abdomen is flat. Bowel sounds are normal. There is no distension.      Palpations: Abdomen is soft.      Tenderness: There is no right CVA tenderness.   Musculoskeletal:         General: No swelling, tenderness, deformity or signs of injury.      Cervical back: Neck supple. No tenderness.      Right lower leg: No edema.      Left lower leg: No edema.   Skin:     Capillary Refill: Capillary refill takes less than 2 seconds.      Coloration: Skin is not jaundiced.      Findings: No bruising, lesion or rash.   Neurological:      Mental Status: She is alert.      Comments: No facial asymmetry noted.  Gait and station not tested.   Psychiatric:      Comments: No agitation.            Pertinent  and/or Most Recent Results     LAB RESULTS:      Lab 07/21/21  1405 07/19/21  0349 07/18/21  1432   WBC 5.70 9.10 7.20   HEMOGLOBIN 7.6* 7.9* 9.5*   HEMATOCRIT 23.0* 23.1* 28.7*   PLATELETS 342 356 410   NEUTROS ABS 3.30 6.90 4.75   LYMPHS ABS 1.00 1.00  --    MONOS ABS 0.60 0.60  --    EOS ABS 0.60* 0.60* 0.29   MCV 87.3 86.7 86.9   LDH  --  290*  --    PROTIME  --   --  10.9   APTT  --   --  21.1*         Lab  07/21/21  1405 07/19/21  0349 07/18/21  1432   SODIUM 136 137 135*   POTASSIUM 3.9 3.9 4.0   CHLORIDE 102 103 100   CO2 23.0 26.0 23.0   ANION GAP 11.0 8.0 12.0   BUN 5* 6* 8   CREATININE 0.61 0.62 0.60   GLUCOSE 150* 87 101*   CALCIUM 8.5* 8.2* 8.9   MAGNESIUM 1.6 1.7  --              Lab 07/18/21  1432   PROBNP 644.1   TROPONIN T 0.020   PROTIME 10.9   INR 0.98                 Brief Urine Lab Results     None        Microbiology Results (last 10 days)     Procedure Component Value - Date/Time    Body Fluid Culture - Body Fluid, Pleural Cavity [[id number]] Collected: 07/19/21 1502    Lab Status: Final result Specimen: Body Fluid from Pleural Cavity Updated: 07/22/21 0625     Body Fluid Culture No growth at 3 days     Gram Stain Rare (1+) WBCs seen      No organisms seen    MRSA Screen, PCR (Inpatient) - Swab, Nares [[id number]]  (Normal) Collected: 07/19/21 0806    Lab Status: Final result Specimen: Swab from Nares Updated: 07/19/21 1207     MRSA PCR No MRSA Detected    Blood Culture - Blood, Blood, Venous Line [[id number]] Collected: 07/18/21 1556    Lab Status: Preliminary result Specimen: Blood, Venous Line Updated: 07/21/21 1615     Blood Culture No growth at 3 days    Blood Culture - Blood, Arm, Right [[id number]] Collected: 07/18/21 1556    Lab Status: Preliminary result Specimen: Blood from Arm, Right Updated: 07/21/21 1615     Blood Culture No growth at 3 days    Respiratory Panel PCR w/COVID-19(SARS-CoV-2) JENNIFER/BRIAN/[facility]RA[facility]/PAD/COR/MAD/CATHERINE In-House, NP Swab in UTM/VTM, 3-4 HR TAT - Swab, Nasopharynx [[id number]]  (Normal) Collected: 07/18/21 1432    Lab Status: Final result Specimen: Swab from Nasopharynx Updated: 07/18/21 1526     ADENOVIRUS, PCR Not Detected     Coronavirus 229E Not Detected     Coronavirus HKU1 Not Detected     Coronavirus NL63 Not Detected     Coronavirus OC43 Not Detected     COVID19 Not Detected     Human Metapneumovirus Not Detected     Human Rhinovirus/Enterovirus Not Detected     Influenza A  PCR Not Detected     Influenza B PCR Not Detected     Parainfluenza Virus 1 Not Detected     Parainfluenza Virus 2 Not Detected     Parainfluenza Virus 3 Not Detected     Parainfluenza Virus 4 Not Detected     RSV, PCR Not Detected     Bordetella pertussis pcr Not Detected     Bordetella parapertussis PCR Not Detected     Chlamydophila pneumoniae PCR Not Detected     Mycoplasma pneumo by PCR Not Detected    Narrative:      In the setting of a positive respiratory panel with a viral infection PLUS a negative procalcitonin without other underlying concern for bacterial infection, consider observing off antibiotics or discontinuation of antibiotics and continue supportive care. If the respiratory panel is positive for atypical bacterial infection (Bordetella pertussis, Chlamydophila pneumoniae, or Mycoplasma pneumoniae), consider antibiotic de-escalation to target atypical bacterial infection.          XR Chest 1 View    Result Date: 7/19/2021  Impression:   1.  Interval improvement in pulmonary vascular congestion and bilateral pulmonary interstitial and alveolar edema. 2.  Interval resolution of bilateral pleural effusions.  No evidence of pneumothorax.   Electronically Signed By-Thomas Vera MD On:7/19/2021 3:39 PM This report was finalized on [id number] by  Thomas Vera MD.    XR Chest 1 View    Result Date: 7/18/2021  Impression: 1.Worsening appearance right basilar that could relate to underlying effusion as well as atelectasis. 2.Left basilar density that could relate to some underlying atelectasis and pleural effusion in this area. 3.Cardiomegaly 4.Prominence of central pulmonary markings which could reflect some vascular congestion.  Electronically Signed By-Mohamud Gomez MD On:7/18/2021 2:57 PM This report was finalized on [id number] by  Mohamud Gomez MD.    XR Chest 1 View    Result Date: 7/2/2021  Impression:  1. Increased congestive changes have developed throughout the lungs and central hilar  areas. No large effusion is noted.   Electronically Signed By-Steven Mayers MD On:7/2/2021 12:49 PM This report was finalized on [id number] by  Steven Mayers MD.    CT Chest Pulmonary Embolism    Result Date: 7/18/2021  Impression: 1. Moderate to large right-sided pleural effusion. There is a small left-sided pleural effusion. 2. Pulmonary edema. 3. Alveolar consolidation in the right lower lobe. This could represent consolidative right lower lobe pneumonia and/or atelectasis. Continuous radiographic follow-up to complete resolution is recommended. 4. Severe atherosclerotic disease. 5. Coronary artery calcifications. 6. Hepatomegaly with hepatic steatosis. 7. Emphysema. 8. No pulmonary embolus. Slot 63 Electronically signed by:  Ruiz Steele M.D.  7/18/2021 6:44 PM      Results for orders placed during the hospital encounter of 06/01/21    Duplex carotid ultrasound CAR    Interpretation Summary  · Proximal right internal carotid artery moderate stenosis.  · Proximal left internal carotid artery moderate stenosis.  · Left Vertebral: Retrograde flow noted.      Results for orders placed during the hospital encounter of 06/01/21    Duplex carotid ultrasound CAR    Interpretation Summary  · Proximal right internal carotid artery moderate stenosis.  · Proximal left internal carotid artery moderate stenosis.  · Left Vertebral: Retrograde flow noted.      Results for orders placed during the hospital encounter of 07/18/21    Adult Transthoracic Echo Complete W/ Cont if Necessary Per Protocol    Interpretation Summary  · Left ventricular wall thickness is consistent with mild concentric hypertrophy.  · Estimated left ventricular EF = 70% Estimated left ventricular EF was in agreement with the calculated left ventricular EF. Left ventricular systolic function is normal.  · Estimated right ventricular systolic pressure from tricuspid regurgitation is normal (<35 mmHg).      Labs Pending at Discharge:  Pending Labs      Order Current Status    Blood Culture - Blood, Arm, Right Preliminary result    Blood Culture - Blood, Blood, Venous Line Preliminary result          Procedures Performed    07/19 1624 BEDSIDE THORACENTESIS      Consults:   Consults     Date and Time Order Name Status Description    7/18/2021  8:10 PM Inpatient Pulmonology Consult Completed     7/18/2021  3:53 PM Hospitalist (on-call MD unless specified) Completed     7/1/2021 11:21 AM Inpatient Hospitalist Consult Completed     6/30/2021 11:28 AM Inpatient Intensivist Consult Completed             Discharge Details        Discharge Medications      New Medications      Instructions Start Date   levoFLOXacin 750 MG tablet  Commonly known as: Levaquin   750 mg, Oral, Daily      oxyCODONE 5 MG immediate release tablet  Commonly known as: ROXICODONE   5 mg, Oral, Every 8 Hours PRN         Changes to Medications      Instructions Start Date   gabapentin 400 MG capsule  Commonly known as: NEURONTIN  What changed:   medication strength  how much to take   400 mg, Oral, 3 Times Daily         Continue These Medications      Instructions Start Date   Actemra 162 MG/0.9ML solution prefilled syringe  Generic drug: Tocilizumab   1 syringe, Subcutaneous, Weekly, Monday       albuterol sulfate  (90 Base) MCG/ACT inhaler  Commonly known as: PROVENTIL HFA;VENTOLIN HFA;PROAIR HFA   2 puffs, Inhalation, Every 4 Hours PRN      aspirin 325 MG tablet   325 mg, Oral, Daily      azelastine 0.1 % nasal spray  Commonly known as: ASTELIN   1 spray, Nasal, 2 Times Daily, Use in each nostril as directed       budesonide-formoterol 160-4.5 MCG/ACT inhaler  Commonly known as: SYMBICORT   2 puffs, 2 Times Daily - RT      calcium carbonate 600 MG tablet  Commonly known as: OS-VANDANA   600 mg, Oral, Daily      cycloSPORINE 0.05 % ophthalmic emulsion  Commonly known as: RESTASIS   1 drop, Both Eyes, 2 Times Daily      digoxin 250 MCG tablet  Commonly known as: LANOXIN   250 mcg, Oral, Every  Night at Bedtime      escitalopram 10 MG tablet  Commonly known as: LEXAPRO   10 mg, Oral, Every Morning      fluticasone 50 MCG/ACT nasal spray  Commonly known as: FLONASE   2 sprays, Nasal, Every Morning      furosemide 20 MG tablet  Commonly known as: LASIX   10 mg, Oral, Every Morning      guaiFENesin 600 MG 12 hr tablet  Commonly known as: MUCINEX   600 mg, Oral, Every 12 Hours Scheduled      hydrALAZINE 100 MG tablet  Commonly known as: APRESOLINE   100 mg, Oral, 3 Times Daily, Take dos       HYDROcodone-acetaminophen 5-325 MG per tablet  Commonly known as: NORCO   1 tablet, Oral, Every 4 Hours PRN      immune globulin (human) 1 GM/10ML solution infusion  Commonly known as: GAMMAGARD   Intravenous, Once      levothyroxine 25 MCG tablet  Commonly known as: SYNTHROID, LEVOTHROID   25 mcg, Oral, Every Morning      losartan 100 MG tablet  Commonly known as: COZAAR   100 mg, Oral, Every Morning      loteprednol 0.5 % ophthalmic suspension  Commonly known as: LOTEMAX   1 drop, Left Eye, Every Night at Bedtime      metoprolol succinate  MG 24 hr tablet  Commonly known as: TOPROL-XL   100 mg, Oral, Every Morning      montelukast 10 MG tablet  Commonly known as: SINGULAIR   10 mg, Oral, Nightly      NIFEdipine XL 60 MG 24 hr tablet  Commonly known as: PROCARDIA XL   60 mg, Oral, 2 times daily      ondansetron 4 MG tablet  Commonly known as: Zofran   4 mg, Oral, Every 6 Hours PRN      PrilOSEC 20 MG capsule  Generic drug: omeprazole   20 mg, Oral, Every Morning      PROBIOTIC PO   1 capsule, Oral, Daily      simvastatin 40 MG tablet  Commonly known as: ZOCOR   40 mg, Oral, Nightly      tiotropium 18 MCG per inhalation capsule  Commonly known as: SPIRIVA   1 capsule, Inhalation, Every Morning, Use and bring dos       Vitamin D 50 MCG (2000 UT) capsule   2,000 Units, Oral, Daily, A      Zetia 10 MG tablet  Generic drug: ezetimibe   10 mg, Oral, Every Night at Bedtime             Allergies   Allergen Reactions   •  Crestor [Rosuvastatin] Myalgia   • Lipitor [Atorvastatin] Myalgia   • Penicillins Hives   • Hctz [Hydrochlorothiazide] Rash         Discharge Disposition:  Rehab Facility or Unit (DC - External)    Diet:  Hospital:  Diet Order   Procedures   • Diet Regular         Discharge Activity: As tolerated.        CODE STATUS:  Code Status and Medical Interventions:   Ordered at: 07/18/21 4036     Level Of Support Discussed With:    Patient     Code Status:    CPR     Medical Interventions (Level of Support Prior to Arrest):    Full         Future Appointments   Date Time Provider Department Center   8/10/2021 11:30 AM [facility]/CLINIC  [facility]   8/10/2021 12:15 PM ROOM 1,  [facility]  [facility] None       Additional Instructions for the Follow-ups that You Need to Schedule     Ambulatory Referral to Home Health   As directed      Face to Face Visit Date: 7/19/2021    Follow-up provider for Plan of Care?: I will be treating the patient on an ongoing basis.  Please send me the Plan of Care for signature.    Follow-up provider: JACOB HALEY [[id number]]    Reason/Clinical Findings: resume    Describe mobility limitations that make leaving home difficult: tires easily    Nursing/Therapeutic Services Requested: Other (treat and eval)    Frequency: 1 Week 1                Time spent on Discharge including face to face service:  40 minutes    This patient has been examined wearing appropriate Personal Protective Equipment and discussed with hospital infection control department, [facility] department, infectious disease specialist and pulmonologist. 07/22/21      Signature: Electronically signed by Amrit Ramsey MD, 07/22/21, 3:29 PM EDT.

## 2021-07-23 LAB
BACTERIA SPEC AEROBE CULT: NORMAL
BACTERIA SPEC AEROBE CULT: NORMAL

## 2021-08-10 ENCOUNTER — APPOINTMENT (OUTPATIENT)
Dept: VASCULAR SURGERY | Facility: HOSPITAL | Age: 76
End: 2021-08-10

## 2021-08-10 ENCOUNTER — APPOINTMENT (OUTPATIENT)
Dept: CARDIOLOGY | Facility: HOSPITAL | Age: 76
End: 2021-08-10

## 2022-08-15 ENCOUNTER — HOSPITAL ENCOUNTER (INPATIENT)
Facility: HOSPITAL | Age: 77
LOS: 2 days | Discharge: HOME | End: 2022-08-18
Attending: EMERGENCY MEDICINE | Admitting: INTERNAL MEDICINE

## 2022-08-15 ENCOUNTER — APPOINTMENT (OUTPATIENT)
Dept: GENERAL RADIOLOGY | Facility: HOSPITAL | Age: 77
End: 2022-08-15

## 2022-08-15 DIAGNOSIS — R53.1 WEAKNESS: ICD-10-CM

## 2022-08-15 DIAGNOSIS — J18.9 PNEUMONIA DUE TO INFECTIOUS ORGANISM, UNSPECIFIED LATERALITY, UNSPECIFIED PART OF LUNG: ICD-10-CM

## 2022-08-15 DIAGNOSIS — J18.9 PNEUMONIA OF LEFT LOWER LOBE DUE TO INFECTIOUS ORGANISM: Primary | ICD-10-CM

## 2022-08-15 LAB
ANION GAP SERPL CALCULATED.3IONS-SCNC: 13.6 MMOL/L (ref 5–15)
BASOPHILS # BLD AUTO: 0.05 10*3/MM3 (ref 0–0.2)
BASOPHILS NFR BLD AUTO: 0.3 % (ref 0–1.5)
BUN SERPL-MCNC: 9 MG/DL (ref 8–23)
BUN/CREAT SERPL: 14.5 (ref 7–25)
CALCIUM SPEC-SCNC: 8.5 MG/DL (ref 8.6–10.5)
CHLORIDE SERPL-SCNC: 97 MMOL/L (ref 98–107)
CO2 SERPL-SCNC: 21.4 MMOL/L (ref 22–29)
CREAT SERPL-MCNC: 0.62 MG/DL (ref 0.57–1)
DEPRECATED RDW RBC AUTO: 45.9 FL (ref 37–54)
EGFRCR SERPLBLD CKD-EPI 2021: 91.3 ML/MIN/1.73
EOSINOPHIL # BLD AUTO: 0.08 10*3/MM3 (ref 0–0.4)
EOSINOPHIL NFR BLD AUTO: 0.5 % (ref 0.3–6.2)
ERYTHROCYTE [DISTWIDTH] IN BLOOD BY AUTOMATED COUNT: 14.4 % (ref 12.3–15.4)
FLUAV SUBTYP SPEC NAA+PROBE: NOT DETECTED
FLUBV RNA ISLT QL NAA+PROBE: NOT DETECTED
GLUCOSE SERPL-MCNC: 92 MG/DL (ref 65–99)
HCT VFR BLD AUTO: 29.2 % (ref 34–46.6)
HGB BLD-MCNC: 10.1 G/DL (ref 12–15.9)
IMM GRANULOCYTES # BLD AUTO: 0.26 10*3/MM3 (ref 0–0.05)
IMM GRANULOCYTES NFR BLD AUTO: 1.6 % (ref 0–0.5)
LYMPHOCYTES # BLD AUTO: 0.95 10*3/MM3 (ref 0.7–3.1)
LYMPHOCYTES NFR BLD AUTO: 6 % (ref 19.6–45.3)
MCH RBC QN AUTO: 29.9 PG (ref 26.6–33)
MCHC RBC AUTO-ENTMCNC: 34.6 G/DL (ref 31.5–35.7)
MCV RBC AUTO: 86.4 FL (ref 79–97)
MONOCYTES # BLD AUTO: 0.93 10*3/MM3 (ref 0.1–0.9)
MONOCYTES NFR BLD AUTO: 5.9 % (ref 5–12)
NEUTROPHILS NFR BLD AUTO: 13.55 10*3/MM3 (ref 1.7–7)
NEUTROPHILS NFR BLD AUTO: 85.7 % (ref 42.7–76)
PLATELET # BLD AUTO: 670 10*3/MM3 (ref 140–450)
PMV BLD AUTO: 10.7 FL (ref 6–12)
POTASSIUM SERPL-SCNC: 3.8 MMOL/L (ref 3.5–5.2)
RBC # BLD AUTO: 3.38 10*6/MM3 (ref 3.77–5.28)
SARS-COV-2 RNA PNL SPEC NAA+PROBE: NOT DETECTED
SODIUM SERPL-SCNC: 132 MMOL/L (ref 136–145)
TROPONIN T SERPL-MCNC: <0.01 NG/ML (ref 0–0.03)
WBC NRBC COR # BLD: 15.82 10*3/MM3 (ref 3.4–10.8)

## 2022-08-15 PROCEDURE — 85025 COMPLETE CBC W/AUTO DIFF WBC: CPT | Performed by: EMERGENCY MEDICINE

## 2022-08-15 PROCEDURE — 99285 EMERGENCY DEPT VISIT HI MDM: CPT

## 2022-08-15 PROCEDURE — 80048 BASIC METABOLIC PNL TOTAL CA: CPT | Performed by: EMERGENCY MEDICINE

## 2022-08-15 PROCEDURE — 93010 ELECTROCARDIOGRAM REPORT: CPT | Performed by: EMERGENCY MEDICINE

## 2022-08-15 PROCEDURE — 87636 SARSCOV2 & INF A&B AMP PRB: CPT | Performed by: EMERGENCY MEDICINE

## 2022-08-15 PROCEDURE — 71046 X-RAY EXAM CHEST 2 VIEWS: CPT

## 2022-08-15 PROCEDURE — 84484 ASSAY OF TROPONIN QUANT: CPT | Performed by: EMERGENCY MEDICINE

## 2022-08-15 PROCEDURE — 93005 ELECTROCARDIOGRAM TRACING: CPT | Performed by: EMERGENCY MEDICINE

## 2022-08-16 PROBLEM — J18.9 PNEUMONIA OF LEFT LOWER LOBE DUE TO INFECTIOUS ORGANISM: Status: ACTIVE | Noted: 2022-08-16

## 2022-08-16 LAB
B PARAPERT DNA SPEC QL NAA+PROBE: NOT DETECTED
B PERT DNA SPEC QL NAA+PROBE: NOT DETECTED
C PNEUM DNA NPH QL NAA+NON-PROBE: NOT DETECTED
D-LACTATE SERPL-SCNC: 0.8 MMOL/L (ref 0.5–2)
DIGOXIN SERPL-MCNC: <0.3 NG/ML (ref 0.6–1.2)
FLUAV SUBTYP SPEC NAA+PROBE: NOT DETECTED
FLUBV RNA ISLT QL NAA+PROBE: NOT DETECTED
HADV DNA SPEC NAA+PROBE: NOT DETECTED
HCOV 229E RNA SPEC QL NAA+PROBE: NOT DETECTED
HCOV HKU1 RNA SPEC QL NAA+PROBE: NOT DETECTED
HCOV NL63 RNA SPEC QL NAA+PROBE: NOT DETECTED
HCOV OC43 RNA SPEC QL NAA+PROBE: NOT DETECTED
HMPV RNA NPH QL NAA+NON-PROBE: NOT DETECTED
HPIV1 RNA ISLT QL NAA+PROBE: NOT DETECTED
HPIV2 RNA SPEC QL NAA+PROBE: NOT DETECTED
HPIV3 RNA NPH QL NAA+PROBE: NOT DETECTED
HPIV4 P GENE NPH QL NAA+PROBE: NOT DETECTED
M PNEUMO IGG SER IA-ACNC: NOT DETECTED
RHINOVIRUS RNA SPEC NAA+PROBE: NOT DETECTED
RSV RNA NPH QL NAA+NON-PROBE: NOT DETECTED
SARS-COV-2 RNA NPH QL NAA+NON-PROBE: NOT DETECTED

## 2022-08-16 PROCEDURE — 99284 EMERGENCY DEPT VISIT MOD MDM: CPT | Performed by: EMERGENCY MEDICINE

## 2022-08-16 PROCEDURE — 25010000002 ENOXAPARIN PER 10 MG: Performed by: INTERNAL MEDICINE

## 2022-08-16 PROCEDURE — 25010000002 CEFTRIAXONE PER 250 MG: Performed by: EMERGENCY MEDICINE

## 2022-08-16 PROCEDURE — 80162 ASSAY OF DIGOXIN TOTAL: CPT | Performed by: EMERGENCY MEDICINE

## 2022-08-16 PROCEDURE — 87040 BLOOD CULTURE FOR BACTERIA: CPT | Performed by: EMERGENCY MEDICINE

## 2022-08-16 PROCEDURE — 99223 1ST HOSP IP/OBS HIGH 75: CPT | Performed by: INTERNAL MEDICINE

## 2022-08-16 PROCEDURE — 83605 ASSAY OF LACTIC ACID: CPT

## 2022-08-16 PROCEDURE — 0202U NFCT DS 22 TRGT SARS-COV-2: CPT | Performed by: NURSE PRACTITIONER

## 2022-08-16 PROCEDURE — 93010 ELECTROCARDIOGRAM REPORT: CPT | Performed by: INTERNAL MEDICINE

## 2022-08-16 PROCEDURE — 93005 ELECTROCARDIOGRAM TRACING: CPT | Performed by: NURSE PRACTITIONER

## 2022-08-16 PROCEDURE — 25010000002 AZITHROMYCIN PER 500 MG: Performed by: EMERGENCY MEDICINE

## 2022-08-16 PROCEDURE — 36415 COLL VENOUS BLD VENIPUNCTURE: CPT

## 2022-08-16 PROCEDURE — 94640 AIRWAY INHALATION TREATMENT: CPT

## 2022-08-16 PROCEDURE — 94799 UNLISTED PULMONARY SVC/PX: CPT

## 2022-08-16 RX ORDER — ATORVASTATIN CALCIUM 10 MG/1
10 TABLET, FILM COATED ORAL DAILY
Status: DISCONTINUED | OUTPATIENT
Start: 2022-08-16 | End: 2022-08-18 | Stop reason: HOSPADM

## 2022-08-16 RX ORDER — SODIUM CHLORIDE 9 MG/ML
75 INJECTION, SOLUTION INTRAVENOUS CONTINUOUS
Status: DISCONTINUED | OUTPATIENT
Start: 2022-08-16 | End: 2022-08-17

## 2022-08-16 RX ORDER — ALBUTEROL SULFATE 2.5 MG/3ML
2.5 SOLUTION RESPIRATORY (INHALATION) EVERY 6 HOURS PRN
Status: DISCONTINUED | OUTPATIENT
Start: 2022-08-16 | End: 2022-08-17

## 2022-08-16 RX ORDER — L.ACID,PARA/B.BIFIDUM/S.THERM 8B CELL
1 CAPSULE ORAL DAILY
Status: DISCONTINUED | OUTPATIENT
Start: 2022-08-17 | End: 2022-08-18 | Stop reason: HOSPADM

## 2022-08-16 RX ORDER — MONTELUKAST SODIUM 10 MG/1
10 TABLET ORAL NIGHTLY
Status: DISCONTINUED | OUTPATIENT
Start: 2022-08-16 | End: 2022-08-18 | Stop reason: HOSPADM

## 2022-08-16 RX ORDER — MELATONIN
2000 DAILY
Status: DISCONTINUED | OUTPATIENT
Start: 2022-08-17 | End: 2022-08-18 | Stop reason: HOSPADM

## 2022-08-16 RX ORDER — POTASSIUM CHLORIDE 750 MG/1
10 TABLET, FILM COATED, EXTENDED RELEASE ORAL DAILY
Status: DISCONTINUED | OUTPATIENT
Start: 2022-08-17 | End: 2022-08-18 | Stop reason: HOSPADM

## 2022-08-16 RX ORDER — AZELASTINE 1 MG/ML
1 SPRAY, METERED NASAL DAILY
Status: DISCONTINUED | OUTPATIENT
Start: 2022-08-17 | End: 2022-08-18 | Stop reason: HOSPADM

## 2022-08-16 RX ORDER — NITROGLYCERIN 0.4 MG/1
0.4 TABLET SUBLINGUAL
Status: DISCONTINUED | OUTPATIENT
Start: 2022-08-16 | End: 2022-08-18 | Stop reason: HOSPADM

## 2022-08-16 RX ORDER — PANTOPRAZOLE SODIUM 40 MG/1
80 TABLET, DELAYED RELEASE ORAL DAILY
COMMUNITY

## 2022-08-16 RX ORDER — ENOXAPARIN SODIUM 100 MG/ML
40 INJECTION SUBCUTANEOUS DAILY
Status: DISCONTINUED | OUTPATIENT
Start: 2022-08-16 | End: 2022-08-18 | Stop reason: HOSPADM

## 2022-08-16 RX ORDER — GUAIFENESIN AND CODEINE PHOSPHATE 100; 10 MG/5ML; MG/5ML
5 SOLUTION ORAL EVERY 4 HOURS PRN
Status: DISCONTINUED | OUTPATIENT
Start: 2022-08-16 | End: 2022-08-18 | Stop reason: HOSPADM

## 2022-08-16 RX ORDER — CHOLECALCIFEROL (VITAMIN D3) 125 MCG
5 CAPSULE ORAL NIGHTLY PRN
Status: DISCONTINUED | OUTPATIENT
Start: 2022-08-16 | End: 2022-08-18 | Stop reason: HOSPADM

## 2022-08-16 RX ORDER — BUDESONIDE AND FORMOTEROL FUMARATE DIHYDRATE 160; 4.5 UG/1; UG/1
2 AEROSOL RESPIRATORY (INHALATION)
Status: DISCONTINUED | OUTPATIENT
Start: 2022-08-16 | End: 2022-08-17

## 2022-08-16 RX ORDER — LEVOTHYROXINE SODIUM 0.03 MG/1
25 TABLET ORAL EVERY MORNING
Status: DISCONTINUED | OUTPATIENT
Start: 2022-08-17 | End: 2022-08-18 | Stop reason: HOSPADM

## 2022-08-16 RX ORDER — PANTOPRAZOLE SODIUM 40 MG/1
80 TABLET, DELAYED RELEASE ORAL DAILY
Status: DISCONTINUED | OUTPATIENT
Start: 2022-08-17 | End: 2022-08-18 | Stop reason: HOSPADM

## 2022-08-16 RX ORDER — POTASSIUM CHLORIDE 750 MG/1
10 TABLET, FILM COATED, EXTENDED RELEASE ORAL DAILY
COMMUNITY

## 2022-08-16 RX ORDER — DIGOXIN 250 MCG
250 TABLET ORAL DAILY
Status: DISCONTINUED | OUTPATIENT
Start: 2022-08-16 | End: 2022-08-18 | Stop reason: HOSPADM

## 2022-08-16 RX ORDER — HYDROCODONE BITARTRATE AND ACETAMINOPHEN 5; 325 MG/1; MG/1
1 TABLET ORAL EVERY 4 HOURS PRN
Status: DISCONTINUED | OUTPATIENT
Start: 2022-08-16 | End: 2022-08-18 | Stop reason: HOSPADM

## 2022-08-16 RX ORDER — NIFEDIPINE 30 MG/1
60 TABLET, EXTENDED RELEASE ORAL
Status: DISCONTINUED | OUTPATIENT
Start: 2022-08-17 | End: 2022-08-18 | Stop reason: HOSPADM

## 2022-08-16 RX ORDER — SODIUM CHLORIDE 0.9 % (FLUSH) 0.9 %
10 SYRINGE (ML) INJECTION EVERY 12 HOURS SCHEDULED
Status: DISCONTINUED | OUTPATIENT
Start: 2022-08-16 | End: 2022-08-18 | Stop reason: HOSPADM

## 2022-08-16 RX ORDER — METOPROLOL SUCCINATE 50 MG/1
50 TABLET, EXTENDED RELEASE ORAL EVERY MORNING
Status: DISCONTINUED | OUTPATIENT
Start: 2022-08-17 | End: 2022-08-18 | Stop reason: HOSPADM

## 2022-08-16 RX ORDER — CYCLOSPORINE 0.5 MG/ML
1 EMULSION OPHTHALMIC 2 TIMES DAILY
Status: DISCONTINUED | OUTPATIENT
Start: 2022-08-16 | End: 2022-08-18 | Stop reason: HOSPADM

## 2022-08-16 RX ORDER — SPIRONOLACTONE 25 MG/1
25 TABLET ORAL DAILY
COMMUNITY

## 2022-08-16 RX ORDER — ESCITALOPRAM OXALATE 10 MG/1
10 TABLET ORAL EVERY MORNING
Status: DISCONTINUED | OUTPATIENT
Start: 2022-08-17 | End: 2022-08-18 | Stop reason: HOSPADM

## 2022-08-16 RX ORDER — SODIUM CHLORIDE 0.9 % (FLUSH) 0.9 %
10 SYRINGE (ML) INJECTION AS NEEDED
Status: DISCONTINUED | OUTPATIENT
Start: 2022-08-16 | End: 2022-08-18 | Stop reason: HOSPADM

## 2022-08-16 RX ORDER — HYDRALAZINE HYDROCHLORIDE 25 MG/1
100 TABLET, FILM COATED ORAL 3 TIMES DAILY
Status: DISCONTINUED | OUTPATIENT
Start: 2022-08-16 | End: 2022-08-18 | Stop reason: HOSPADM

## 2022-08-16 RX ORDER — SODIUM CHLORIDE 9 MG/ML
1000 INJECTION, SOLUTION INTRAVENOUS CONTINUOUS
Status: DISCONTINUED | OUTPATIENT
Start: 2022-08-16 | End: 2022-08-16

## 2022-08-16 RX ORDER — GABAPENTIN 300 MG/1
600 CAPSULE ORAL 3 TIMES DAILY
COMMUNITY

## 2022-08-16 RX ORDER — ONDANSETRON 4 MG/1
4 TABLET, FILM COATED ORAL EVERY 6 HOURS PRN
Status: DISCONTINUED | OUTPATIENT
Start: 2022-08-16 | End: 2022-08-18 | Stop reason: HOSPADM

## 2022-08-16 RX ORDER — ASPIRIN 81 MG/1
81 TABLET ORAL DAILY
Status: DISCONTINUED | OUTPATIENT
Start: 2022-08-16 | End: 2022-08-18 | Stop reason: HOSPADM

## 2022-08-16 RX ORDER — SPIRONOLACTONE 25 MG/1
25 TABLET ORAL DAILY
Status: DISCONTINUED | OUTPATIENT
Start: 2022-08-17 | End: 2022-08-18 | Stop reason: HOSPADM

## 2022-08-16 RX ORDER — GABAPENTIN 300 MG/1
600 CAPSULE ORAL 3 TIMES DAILY
Status: DISCONTINUED | OUTPATIENT
Start: 2022-08-16 | End: 2022-08-18 | Stop reason: HOSPADM

## 2022-08-16 RX ORDER — LOSARTAN POTASSIUM 25 MG/1
25 TABLET ORAL EVERY MORNING
Status: DISCONTINUED | OUTPATIENT
Start: 2022-08-17 | End: 2022-08-18 | Stop reason: HOSPADM

## 2022-08-16 RX ORDER — FUROSEMIDE 20 MG/1
10 TABLET ORAL EVERY MORNING
Status: DISCONTINUED | OUTPATIENT
Start: 2022-08-17 | End: 2022-08-18 | Stop reason: HOSPADM

## 2022-08-16 RX ORDER — ONDANSETRON 2 MG/ML
4 INJECTION INTRAMUSCULAR; INTRAVENOUS EVERY 6 HOURS PRN
Status: DISCONTINUED | OUTPATIENT
Start: 2022-08-16 | End: 2022-08-18 | Stop reason: HOSPADM

## 2022-08-16 RX ADMIN — SODIUM CHLORIDE 75 ML/HR: 9 INJECTION, SOLUTION INTRAVENOUS at 18:03

## 2022-08-16 RX ADMIN — GUAIFENESIN AND CODEINE PHOSPHATE 5 ML: 10; 100 LIQUID ORAL at 21:16

## 2022-08-16 RX ADMIN — Medication 10 ML: at 21:10

## 2022-08-16 RX ADMIN — AZITHROMYCIN MONOHYDRATE 500 MG: 500 INJECTION, POWDER, LYOPHILIZED, FOR SOLUTION INTRAVENOUS at 01:42

## 2022-08-16 RX ADMIN — ENOXAPARIN SODIUM 40 MG: 100 INJECTION SUBCUTANEOUS at 18:17

## 2022-08-16 RX ADMIN — CYCLOSPORINE 1 DROP: 0.5 EMULSION OPHTHALMIC at 21:10

## 2022-08-16 RX ADMIN — CEFTRIAXONE SODIUM 1 G: 1 INJECTION, POWDER, FOR SOLUTION INTRAMUSCULAR; INTRAVENOUS at 01:39

## 2022-08-16 RX ADMIN — SODIUM CHLORIDE 1000 ML/HR: 9 INJECTION, SOLUTION INTRAVENOUS at 02:54

## 2022-08-16 RX ADMIN — HYDROCODONE BITARTRATE AND ACETAMINOPHEN 1 TABLET: 5; 325 TABLET ORAL at 21:16

## 2022-08-16 RX ADMIN — HYDRALAZINE HYDROCHLORIDE 100 MG: 25 TABLET, FILM COATED ORAL at 21:10

## 2022-08-16 RX ADMIN — GABAPENTIN 600 MG: 300 CAPSULE ORAL at 21:09

## 2022-08-16 RX ADMIN — MONTELUKAST 10 MG: 10 TABLET, FILM COATED ORAL at 21:10

## 2022-08-16 RX ADMIN — IPRATROPIUM BROMIDE 0.5 MG: 0.5 SOLUTION RESPIRATORY (INHALATION) at 19:54

## 2022-08-16 RX ADMIN — ATORVASTATIN CALCIUM 10 MG: 10 TABLET, FILM COATED ORAL at 21:09

## 2022-08-16 RX ADMIN — DIGOXIN 250 MCG: 250 TABLET ORAL at 21:10

## 2022-08-16 RX ADMIN — ASPIRIN 81 MG: 81 TABLET, COATED ORAL at 21:10

## 2022-08-16 NOTE — ED PROVIDER NOTES
Subjective   77-year-old female presents for evaluation.  Patient has a history of atrial fibrillation on aspirin.  No other anticoagulants due to history of GI bleed.  Also has a history of COPD on oxygen.  States that she been having cough and congestion since around July 20.  She has been through 2 rounds of antibiotics no prior steroids.  A couple times he ran fevers up to 101 over the past 3 weeks but none over the past 2 weeks.  Does have an appointment with a pulmonologist on Friday.  Presents with increasing generalized fatigue weakness and a nonproductive cough.  Shortness of breath has not gotten any worse.          Review of Systems   Constitutional: Negative for chills and fever.   HENT: Positive for congestion.    Respiratory: Positive for cough. Negative for shortness of breath.    Cardiovascular: Negative for chest pain and leg swelling.   Gastrointestinal: Negative for abdominal pain, nausea and vomiting.   Genitourinary: Negative for dysuria.   Skin: Negative for rash.   Neurological: Negative.  Negative for headaches.   Hematological: Negative.    Psychiatric/Behavioral: Negative.        Past Medical History:   Diagnosis Date   • Anxiety    • Arteritis (CMS/[facility])    • Asthma    • Constipation     off and on   • COPD (chronic obstructive pulmonary disease) (CMS/[facility])    • Disease of thyroid gland    • Disorder of left eye    • Dry eyes    • GERD (gastroesophageal reflux disease)    • Hyperlipidemia    • Hypertension    • 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    • Neuropathy    • Stricture of artery (CMS/[facility]) 06/2021       Allergies   Allergen Reactions   • Crestor [Rosuvastatin] Myalgia   • Lipitor [Atorvastatin] Myalgia   • Penicillins Hives   • Hctz [Hydrochlorothiazide] Rash       Past Surgical History:   Procedure Laterality Date   • CAROTID SUBCLAVIAN BYPASS Left 6/30/2021    Procedure: CAROTID SUBCLAVIAN BYPASS;  Surgeon: Navid Hassan MD;  Location: [facility] MAIN OR;  Service: Vascular;  Laterality: Left;    •  SECTION     • EYE SURGERY      cat ext   • HARDWARE REMOVAL Right     knee   • HYSTERECTOMY      still has ovaries   • KIDNEY SURGERY      stent placed    • KNEE ARTHROSCOPY Right    • LAPAROSCOPIC CHOLECYSTECTOMY         No family history on file.    Social History     Socioeconomic History   • Marital status:    Tobacco Use   • Smoking status: Former Smoker   Substance and Sexual Activity   • Alcohol use: Yes     Comment: social   • Drug use: Not Currently           Objective   Physical Exam  Vitals reviewed.   Constitutional:       Appearance: She is well-developed.   Eyes:      Extraocular Movements: Extraocular movements intact.   Cardiovascular:      Rate and Rhythm: Rhythm irregular.   Pulmonary:      Effort: Accessory muscle usage present. No respiratory distress.      Comments: Patient appears to have mild increased work of breathing although patient does not subjectively feel this way.  She is able to speak in complete sentences.  No specific consolidative pattern on exam.  Chest:      Chest wall: No tenderness or edema.   Abdominal:      General: Bowel sounds are normal.      Palpations: Abdomen is soft.   Musculoskeletal:      Cervical back: Neck supple.      Right lower leg: No edema.      Left lower leg: No edema.   Skin:     General: Skin is warm and dry.   Neurological:      General: No focal deficit present.      Mental Status: She is alert.   Psychiatric:         Mood and Affect: Mood normal.         Procedures           ED Course                                           MDM  Number of Diagnoses or Management Options  Pneumonia of left lower lobe due to infectious organism  Weakness  Diagnosis management comments: 77-year-old female presents with cough body aches weakness.  Patient does have 2 rounds of recent antibiotics currently on the second 1.  Differential would include COPD, cardiac complications, infectious complications.  Her initial blood pressure was normal at 149  systolic.  Respiratory rate was 14.  Do not suspect sepsis initially.  White count did come back elevated.  Apparently the patient had a blood pressure of 96 taken that was not relayed to me.  When I did repeat this blood pressure was 109/41.  Chest x-ray does show potential pneumonia.  Patient does have difficulty getting along at home with generalized weakness and cough.  Concern for pneumonia.  Lactate ordered.  1 L normal saline fluid bolus ordered.  IV antibiotics Rocephin and Zithromax ordered.    Approximately 12:30 AM I spoke with nurse practitioner for the hospitalist service at [facility].  They do not have beds available but the patient will be placed in line for admission.  Medical surge telemetry. Dr. Benavides [facility] physician. Pt understand and agree.     Low BP not confirmed. Lactate nml. No indication for sepsis fluid bolus           Amount and/or Complexity of Data Reviewed  Clinical lab tests: reviewed        Final diagnoses:   Pneumonia of left lower lobe due to infectious organism   Weakness       ED Disposition  ED Disposition     ED Disposition   Decision to Admit    Condition   --    Comment   --             No follow-up provider specified.       Medication List      No changes were made to your prescriptions during this visit.          Zeb Valenzuela MD  08/23/22 3189

## 2022-08-16 NOTE — H&P
[facility] Medicine Services      Patient Name: Gali Dover  : 1945  MRN: [id number]  Primary Care Physician:  Chris Pedro MD  Date of admission: 8/15/2022      Subjective      Chief Complaint: Shortness of breath with increased oxygen demand.    History of Present Illness: Gali Dover is a 77 y.o. female who presented to [facility] on 8/15/2022 complaining of cough and shortness of breath which has been worsening since 2022.  The patient has seen her PCP and had 2 rounds of p.o. antibiotics and steroids.  She reports she will have some improvement and then has relapse with subjective fever, chills, cough, and increased shortness of breath.  The patient denies lower extremity edema.  The patient has been on oxygen at 2 L per nasal cannula times several years but has not needed oxygen during the day until recently.  She has had to wear her oxygen all the time because she gets extremely short of breath with any exertion.  Patient has had multiple test for COVID-19 and respiratory viral panel which have all been negative.      Review of Systems   Constitutional: Positive for chills, fever and malaise/fatigue.   Cardiovascular: Positive for dyspnea on exertion. Negative for chest pain, leg swelling and orthopnea.   Respiratory: Positive for cough, shortness of breath, sleep disturbances due to breathing, sputum production and wheezing.    Gastrointestinal: Negative for abdominal pain.   Genitourinary: Positive for dysuria.   All other systems reviewed and are negative.      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    • Neuropathy    •  Stricture of artery (CMS/Formerly [facility]) 2021       Past Surgical History:   Procedure Laterality Date   • CAROTID SUBCLAVIAN BYPASS Left 2021    Procedure: CAROTID SUBCLAVIAN BYPASS;  Surgeon: Navid Hassan MD;  Location: [facility] MAIN OR;  Service: Vascular;  Laterality: Left;   •  SECTION     • EYE SURGERY      cat ext   • HARDWARE REMOVAL Right     knee   • HYSTERECTOMY      still has ovaries   • KIDNEY SURGERY      stent placed    • KNEE ARTHROSCOPY Right    • LAPAROSCOPIC CHOLECYSTECTOMY         Family History: family history is not on file. Otherwise pertinent FHx was reviewed and not pertinent to current issue.    Social History:  reports that she has quit smoking. She does not have any smokeless tobacco history on file. She reports current alcohol use. She reports previous drug use.    Home Medications:  Prior to Admission Medications     Prescriptions Last Dose Informant Patient Reported? Taking?    albuterol sulfate  (90 Base) MCG/ACT inhaler   Yes Yes    Inhale 2 puffs Every 4 (Four) Hours As Needed for Wheezing.    aspirin 81 MG EC tablet   Yes Yes    Take 81 mg by mouth Daily.    azelastine (ASTELIN) 0.1 % nasal spray   Yes Yes    1 spray into the nostril(s) as directed by provider 2 (Two) Times a Day. Use in each nostril as directed    budesonide-formoterol (SYMBICORT) 160-4.5 MCG/ACT inhaler   Yes Yes    Inhale 2 puffs 2 (Two) Times a Day.    Cholecalciferol (Vitamin D) 50 MCG (2000 UT) capsule   Yes Yes    Take 2,000 Units by mouth Daily. A    cycloSPORINE (RESTASIS) 0.05 % ophthalmic emulsion   Yes Yes    Administer 1 drop to both eyes 2 (Two) Times a Day.    digoxin (LANOXIN) 250 MCG tablet   Yes Yes    Take 250 mcg by mouth every night at bedtime.    escitalopram (LEXAPRO) 10 MG tablet   Yes Yes    Take 10 mg by mouth Every Morning.    ezetimibe (ZETIA) 10 MG tablet   Yes Yes    Take 10 mg by mouth every night at bedtime.    fluticasone (FLONASE) 50 MCG/ACT nasal spray   Yes Yes     2 sprays into the nostril(s) as directed by provider Every Morning.    furosemide (LASIX) 20 MG tablet   Yes Yes    Take 10 mg by mouth Every Morning.    gabapentin (NEURONTIN) 300 MG capsule   Yes Yes    Take 600 mg by mouth 3 (Three) Times a Day.    immune globulin, human, (GAMMAGARD) 1 GM/10ML solution infusion   Yes Yes    Infuse  into a venous catheter See Admin Instructions. Patient receives monthly infusion on the first Thursday of every month. Last IVIG infusion per patient was 08/04/22.    levothyroxine (SYNTHROID, LEVOTHROID) 25 MCG tablet   Yes Yes    Take 25 mcg by mouth Every Morning.    losartan (COZAAR) 25 MG tablet   Yes Yes    Take 25 mg by mouth Every Morning.    metoprolol succinate XL (TOPROL-XL) 50 MG 24 hr tablet   Yes Yes    Take 50 mg by mouth Every Morning.    montelukast (SINGULAIR) 10 MG tablet   Yes Yes    Take 10 mg by mouth Every Night.    ondansetron (Zofran) 4 MG tablet   No Yes    Take 1 tablet by mouth Every 6 (Six) Hours As Needed for Nausea or Vomiting.    pantoprazole (PROTONIX) 40 MG EC tablet   Yes Yes    Take 80 mg by mouth Daily.    potassium chloride 10 MEQ CR tablet   Yes Yes    Take 10 mEq by mouth Daily.    Probiotic Product (PROBIOTIC PO)   Yes Yes    Take 1 capsule by mouth Daily.    simvastatin (ZOCOR) 40 MG tablet   Yes Yes    Take 40 mg by mouth Every Night.    spironolactone (ALDACTONE) 25 MG tablet   Yes Yes    Take 25 mg by mouth Daily.    tiotropium (SPIRIVA) 18 MCG per inhalation capsule   Yes Yes    Place 1 capsule into inhaler and inhale Every Morning. Use and bring dos    Tocilizumab (Actemra) 162 MG/0.9ML solution prefilled syringe   Yes Yes    Inject 1 syringe under the skin into the appropriate area as directed Every 21 (Twenty-One) Days.    hydrALAZINE (APRESOLINE) 100 MG tablet   Yes No    Take 100 mg by mouth 3 (Three) Times a Day. Take dos    NIFEdipine XL (PROCARDIA XL) 30 MG 24 hr tablet   Yes No    Take 60 mg by mouth 2 (two) times a day.             Allergies:  Allergies   Allergen Reactions   • Crestor [Rosuvastatin] Myalgia   • Lipitor [Atorvastatin] Myalgia   • Penicillins Hives   • Hctz [Hydrochlorothiazide] Rash       Objective      Vitals:   Temp:  [98.6 °F (37 °C)-100.4 °F (38 °C)] 100.4 °F (38 °C)  Heart Rate:  [] 96  Resp:  [17-18] 18  BP: ()/(41-75) 135/70  Flow (L/min):  [2] 2    Physical Exam  Vitals and nursing note reviewed.   Constitutional:       General: She is not in acute distress.     Appearance: Normal appearance. She is ill-appearing. She is not toxic-appearing or diaphoretic.   HENT:      Head: Normocephalic.      Right Ear: External ear normal.      Left Ear: External ear normal.      Nose: Nose normal.      Mouth/Throat:      Mouth: Mucous membranes are moist.   Eyes:      General: No scleral icterus.        Right eye: No discharge.         Left eye: No discharge.      Extraocular Movements: Extraocular movements intact.      Conjunctiva/sclera: Conjunctivae normal.      Pupils: Pupils are equal, round, and reactive to light.   Cardiovascular:      Rate and Rhythm: Normal rate. Rhythm irregular. Frequent extrasystoles are present.     Pulses: Normal pulses.      Heart sounds: Murmur heard.    Systolic murmur is present with a grade of 4/6.    No friction rub.   Pulmonary:      Effort: Pulmonary effort is normal.      Breath sounds: Normal breath sounds.   Abdominal:      General: Bowel sounds are normal.      Palpations: Abdomen is soft.   Musculoskeletal:         General: Normal range of motion.      Cervical back: Normal range of motion and neck supple.      Right lower leg: No edema.      Left lower leg: No edema.   Skin:     General: Skin is warm and dry.   Neurological:      Mental Status: She is alert.   Psychiatric:         Attention and Perception: Attention and perception normal. She is attentive.         Mood and Affect: Mood is anxious.         Speech: Speech normal.         Behavior: Behavior normal.          Thought Content: Thought content normal.         Cognition and Memory: Cognition and memory normal.         Judgment: Judgment is not impulsive or inappropriate.         Result Review    Result Review:  I have personally reviewed the results from the time of this admission to 8/16/2022 17:38 EDT and agree with these findings:  [x]  Laboratory  [x]  Microbiology  [x]  Radiology  [x]  EKG/Telemetry   [x]  Cardiology/Vascular   []  Pathology  [x]  Old records  []  Other:  Most notable findings include: Atypical pneumonia    Assessment & Plan        Active Hospital Problems:  Active Hospital Problems    Diagnosis    • **Pneumonia of left lower lobe due to infectious organism    • Anxiety    • COPD exacerbation (HCC)    • Polymyalgia     • Paroxysmal atrial fibrillation (HCC)    • GERD (gastroesophageal reflux disease)      Plan:     Shortness of breath, uncertain etiology--consideration for community-acquired pneumonia; consideration for cardiac cause: Serial troponin; echocardiogram; add guaifenesin with codeine  -Grade 4/6 systolic murmur  -Review of echocardiogram from  7/19/2021 shows EF 70%    Community-acquired pneumonia, bibasilar with outpatient treatment failure: IV Rocephin; IV azithromycin  -Patient reports treatment with 2 antibiotic courses and steroids since April 2022    COPD, chronic: continue Albuterol; continue Astelin;  continue Symbicort; continue Singulair ; hold Flonase    Atrial fibrillation, chronic, without anticoagulation therapy due to GI bleed in past: continue digoxin; check digoxin level; continue aspirin; continue metoprolol     Hyponatremia, mild, sodium 132 with CO2 21.4: repeat BMP in a.m .     HLD, choronic: hold Zetia as it is no formulary   Lasix gabapentin    Hypothyroidism, chorionic: continue  levothyroxine     HTN, chronic: continue losartan; continue metoprolol     GERD, chronic: continue Protonix    Low serum IgG: patient receives  immunoglobulin      DVT  prophylaxis:  Medical DVT prophylaxis orders are present.    CODE STATUS:    Level Of Support Discussed With: Patient  Code Status (Patient has no pulse and is not breathing): CPR (Attempt to Resuscitate)  Medical Interventions (Patient has pulse or is breathing): Full Support    Admission Status:  I believe this patient meets inpatient status.    I discussed the patient's findings and my recommendations with patient and nursing staff.    This patient has been examined wearing appropriate Personal Protective Equipment. 08/16/22      Signature: Electronically signed by RICKY Mercedes, 08/17/22, 12:47 AM EDT.  Patient seen and examined agree with above, is a 77-year-old white female with multiple medical problems, a past history significant for atrial fibrillation, GERD, hypothyroidism, COPD, asthma, depression, hypertension, peripheral vascular disease.  Presented to the ER with complaints of cough, shortness of breath, which has been worsening since April 22.  The patient seen her PCP and had 2 rounds of p.o. antibiotics and steroids.  She reports she will have some improvement then she relapses, with fever chills and cough increase shortness of breath, patient denies any lower extremity edema.  She has been on oxygen 2 L nasal cannula, on exam 77-year-old chronically ill looking female in bed she is short of breath, neck is supple, lungsShe has decreased breath sounds bilaterally some rhonchi on the left, heart regular rhythm normal S1-S2 extrasystoles. murmur heard systolic 4/6, the abdomen is soft nontender non distended, extremities no edema, skin is warm, pale and dry.  Neurological exam no focal deficit, generalized weakness, psych wise she has anxiety, she is awake alert oriented x3.  Assessment and plan  1.  Recurrent dyspnea history of COPD.  Consideration for community-acquired pneumonia or rule out cardiac causes.  Serial troponin echocardiogram IV antibiotics Rocephin and Zithromax Mucinex.  DuoNeb  albuterol Brovana budesonide  2. Atrial fibrillation chronic without anticoagulation due to GI bleed in the past continue dig continue aspirin and metoprolol.  Electronically signed by Mitesh Benavdies MD, 08/17/22, 10:01 AM EDT.

## 2022-08-16 NOTE — ED NOTES
Transfer center called to speak with hospitalist at [facility]. Will await return call.

## 2022-08-16 NOTE — PLAN OF CARE
Problem: Fluid Imbalance (Pneumonia)  Goal: Fluid Balance  8/16/2022 1814 by Elisha Martin RN  Outcome: Ongoing, Progressing  8/16/2022 1814 by Elisha Martin RN  Outcome: Ongoing, Progressing     Problem: Infection (Pneumonia)  Goal: Resolution of Infection Signs and Symptoms  8/16/2022 1814 by Elisha Martin RN  Outcome: Ongoing, Progressing  8/16/2022 1814 by Elisha Martin RN  Outcome: Ongoing, Progressing     Problem: Respiratory Compromise (Pneumonia)  Goal: Effective Oxygenation and Ventilation  8/16/2022 1814 by Elisha Martin RN  Outcome: Ongoing, Progressing  8/16/2022 1814 by Elisha Martin RN  Outcome: Ongoing, Progressing  Intervention: Promote Airway Secretion Clearance  Recent Flowsheet Documentation  Taken 8/16/2022 1459 by Elisha Martin RN  Cough And Deep Breathing: done independently per patient     Problem: Adult Inpatient Plan of Care  Goal: Plan of Care Review  Outcome: Ongoing, Progressing  Goal: Patient-Specific Goal (Individualized)  Outcome: Ongoing, Progressing  Goal: Absence of Hospital-Acquired Illness or Injury  Outcome: Ongoing, Progressing  Intervention: Identify and Manage Fall Risk  Recent Flowsheet Documentation  Taken 8/16/2022 1605 by Elisha Martin RN  Safety Promotion/Fall Prevention: safety round/check completed  Taken 8/16/2022 1459 by Elisha aMrtin RN  Safety Promotion/Fall Prevention: safety round/check completed  Intervention: Prevent and Manage VTE (Venous Thromboembolism) Risk  Recent Flowsheet Documentation  Taken 8/16/2022 1459 by Elisha Martin RN  VTE Prevention/Management:   compression stockings off   sequential compression devices off  Range of Motion: active ROM (range of motion) encouraged  Intervention: Prevent Infection  Recent Flowsheet Documentation  Taken 8/16/2022 1459 by Elisha Martin RN  Infection Prevention:   hand hygiene promoted   equipment surfaces disinfected  Goal: Optimal Comfort and  Wellbeing  Outcome: Ongoing, Progressing  Intervention: Monitor Pain and Promote Comfort  Recent Flowsheet Documentation  Taken 8/16/2022 1459 by Elisha Martin, RN  Pain Management Interventions:   quiet environment facilitated   position adjusted  Intervention: Provide Person-Centered Care  Recent Flowsheet Documentation  Taken 8/16/2022 1459 by Elisha Martin, RN  Trust Relationship/Rapport:   care explained   choices provided   questions answered   questions encouraged   thoughts/feelings acknowledged  Goal: Readiness for Transition of Care  Outcome: Ongoing, Progressing  Intervention: Mutually Develop Transition Plan  Recent Flowsheet Documentation  Taken 8/16/2022 1504 by Elisha Martin, RN  Transportation Anticipated: family or friend will provide  Patient/Family Anticipated Services at Transition:   Patient/Family Anticipates Transition to: home  Taken 8/16/2022 1500 by Elisha Martin, RN  Equipment Currently Used at Home: walker, rolling   Goal Outcome Evaluation:                 Patient placed on a tele. IVF started. No c/o pain or discomfort this shift. Will continue to observe.

## 2022-08-16 NOTE — ED NOTES
Report given to [facility]. EMS transporting pt to [facility]. Pt a&ox4, VSS, abc's intact, NAD noted, no questions or needs at time of transfer.

## 2022-08-17 ENCOUNTER — APPOINTMENT (OUTPATIENT)
Dept: CARDIOLOGY | Facility: HOSPITAL | Age: 77
End: 2022-08-17

## 2022-08-17 LAB
ALBUMIN SERPL-MCNC: 2.2 G/DL (ref 3.5–5.2)
ALBUMIN/GLOB SERPL: 0.7 G/DL
ALP SERPL-CCNC: 31 U/L (ref 39–117)
ALT SERPL W P-5'-P-CCNC: 13 U/L (ref 1–33)
ANION GAP SERPL CALCULATED.3IONS-SCNC: 14 MMOL/L (ref 5–15)
ANISOCYTOSIS BLD QL: ABNORMAL
AST SERPL-CCNC: 23 U/L (ref 1–32)
BH CV ECHO MEAS - ACS: 2.15 CM
BH CV ECHO MEAS - AO MAX PG: 10.1 MMHG
BH CV ECHO MEAS - AO MEAN PG: 6.4 MMHG
BH CV ECHO MEAS - AO ROOT DIAM: 2.9 CM
BH CV ECHO MEAS - AO V2 MAX: 158.6 CM/SEC
BH CV ECHO MEAS - AO V2 VTI: 44.1 CM
BH CV ECHO MEAS - AVA(I,D): 2.8 CM2
BH CV ECHO MEAS - EDV(CUBED): 29.7 ML
BH CV ECHO MEAS - EDV(MOD-SP4): 36.1 ML
BH CV ECHO MEAS - EF(MOD-BP): 62 %
BH CV ECHO MEAS - EF(MOD-SP4): 62.4 %
BH CV ECHO MEAS - ESV(CUBED): 9.6 ML
BH CV ECHO MEAS - ESV(MOD-SP4): 13.6 ML
BH CV ECHO MEAS - FS: 31.5 %
BH CV ECHO MEAS - IVS/LVPW: 1.11 CM
BH CV ECHO MEAS - IVSD: 1.61 CM
BH CV ECHO MEAS - LA DIMENSION: 4.1 CM
BH CV ECHO MEAS - LV DIASTOLIC VOL/BSA (35-75): 21.2 CM2
BH CV ECHO MEAS - LV MASS(C)D: 170.9 GRAMS
BH CV ECHO MEAS - LV MAX PG: 7.4 MMHG
BH CV ECHO MEAS - LV MEAN PG: 4.4 MMHG
BH CV ECHO MEAS - LV SYSTOLIC VOL/BSA (12-30): 8 CM2
BH CV ECHO MEAS - LV V1 MAX: 136.1 CM/SEC
BH CV ECHO MEAS - LV V1 VTI: 39.8 CM
BH CV ECHO MEAS - LVIDD: 3.1 CM
BH CV ECHO MEAS - LVIDS: 2.12 CM
BH CV ECHO MEAS - LVOT AREA: 3.1 CM2
BH CV ECHO MEAS - LVOT DIAM: 1.98 CM
BH CV ECHO MEAS - LVPWD: 1.45 CM
BH CV ECHO MEAS - MV A MAX VEL: 140.7 CM/SEC
BH CV ECHO MEAS - MV DEC SLOPE: 749.4 CM/SEC2
BH CV ECHO MEAS - MV DEC TIME: 0.19 MSEC
BH CV ECHO MEAS - MV E MAX VEL: 141.9 CM/SEC
BH CV ECHO MEAS - MV E/A: 1.01
BH CV ECHO MEAS - MV MAX PG: 11.7 MMHG
BH CV ECHO MEAS - MV MEAN PG: 5.2 MMHG
BH CV ECHO MEAS - MV V2 VTI: 47.3 CM
BH CV ECHO MEAS - MVA(VTI): 2.6 CM2
BH CV ECHO MEAS - PA ACC TIME: 0.14 SEC
BH CV ECHO MEAS - PA PR(ACCEL): 14.6 MMHG
BH CV ECHO MEAS - PA V2 MAX: 127.5 CM/SEC
BH CV ECHO MEAS - QP/QS: 0.97
BH CV ECHO MEAS - RAP SYSTOLE: 3 MMHG
BH CV ECHO MEAS - RV MAX PG: 5.6 MMHG
BH CV ECHO MEAS - RV V1 MAX: 118 CM/SEC
BH CV ECHO MEAS - RV V1 VTI: 28.7 CM
BH CV ECHO MEAS - RVDD: 3.2 CM
BH CV ECHO MEAS - RVOT DIAM: 2.3 CM
BH CV ECHO MEAS - RVSP: 32.1 MMHG
BH CV ECHO MEAS - SI(MOD-SP4): 13.2 ML/M2
BH CV ECHO MEAS - SV(LVOT): 122.5 ML
BH CV ECHO MEAS - SV(MOD-SP4): 22.5 ML
BH CV ECHO MEAS - SV(RVOT): 119.1 ML
BH CV ECHO MEAS - TR MAX PG: 29.1 MMHG
BH CV ECHO MEAS - TR MAX VEL: 269.4 CM/SEC
BILIRUB SERPL-MCNC: <0.2 MG/DL (ref 0–1.2)
BUN SERPL-MCNC: 7 MG/DL (ref 8–23)
BUN/CREAT SERPL: 21.2 (ref 7–25)
CA-I SERPL ISE-MCNC: 1.15 MMOL/L (ref 1.2–1.3)
CALCIUM SPEC-SCNC: 7.9 MG/DL (ref 8.6–10.5)
CHLORIDE SERPL-SCNC: 98 MMOL/L (ref 98–107)
CHOLEST SERPL-MCNC: 101 MG/DL (ref 0–200)
CK SERPL-CCNC: 36 U/L (ref 20–180)
CO2 SERPL-SCNC: 22 MMOL/L (ref 22–29)
CREAT SERPL-MCNC: 0.33 MG/DL (ref 0.57–1)
D DIMER PPP FEU-MCNC: 4.52 MG/L (FEU) (ref 0–0.59)
D-LACTATE SERPL-SCNC: 0.7 MMOL/L (ref 0.5–2)
DEPRECATED RDW RBC AUTO: 46.4 FL (ref 37–54)
DIGOXIN SERPL-MCNC: 0.7 NG/ML (ref 0.6–1.2)
EGFRCR SERPLBLD CKD-EPI 2021: 106.3 ML/MIN/1.73
ERYTHROCYTE [DISTWIDTH] IN BLOOD BY AUTOMATED COUNT: 15.2 % (ref 12.3–15.4)
FLUAV SUBTYP SPEC NAA+PROBE: NOT DETECTED
FLUBV RNA ISLT QL NAA+PROBE: NOT DETECTED
GLOBULIN UR ELPH-MCNC: 3.3 GM/DL
GLUCOSE SERPL-MCNC: 92 MG/DL (ref 65–99)
HBA1C MFR BLD: 5.2 % (ref 3.5–5.6)
HCT VFR BLD AUTO: 25.5 % (ref 34–46.6)
HDLC SERPL-MCNC: 28 MG/DL (ref 40–60)
HGB BLD-MCNC: 8.6 G/DL (ref 12–15.9)
L PNEUMO1 AG UR QL IA: NEGATIVE
LDLC SERPL CALC-MCNC: 58 MG/DL (ref 0–100)
LDLC/HDLC SERPL: 2.11 {RATIO}
LYMPHOCYTES # BLD MANUAL: 1.19 10*3/MM3 (ref 0.7–3.1)
LYMPHOCYTES NFR BLD MANUAL: 8 % (ref 5–12)
MAGNESIUM SERPL-MCNC: 1.6 MG/DL (ref 1.6–2.4)
MAXIMAL PREDICTED HEART RATE: 142 BPM
MCH RBC QN AUTO: 29.1 PG (ref 26.6–33)
MCHC RBC AUTO-ENTMCNC: 33.6 G/DL (ref 31.5–35.7)
MCV RBC AUTO: 86.9 FL (ref 79–97)
MONOCYTES # BLD: 1.36 10*3/MM3 (ref 0.1–0.9)
NEUTROPHILS # BLD AUTO: 14.45 10*3/MM3 (ref 1.7–7)
NEUTROPHILS NFR BLD MANUAL: 84 % (ref 42.7–76)
NEUTS BAND NFR BLD MANUAL: 1 % (ref 0–5)
NT-PROBNP SERPL-MCNC: 1680 PG/ML (ref 0–1800)
PLATELET # BLD AUTO: 671 10*3/MM3 (ref 140–450)
PMV BLD AUTO: 8.4 FL (ref 6–12)
POTASSIUM SERPL-SCNC: 3 MMOL/L (ref 3.5–5.2)
PROCALCITONIN SERPL-MCNC: 0.11 NG/ML (ref 0–0.25)
PROT SERPL-MCNC: 5.5 G/DL (ref 6–8.5)
RBC # BLD AUTO: 2.94 10*6/MM3 (ref 3.77–5.28)
SCAN SLIDE: NORMAL
SMALL PLATELETS BLD QL SMEAR: ABNORMAL
SODIUM SERPL-SCNC: 134 MMOL/L (ref 136–145)
STRESS TARGET HR: 121 BPM
TOXIC GRANULATION: ABNORMAL
TRIGL SERPL-MCNC: 70 MG/DL (ref 0–150)
TSH SERPL DL<=0.05 MIU/L-ACNC: 2.79 UIU/ML (ref 0.27–4.2)
VARIANT LYMPHS NFR BLD MANUAL: 7 % (ref 19.6–45.3)
VLDLC SERPL-MCNC: 15 MG/DL (ref 5–40)
WBC NRBC COR # BLD: 17 10*3/MM3 (ref 3.4–10.8)

## 2022-08-17 PROCEDURE — 25010000002 CEFTRIAXONE PER 250 MG: Performed by: NURSE PRACTITIONER

## 2022-08-17 PROCEDURE — 94799 UNLISTED PULMONARY SVC/PX: CPT

## 2022-08-17 PROCEDURE — 25010000002 METHYLPREDNISOLONE PER 40 MG: Performed by: INTERNAL MEDICINE

## 2022-08-17 PROCEDURE — 82550 ASSAY OF CK (CPK): CPT | Performed by: INTERNAL MEDICINE

## 2022-08-17 PROCEDURE — 99233 SBSQ HOSP IP/OBS HIGH 50: CPT | Performed by: INTERNAL MEDICINE

## 2022-08-17 PROCEDURE — 82330 ASSAY OF CALCIUM: CPT | Performed by: INTERNAL MEDICINE

## 2022-08-17 PROCEDURE — 85379 FIBRIN DEGRADATION QUANT: CPT | Performed by: INTERNAL MEDICINE

## 2022-08-17 PROCEDURE — 84132 ASSAY OF SERUM POTASSIUM: CPT | Performed by: INTERNAL MEDICINE

## 2022-08-17 PROCEDURE — 93306 TTE W/DOPPLER COMPLETE: CPT

## 2022-08-17 PROCEDURE — 83605 ASSAY OF LACTIC ACID: CPT | Performed by: INTERNAL MEDICINE

## 2022-08-17 PROCEDURE — 87449 NOS EACH ORGANISM AG IA: CPT | Performed by: INTERNAL MEDICINE

## 2022-08-17 PROCEDURE — 84145 PROCALCITONIN (PCT): CPT | Performed by: INTERNAL MEDICINE

## 2022-08-17 PROCEDURE — 80053 COMPREHEN METABOLIC PANEL: CPT | Performed by: INTERNAL MEDICINE

## 2022-08-17 PROCEDURE — 97166 OT EVAL MOD COMPLEX 45 MIN: CPT

## 2022-08-17 PROCEDURE — 93306 TTE W/DOPPLER COMPLETE: CPT | Performed by: INTERNAL MEDICINE

## 2022-08-17 PROCEDURE — 87502 INFLUENZA DNA AMP PROBE: CPT | Performed by: INTERNAL MEDICINE

## 2022-08-17 PROCEDURE — 80061 LIPID PANEL: CPT | Performed by: INTERNAL MEDICINE

## 2022-08-17 PROCEDURE — 83735 ASSAY OF MAGNESIUM: CPT | Performed by: INTERNAL MEDICINE

## 2022-08-17 PROCEDURE — 83036 HEMOGLOBIN GLYCOSYLATED A1C: CPT | Performed by: INTERNAL MEDICINE

## 2022-08-17 PROCEDURE — 25010000002 ENOXAPARIN PER 10 MG: Performed by: INTERNAL MEDICINE

## 2022-08-17 PROCEDURE — 97162 PT EVAL MOD COMPLEX 30 MIN: CPT

## 2022-08-17 PROCEDURE — 94664 DEMO&/EVAL PT USE INHALER: CPT

## 2022-08-17 PROCEDURE — 25010000002 AZITHROMYCIN PER 500 MG: Performed by: NURSE PRACTITIONER

## 2022-08-17 PROCEDURE — 85025 COMPLETE CBC W/AUTO DIFF WBC: CPT | Performed by: INTERNAL MEDICINE

## 2022-08-17 PROCEDURE — 83880 ASSAY OF NATRIURETIC PEPTIDE: CPT | Performed by: INTERNAL MEDICINE

## 2022-08-17 PROCEDURE — 85007 BL SMEAR W/DIFF WBC COUNT: CPT | Performed by: INTERNAL MEDICINE

## 2022-08-17 PROCEDURE — 80162 ASSAY OF DIGOXIN TOTAL: CPT | Performed by: NURSE PRACTITIONER

## 2022-08-17 PROCEDURE — 84443 ASSAY THYROID STIM HORMONE: CPT | Performed by: INTERNAL MEDICINE

## 2022-08-17 RX ORDER — IPRATROPIUM BROMIDE AND ALBUTEROL SULFATE 2.5; .5 MG/3ML; MG/3ML
3 SOLUTION RESPIRATORY (INHALATION)
Status: DISCONTINUED | OUTPATIENT
Start: 2022-08-17 | End: 2022-08-17 | Stop reason: SDUPTHER

## 2022-08-17 RX ORDER — METHYLPREDNISOLONE SODIUM SUCCINATE 40 MG/ML
40 INJECTION, POWDER, LYOPHILIZED, FOR SOLUTION INTRAMUSCULAR; INTRAVENOUS EVERY 8 HOURS
Status: DISCONTINUED | OUTPATIENT
Start: 2022-08-17 | End: 2022-08-18

## 2022-08-17 RX ORDER — BUDESONIDE 0.5 MG/2ML
0.5 INHALANT ORAL
Status: DISCONTINUED | OUTPATIENT
Start: 2022-08-17 | End: 2022-08-18 | Stop reason: HOSPADM

## 2022-08-17 RX ORDER — IPRATROPIUM BROMIDE AND ALBUTEROL SULFATE 2.5; .5 MG/3ML; MG/3ML
3 SOLUTION RESPIRATORY (INHALATION) EVERY 4 HOURS PRN
Status: DISCONTINUED | OUTPATIENT
Start: 2022-08-17 | End: 2022-08-18 | Stop reason: HOSPADM

## 2022-08-17 RX ORDER — METHYLPREDNISOLONE SODIUM SUCCINATE 125 MG/2ML
80 INJECTION, POWDER, LYOPHILIZED, FOR SOLUTION INTRAMUSCULAR; INTRAVENOUS EVERY 6 HOURS
Status: DISCONTINUED | OUTPATIENT
Start: 2022-08-17 | End: 2022-08-17

## 2022-08-17 RX ORDER — GUAIFENESIN/DEXTROMETHORPHAN 100-10MG/5
10 SYRUP ORAL EVERY 6 HOURS PRN
Status: DISCONTINUED | OUTPATIENT
Start: 2022-08-17 | End: 2022-08-18 | Stop reason: HOSPADM

## 2022-08-17 RX ORDER — POTASSIUM CHLORIDE 1.5 G/1.77G
40 POWDER, FOR SOLUTION ORAL AS NEEDED
Status: DISCONTINUED | OUTPATIENT
Start: 2022-08-17 | End: 2022-08-18 | Stop reason: HOSPADM

## 2022-08-17 RX ORDER — METHYLPREDNISOLONE SODIUM SUCCINATE 125 MG/2ML
60 INJECTION, POWDER, LYOPHILIZED, FOR SOLUTION INTRAMUSCULAR; INTRAVENOUS EVERY 6 HOURS
Status: DISCONTINUED | OUTPATIENT
Start: 2022-08-18 | End: 2022-08-17

## 2022-08-17 RX ORDER — GUAIFENESIN 600 MG/1
600 TABLET, EXTENDED RELEASE ORAL 2 TIMES DAILY
Status: DISCONTINUED | OUTPATIENT
Start: 2022-08-17 | End: 2022-08-18 | Stop reason: HOSPADM

## 2022-08-17 RX ORDER — POTASSIUM CHLORIDE 7.45 MG/ML
10 INJECTION INTRAVENOUS
Status: DISCONTINUED | OUTPATIENT
Start: 2022-08-17 | End: 2022-08-18 | Stop reason: HOSPADM

## 2022-08-17 RX ORDER — IPRATROPIUM BROMIDE AND ALBUTEROL SULFATE 2.5; .5 MG/3ML; MG/3ML
3 SOLUTION RESPIRATORY (INHALATION)
Status: DISCONTINUED | OUTPATIENT
Start: 2022-08-17 | End: 2022-08-17

## 2022-08-17 RX ORDER — POTASSIUM CHLORIDE 20 MEQ/1
40 TABLET, EXTENDED RELEASE ORAL AS NEEDED
Status: DISCONTINUED | OUTPATIENT
Start: 2022-08-17 | End: 2022-08-18 | Stop reason: HOSPADM

## 2022-08-17 RX ORDER — ARFORMOTEROL TARTRATE 15 UG/2ML
15 SOLUTION RESPIRATORY (INHALATION)
Status: DISCONTINUED | OUTPATIENT
Start: 2022-08-17 | End: 2022-08-18 | Stop reason: HOSPADM

## 2022-08-17 RX ORDER — METHYLPREDNISOLONE SODIUM SUCCINATE 40 MG/ML
40 INJECTION, POWDER, LYOPHILIZED, FOR SOLUTION INTRAMUSCULAR; INTRAVENOUS EVERY 6 HOURS
Status: DISCONTINUED | OUTPATIENT
Start: 2022-08-19 | End: 2022-08-17

## 2022-08-17 RX ADMIN — ESCITALOPRAM OXALATE 10 MG: 10 TABLET ORAL at 09:01

## 2022-08-17 RX ADMIN — ASPIRIN 81 MG: 81 TABLET, COATED ORAL at 09:01

## 2022-08-17 RX ADMIN — HYDROCODONE BITARTRATE AND ACETAMINOPHEN 1 TABLET: 5; 325 TABLET ORAL at 17:50

## 2022-08-17 RX ADMIN — SODIUM CHLORIDE 75 ML/HR: 9 INJECTION, SOLUTION INTRAVENOUS at 09:14

## 2022-08-17 RX ADMIN — CYCLOSPORINE 1 DROP: 0.5 EMULSION OPHTHALMIC at 09:01

## 2022-08-17 RX ADMIN — AZELASTINE HYDROCHLORIDE 1 SPRAY: 137 SPRAY, METERED NASAL at 09:05

## 2022-08-17 RX ADMIN — POTASSIUM CHLORIDE 10 MEQ: 750 TABLET, EXTENDED RELEASE ORAL at 09:00

## 2022-08-17 RX ADMIN — IPRATROPIUM BROMIDE 0.5 MG: 0.5 SOLUTION RESPIRATORY (INHALATION) at 07:50

## 2022-08-17 RX ADMIN — ATORVASTATIN CALCIUM 10 MG: 10 TABLET, FILM COATED ORAL at 20:38

## 2022-08-17 RX ADMIN — BUDESONIDE 0.5 MG: 0.5 INHALANT RESPIRATORY (INHALATION) at 22:17

## 2022-08-17 RX ADMIN — CEFTRIAXONE 1 G: 1 INJECTION, POWDER, FOR SOLUTION INTRAMUSCULAR; INTRAVENOUS at 00:01

## 2022-08-17 RX ADMIN — FUROSEMIDE 10 MG: 20 TABLET ORAL at 09:01

## 2022-08-17 RX ADMIN — METHYLPREDNISOLONE SODIUM SUCCINATE 40 MG: 40 INJECTION, POWDER, FOR SOLUTION INTRAMUSCULAR; INTRAVENOUS at 12:27

## 2022-08-17 RX ADMIN — METOPROLOL SUCCINATE 50 MG: 50 TABLET, EXTENDED RELEASE ORAL at 09:01

## 2022-08-17 RX ADMIN — LEVOTHYROXINE SODIUM 25 MCG: 0.03 TABLET ORAL at 05:58

## 2022-08-17 RX ADMIN — SPIRONOLACTONE 25 MG: 25 TABLET ORAL at 09:01

## 2022-08-17 RX ADMIN — GABAPENTIN 600 MG: 300 CAPSULE ORAL at 17:50

## 2022-08-17 RX ADMIN — Medication 2000 UNITS: at 09:00

## 2022-08-17 RX ADMIN — GUAIFENESIN AND CODEINE PHOSPHATE 5 ML: 10; 100 LIQUID ORAL at 17:50

## 2022-08-17 RX ADMIN — ENOXAPARIN SODIUM 40 MG: 100 INJECTION SUBCUTANEOUS at 17:50

## 2022-08-17 RX ADMIN — HYDRALAZINE HYDROCHLORIDE 100 MG: 25 TABLET, FILM COATED ORAL at 17:50

## 2022-08-17 RX ADMIN — AZITHROMYCIN MONOHYDRATE 500 MG: 500 INJECTION, POWDER, LYOPHILIZED, FOR SOLUTION INTRAVENOUS at 00:39

## 2022-08-17 RX ADMIN — Medication 1 CAPSULE: at 09:00

## 2022-08-17 RX ADMIN — NIFEDIPINE 60 MG: 30 TABLET, FILM COATED, EXTENDED RELEASE ORAL at 09:00

## 2022-08-17 RX ADMIN — LOSARTAN POTASSIUM 25 MG: 25 TABLET, FILM COATED ORAL at 09:01

## 2022-08-17 RX ADMIN — DIGOXIN 250 MCG: 250 TABLET ORAL at 12:27

## 2022-08-17 RX ADMIN — HYDROCODONE BITARTRATE AND ACETAMINOPHEN 1 TABLET: 5; 325 TABLET ORAL at 08:59

## 2022-08-17 RX ADMIN — GUAIFENESIN AND CODEINE PHOSPHATE 5 ML: 10; 100 LIQUID ORAL at 08:59

## 2022-08-17 RX ADMIN — Medication 10 ML: at 08:59

## 2022-08-17 RX ADMIN — ARFORMOTEROL TARTRATE 15 MCG: 15 SOLUTION RESPIRATORY (INHALATION) at 22:17

## 2022-08-17 RX ADMIN — GABAPENTIN 600 MG: 300 CAPSULE ORAL at 20:38

## 2022-08-17 RX ADMIN — HYDRALAZINE HYDROCHLORIDE 100 MG: 25 TABLET, FILM COATED ORAL at 20:38

## 2022-08-17 RX ADMIN — HYDRALAZINE HYDROCHLORIDE 100 MG: 25 TABLET, FILM COATED ORAL at 09:01

## 2022-08-17 RX ADMIN — GUAIFENESIN 600 MG: 600 TABLET, EXTENDED RELEASE ORAL at 12:27

## 2022-08-17 RX ADMIN — MONTELUKAST 10 MG: 10 TABLET, FILM COATED ORAL at 20:38

## 2022-08-17 RX ADMIN — GUAIFENESIN AND CODEINE PHOSPHATE 5 ML: 10; 100 LIQUID ORAL at 22:39

## 2022-08-17 RX ADMIN — METHYLPREDNISOLONE SODIUM SUCCINATE 40 MG: 40 INJECTION, POWDER, FOR SOLUTION INTRAMUSCULAR; INTRAVENOUS at 20:38

## 2022-08-17 RX ADMIN — CYCLOSPORINE 1 DROP: 0.5 EMULSION OPHTHALMIC at 20:38

## 2022-08-17 RX ADMIN — PANTOPRAZOLE SODIUM 80 MG: 40 TABLET, DELAYED RELEASE ORAL at 09:01

## 2022-08-17 RX ADMIN — GABAPENTIN 600 MG: 300 CAPSULE ORAL at 09:00

## 2022-08-17 RX ADMIN — Medication 10 ML: at 20:35

## 2022-08-17 NOTE — PROGRESS NOTES
[facility] Medicine Services Daily Progress Note    Patient Name: Gali Dover  : 1945  MRN: [id number]  Primary Care Physician:  Chris Pedro MD  Date of admission: 8/15/2022      Subjective      Chief Complaint: dyspnea    History of Present Illness: Gali Dover is a 77 y.o. female who presented to [facility] on 8/15/2022 complaining of cough and shortness of breath which has been worsening since 2022.  The patient has seen her PCP and had 2 rounds of p.o. antibiotics and steroids.  She reports she will have some improvement and then has relapse with subjective fever, chills, cough, and increased shortness of breath.  The patient denies lower extremity edema.  The patient has been on oxygen at 2 L per nasal cannula times several years but has not needed oxygen during the day until recently.  She has had to wear her oxygen all the time because she gets extremely short of breath with any exertion.  Patient has had multiple test for COVID-19 and respiratory viral panel which have all been negative.        22 patient seen and examined in bed no acute distress, complaining cough and dyspnea.  Presently on 2 L of oxygen.  Awaiting antibiotics.  Afebrile,    ROS   Constitutional: Positive for chills, fever and malaise/fatigue.   Cardiovascular: Positive for dyspnea on exertion. Negative for chest pain, leg swelling and orthopnea.   Respiratory: Positive for cough, shortness of breath, sleep disturbances due to breathing, sputum production and wheezing.    Gastrointestinal: Negative for abdominal pain.   Genitourinary: Positive for dysuria.   All other systems reviewed and are negative.      Objective      Vitals:   Temp:  [97.9 °F (36.6 °C)-100.4 °F (38 °C)] 99 °F (37.2 °C)  Heart Rate:  [] 89  Resp:  [16-18] 16  BP: (114-146)/(43-83) 146/83  Flow (L/min):  [2-2.5] 2    Physical Exam Vitals and nursing note reviewed.   Constitutional:       General: She is  not in acute distress.     Appearance: Normal appearance. She is ill-appearing. She is not toxic-appearing or diaphoretic.   HENT:      Head: Normocephalic.      Right Ear: External ear normal.      Left Ear: External ear normal.      Nose: Nose normal.      Mouth/Throat:      Mouth: Mucous membranes are moist.   Eyes:      General: No scleral icterus.        Right eye: No discharge.         Left eye: No discharge.      Extraocular Movements: Extraocular movements intact.      Conjunctiva/sclera: Conjunctivae normal.      Pupils: Pupils are equal, round, and reactive to light.   Cardiovascular:      Rate and Rhythm: Normal rate. Rhythm irregular. Frequent extrasystoles are present.     Pulses: Normal pulses.      Heart sounds: Murmur heard.    Systolic murmur is present with a grade of 4/6.    No friction rub.   Pulmonary:      Effort: Pulmonary effort is normal.      Breath sounds: Normal breath sounds.   Abdominal:      General: Bowel sounds are normal.      Palpations: Abdomen is soft.   Musculoskeletal:         General: Normal range of motion.      Cervical back: Normal range of motion and neck supple.      Right lower leg: No edema.      Left lower leg: No edema.   Skin:     General: Skin is warm and dry.   Neurological:      Mental Status: She is alert.   Psychiatric:         Attention and Perception: Attention and perception normal. She is attentive.         Mood and Affect: Mood is anxious.         Speech: Speech normal.         Behavior: Behavior normal.         Thought Content: Thought content normal.         Cognition and Memory: Cognition and memory normal.         Judgment: Judgment is not impulsive or inappropriate.         Result Review    Result Review:  I have personally reviewed the results from the time of this admission to 8/17/2022 10:14 EDT and agree with these findings:  []  Laboratory  []  Microbiology  []  Radiology  []  EKG/Telemetry   []  Cardiology/Vascular   []  Pathology  []  Old  records  []  Other:  Most notable findings include:   CMP:        Lab 08/17/22  0123 08/15/22  2238   SODIUM 134* 132*   POTASSIUM 3.0* 3.8   CHLORIDE 98 97*   CO2 22.0 21.4*   ANION GAP 14.0 13.6   BUN 7* 9   CREATININE 0.33* 0.62   EGFR 106.3 91.3   GLUCOSE 92 92   CALCIUM 7.9* 8.5*   IONIZED CALCIUM 1.15*  --    TOTAL PROTEIN 5.5*  --    ALBUMIN 2.20*  --    GLOBULIN 3.3  --    ALT (SGPT) 13  --    AST (SGOT) 23  --    BILIRUBIN <0.2  --    ALK PHOS 31*  --      CBC:      Lab 08/15/22  2218   WBC 15.82*   HEMOGLOBIN 10.1*   HEMATOCRIT 29.2*   PLATELETS 670*   NEUTROS ABS 13.55*   IMMATURE GRANS (ABS) 0.26*   LYMPHS ABS 0.95   MONOS ABS 0.93*   EOS ABS 0.08   MCV 86.4             Assessment & Plan      Brief Patient Summary:  Gali Dover is a 77 y.o. female who       arformoterol, 15 mcg, Nebulization, BID - RT  aspirin, 81 mg, Oral, Daily  atorvastatin, 10 mg, Oral, Daily  azelastine, 1 spray, Each Nare, Daily  azithromycin, 500 mg, Intravenous, Q24H  budesonide, 0.5 mg, Nebulization, BID - RT  cefTRIAXone, 1 g, Intravenous, Q24H  cholecalciferol, 2,000 Units, Oral, Daily  cycloSPORINE, 1 drop, Both Eyes, BID  digoxin, 250 mcg, Oral, Daily  enoxaparin, 40 mg, Subcutaneous, Daily  escitalopram, 10 mg, Oral, QAM  furosemide, 10 mg, Oral, QAM  gabapentin, 600 mg, Oral, TID  guaiFENesin, 600 mg, Oral, Q12H  hydrALAZINE, 100 mg, Oral, TID  ipratropium-albuterol, 3 mL, Nebulization, 4x Daily - RT  ipratropium-albuterol, 3 mL, Nebulization, Q6H - RT  lactobacillus acidophilus, 1 capsule, Oral, Daily  levothyroxine, 25 mcg, Oral, QAM  losartan, 25 mg, Oral, QAM  methylPREDNISolone sodium succinate, 80 mg, Intravenous, Q6H  metoprolol succinate XL, 50 mg, Oral, QAM  montelukast, 10 mg, Oral, Nightly  NIFEdipine XL, 60 mg, Oral, Q24H  pantoprazole, 80 mg, Oral, Daily  potassium chloride, 10 mEq, Oral, Daily  sodium chloride, 10 mL, Intravenous, Q12H  spironolactone, 25 mg, Oral, Daily             Active Hospital  Problems:  Active Hospital Problems    Diagnosis    • **Pneumonia of left lower lobe due to infectious organism    • COPD exacerbation (HCC)    • Polymyalgia     • Hypothyroid    • Hyperlipidemia    • Paroxysmal atrial fibrillation (HCC)    • Anxiety    • GERD (gastroesophageal reflux disease)      Shortness of breath, uncertain etiology--consideration for community-acquired pneumonia; consideration for cardiac cause:   -Serial troponin;   -echocardiogram;   -guaifenesin with codeine  -Grade 4/6 systolic murmur  -Review of echocardiogram from  7/19/2021 shows EF 70%     Community-acquired pneumonia, bibasilar with outpatient treatment failure:  - IV Rocephin; IV azithromycin  -Sputum culture blood cultures, Legionella pneumococcal antigen.  -Patient reports treatment with 2 antibiotic courses and steroids since April 2022     COPD, chronic:   -continue  DuoNeb;   -continue Astelin;    -Hold Symbicort; Brovana budesonide  continue Singulair ;   hold Flonase     Atrial fibrillation, chronic, without anticoagulation therapy due to GI bleed in past:   -continue digoxin; check digoxin level; continue aspirin; continue metoprolol      Hyponatremia, mild, sodium 132 with CO2 21.4: repeat BMP in a.m .      HLD, choronic: hold Zetia as it is no formulary   Lasix gabapentin     Hypothyroidism, chorionic: continue  levothyroxine      HTN, chronic: continue losartan; continue metoprolol      GERD, chronic: continue Protonix     Low serum IgG: patient receives  immunoglobulin      DVT prophylaxis:  Medical DVT prophylaxis orders are present.    CODE STATUS:    Level Of Support Discussed With: Patient  Code Status (Patient has no pulse and is not breathing): CPR (Attempt to Resuscitate)  Medical Interventions (Patient has pulse or is breathing): Full Support      Disposition:  I expect patient to be discharged home.    This patient has been examined wearing appropriate Personal Protective Equipment and discussed with rn.  08/17/22      Electronically signed by Mitesh Benavides MD, 08/17/22, 10:14 EDT.  [facility] Hospitalist Team

## 2022-08-17 NOTE — PLAN OF CARE
Goal Outcome Evaluation:  Plan of Care Reviewed With: patient           Outcome Evaluation: Pt is a 78 y/o female admitted with c/o cough and SOB worsening since April, DOE.  Chest X-ray: PNA.  Pt reports she lives alone in a 1 story home with no stairs to enter into home.  Pt has been ambulating with RW since April.  Pt currently on 2L O2, IV, and telemetry.  Pt completed bed mobility with min A, transfers with rW with cga/min A and ambulated 25' with RW with cga/min A.  Pt with slow joanie, unsteadiness and decreased endurance.  Pt required increased time and effort for all mobility tasks this date secondary to fatigue.  Pt with intermittent prolonged coughing during session.  Recommend SNF

## 2022-08-17 NOTE — PLAN OF CARE
Goal Outcome Evaluation:  Plan of Care Reviewed With: patient           Outcome Evaluation: Pt is a 78 y/o female admitted with c/o cough and SOB worsening since April, DOE.  Chest X-ray: PNA.  Pt reports she lives alone in a 1 story home with no stairs to enter into home.  Pt has been ambulating with RW since April. She has good social support of son, who lives in same neighborhood. Patient supine upon OT arrival. Min A for bed mobility and functional mobility within the room. Pt with SOB with minimal exertion tasks. Limited activity tolerance and global weakness noted. Pt is below stated baseline, and would benefit from short-term rehab to address deficits and ensure safe d/c to home environment.

## 2022-08-17 NOTE — THERAPY EVALUATION
Patient Name: Gali Dover  : 1945    MRN: [id number]                              Today's Date: 2022       Admit Date: 8/15/2022    Visit Dx:     ICD-10-CM ICD-9-CM   1. Pneumonia of left lower lobe due to infectious organism  J18.9 486   2. Weakness  R53.1 780.79     Patient Active Problem List   Diagnosis   • Bilateral carotid artery stenosis   • Anxiety   • Asthma   • Paroxysmal atrial fibrillation (HCC)   • Depression   • GERD (gastroesophageal reflux disease)   • Hyperlipidemia   • Essential hypertension   • Polymyalgia    • Giant cell arteritis (HCC)   • Hypothyroid   • Peripheral neuropathy   • Bilateral calf pain   • COPD (chronic obstructive pulmonary disease) with chronic bronchitis ([facility])   • Dizziness on standing   • Pain in right knee   • Primary osteoarthritis of right knee   • Overweight (BMI 25.0-29.9)   • Normocytic anemia due to blood loss   • COPD exacerbation ([facility])   • HCAP (healthcare-associated pneumonia)   • Acute respiratory failure with hypoxia ([facility])   • Pneumonia due to infectious organism   • Pneumonia of left lower lobe due to infectious organism     Past Medical History:   Diagnosis Date   • Anxiety    • Arteritis ([facility])    • Asthma    • Constipation     off and on   • COPD (chronic obstructive pulmonary disease) ([facility])    • Disease of thyroid gland    • Disorder of left eye    • Dry eyes    • GERD (gastroesophageal reflux disease)    • Hyperlipidemia    • Hypertension    • 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    • Neuropathy    • Stricture of artery ([facility]) 2021     Past Surgical History:   Procedure Laterality Date   • CAROTID SUBCLAVIAN BYPASS Left 2021    Procedure: CAROTID SUBCLAVIAN BYPASS;  Surgeon: Navid Hassan MD;  Location: [facility];  Service: Vascular;  Laterality: Left;   •  SECTION     • EYE SURGERY      cat ext   • HARDWARE REMOVAL Right     knee   • HYSTERECTOMY      still has ovaries   • KIDNEY SURGERY      stent placed    • KNEE ARTHROSCOPY Right     • LAPAROSCOPIC CHOLECYSTECTOMY        General Information     Row Name 08/17/22 1412          OT Time and Intention    Document Type evaluation  -ES     Mode of Treatment occupational therapy  -ES     Row Name 08/17/22 1412          General Information    Patient Profile Reviewed yes  -ES     Existing Precautions/Restrictions fall;oxygen therapy device and L/min  2L O2  -ES     Row Name 08/17/22 1412          Occupational Profile    Reason for Services/Referral (Occupational Profile) Pt is a 78 y/o female admitted with c/o cough and SOB worsening since April, [facility].  Chest X-ray: PNA.  Pt reports she lives alone in a 1 story home with no stairs to enter into home.  Pt has been ambulating with RW since April. She has good social support of son, who lives in same neighborhood.  -ES     Row Name 08/17/22 1412          Living Environment    People in Home alone  -ES     Row Name 08/17/22 1412          Home Main Entrance    Number of Stairs, Main Entrance none  -ES     Row Name 08/17/22 1412          Cognition    Orientation Status (Cognition) oriented x 4  -ES     Row Name 08/17/22 1412          Safety Issues, Functional Mobility    Impairments Affecting Function (Mobility) balance;endurance/activity tolerance;pain;shortness of breath;strength  -ES           User Key  (r) = Recorded By, (t) = Taken By, (c) = Cosigned By    Initials Name Provider Type    ES Deb Castellanos OT Occupational Therapist                 Mobility/ADL's     Row Name 08/17/22 1430          Bed Mobility    Bed Mobility bed mobility (all) activities  -ES     All Activities, [place] (Bed Mobility) minimum assist (75% patient effort)  -ES     Row Name 08/17/22 1430          Transfers    Sit-Stand [place] (Transfers) minimum assist (75% patient effort)  -ES     Row Name 08/17/22 1430          Sit-Stand Transfer    Assistive Device (Sit-Stand Transfers) walker, 4-wheeled  -ES     Row Name 08/17/22 1430          Functional Mobility     Functional Mobility- Ind. Level minimum assist (75% patient effort)  -ES     Row Name 08/17/22 1430          Activities of Daily Living    BADL Assessment/Intervention upper body dressing;lower body dressing  -ES     Row Name 08/17/22 1430          Upper Body Dressing Assessment/Training    [place] Level (Upper Body Dressing) set up  -[facility] Name 08/17/22 1430          Lower Body Dressing Assessment/Training    [place] Level (Lower Body Dressing) minimum assist (75% patient effort)  -ES           User Key  (r) = Recorded By, (t) = Taken By, (c) = Cosigned By    Initials Name Provider Type     Deb Castellanos OT Occupational Therapist               Obj/Interventions     [facility] Name 08/17/22 1430          Sensory Assessment (Somatosensory)    Sensory Assessment (Somatosensory) sensation intact  -[facility] Name 08/17/22 1430          Vision Assessment/Intervention    Visual Impairment/Limitations corrective lenses full-time  -[facility] Name 08/17/22 1430          Range of Motion Comprehensive    General Range of Motion no range of motion deficits identified  -ES     Row Name 08/17/22 1430          Strength Comprehensive (MMT)    Comment, General Manual Muscle Testing (MMT) Assessment BUE 4/5  -[facility] Name 08/17/22 1430          Balance    Balance Assessment sitting static balance;sitting dynamic balance;standing dynamic balance;standing static balance  -ES     Static Sitting Balance supervision  -ES     Dynamic Sitting Balance supervision  -ES     Static Standing Balance contact guard  -ES     Dynamic Standing Balance minimal assist  -ES     Balance Interventions sitting;standing;static;dynamic  -ES           User Key  (r) = Recorded By, (t) = Taken By, (c) = Cosigned By    Initials Name Provider Type     Deb Castellanos OT Occupational Therapist               Goals/Plan     [facility] Name 08/17/22 1446          Dressing Goal 1 (OT)    Activity/Device (Dressing Goal 1, OT) dressing skills, all   -ES     [place]/Cues Needed (Dressing Goal 1, OT) modified independence  -ES     Time Frame (Dressing Goal 1, OT) 2 weeks  -ES     Row Name 08/17/22 1446          Toileting Goal 1 (OT)    Activity/Device (Toileting Goal 1, OT) toileting skills, all  -ES     [place] Level/Cues Needed (Toileting Goal 1, OT) modified independence  -ES     Time Frame (Toileting Goal 1, OT) 2 weeks  -ES     Row Name 08/17/22 1446          Strength Goal 1 (OT)    Strength Goal 1 (OT) BUE 5/5  -ES     Time Frame (Strength Goal 1, OT) 2 weeks  -ES     Row Name 08/17/22 1446          Therapy Assessment/Plan (OT)    Planned Therapy Interventions (OT) activity tolerance training;BADL retraining;functional balance retraining;neuromuscular control/coordination retraining;ROM/therapeutic exercise;strengthening exercise;transfer/mobility retraining  -ES           User Key  (r) = Recorded By, (t) = Taken By, (c) = Cosigned By    Initials Name Provider Type    Deb Escalante OT Occupational Therapist               Clinical Impression     Row Name 08/17/22 1431          Pain Assessment    Pretreatment Pain Rating 6/10  -ES     Posttreatment Pain Rating 7/10  -ES     Pain Location - chest  -ES     Row Name 08/17/22 1431          Plan of Care Review    Plan of Care Reviewed With patient  -ES     Outcome Evaluation Pt is a 78 y/o female admitted with c/o cough and SOB worsening since April, DOE.  Chest X-ray: PNA.  Pt reports she lives alone in a 1 story home with no stairs to enter into home.  Pt has been ambulating with RW since April. She has good social support of son, who lives in same neighborhood. Patient supine upon OT arrival. Min A for bed mobility and functional mobility within the room. Pt with SOB with minimal exertion tasks. Limited activity tolerance and global weakness noted. Pt is below stated baseline, and would benefit from short-term rehab to address deficits and ensure safe d/c to home environment.  -ES     Row  Name 08/17/22 1431          Therapy Assessment/Plan (OT)    Therapy Frequency (OT) 3 times/wk  -ES     Row Name 08/17/22 1431          Therapy Plan Review/Discharge Plan (OT)    Anticipated Discharge Disposition (OT) skilled nursing facility  -ES     Row Name 08/17/22 1431          Vital Signs    Pre SpO2 (%) 92  -ES     O2 Delivery Pre Treatment nasal cannula  -ES     Intra SpO2 (%) 90  -ES     O2 Delivery Intra Treatment nasal cannula  -ES     Post SpO2 (%) 93  -ES     O2 Delivery Post Treatment nasal cannula  -ES     Pre Patient Position Supine  -ES     Intra Patient Position Standing  -ES     Post Patient Position Supine  -ES     Row Name 08/17/22 1431          Positioning and Restraints    Pre-Treatment Position in bed  -ES     Post Treatment Position bed  -ES     In Bed notified nsg;call light within reach;encouraged to call for assist;exit alarm on  -ES           User Key  (r) = Recorded By, (t) = Taken By, (c) = Cosigned By    Initials Name Provider Type    Deb Escalante OT Occupational Therapist               Outcome Measures     Row Name 08/17/22 1446          How much help from another is currently needed...    Putting on and taking off regular lower body clothing? 2  -ES     Bathing (including washing, rinsing, and drying) 2  -ES     Toileting (which includes using toilet bed pan or urinal) 3  -ES     Putting on and taking off regular upper body clothing 3  -ES     Taking care of personal grooming (such as brushing teeth) 4  -ES     Eating meals 4  -ES     AM-PAC 6 Clicks Score (OT) 18  -ES     Row Name 08/17/22 1342 08/17/22 0830       How much help from another person do you currently need...    Turning from your back to your side while in flat bed without using bedrails? 3  -AM 3  -BD    Moving from lying on back to sitting on the side of a flat bed without bedrails? 3  -AM 3  -BD    Moving to and from a bed to a chair (including a wheelchair)? 3  -AM 3  -BD    Standing up from a chair using  your arms (e.g., wheelchair, bedside chair)? 3  -AM 3  -BD    Climbing 3-5 steps with a railing? 2  -AM 3  -BD    To walk in hospital room? 3  -AM 3  -BD    AM-PAC 6 Clicks Score (PT) 17  -AM 18  -BD    Highest level of mobility 5 --> Static standing  -AM 6 --> Walked 10 steps or more  -BD    Row Name 08/17/22 1446          Functional Assessment    Outcome Measure Options AM-PAC 6 Clicks Daily Activity (OT)  -ES           User Key  (r) = Recorded By, (t) = Taken By, (c) = Cosigned By    Initials Name Provider Type    Deb Escalante OT Occupational Therapist    Salma Dial, RN Registered Nurse    Sammy Ovalle, PT Physical Therapist                  OT Recommendation and Plan  Planned Therapy Interventions (OT): activity tolerance training, BADL retraining, functional balance retraining, neuromuscular control/coordination retraining, ROM/therapeutic exercise, strengthening exercise, transfer/mobility retraining  Therapy Frequency (OT): 3 times/wk  Plan of Care Review  Plan of Care Reviewed With: patient  Outcome Evaluation: Pt is a 78 y/o female admitted with c/o cough and SOB worsening since April, DOE.  Chest X-ray: PNA.  Pt reports she lives alone in a 1 story home with no stairs to enter into home.  Pt has been ambulating with RW since April. She has good social support of son, who lives in same neighborhood. Patient supine upon OT arrival. Min A for bed mobility and functional mobility within the room. Pt with SOB with minimal exertion tasks. Limited activity tolerance and global weakness noted. Pt is below stated baseline, and would benefit from short-term rehab to address deficits and ensure safe d/c to home environment.     Time Calculation:    Time Calculation- OT     Row Name 08/17/22 1447             Time Calculation- OT    OT Start Time 1415  -ES      OT Stop Time 1436  -ES      OT Time Calculation (min) 21 min  -ES      Total Timed Code Minutes- OT 0 minute(s)  -ES      OT Received On  08/17/22  -TERELL      OT - Next Appointment 08/19/22  -TERELL      OT Goal Re-Cert Due Date 08/31/22  -ES            User Key  (r) = Recorded By, (t) = Taken By, (c) = Cosigned By    Initials Name Provider Type    Deb Escalante OT Occupational Therapist              Therapy Charges for Today     Code Description Service Date Service Provider Modifiers Qty    15228550933 HC OT EVAL MOD COMPLEXITY 3 8/17/2022 Deb Castellanos OT GO 1               Deb Castellanos OT  8/17/2022

## 2022-08-17 NOTE — PLAN OF CARE
Goal Outcome Evaluation:  Patient rested well throughout shift with PRN medication given for cough and pain. Still need a sputum on patient. VSS, continue to monitor.

## 2022-08-17 NOTE — CASE MANAGEMENT/SOCIAL WORK
Continued Stay Note  SANDRA Christensen     Patient Name: Gali Dover  MRN: [id number]  Today's Date: 8/17/2022    Admit Date: 8/15/2022     Discharge Plan     Row Name 08/17/22 1700       Plan    Plan Needs screened    Plan Comments Needs CM assessment, SNF placement?                    DAGO THIBODEAUX RN

## 2022-08-17 NOTE — PLAN OF CARE
Problem: Fluid Imbalance (Pneumonia)  Goal: Fluid Balance  Outcome: Ongoing, Progressing     Problem: Infection (Pneumonia)  Goal: Resolution of Infection Signs and Symptoms  Outcome: Ongoing, Progressing     Problem: Respiratory Compromise (Pneumonia)  Goal: Effective Oxygenation and Ventilation  Outcome: Ongoing, Progressing  Intervention: Promote Airway Secretion Clearance  Recent Flowsheet Documentation  Taken 8/16/2022 2116 by Luke Wu RN  Cough And Deep Breathing: done independently per patient  Intervention: Optimize Oxygenation and Ventilation  Recent Flowsheet Documentation  Taken 8/16/2022 2116 by Luke Wu RN  Head of Bed (HOB) Positioning: HOB elevated     Problem: Adult Inpatient Plan of Care  Goal: Plan of Care Review  Outcome: Ongoing, Progressing  Flowsheets (Taken 8/17/2022 0302)  Outcome Evaluation: Pt admitted to unit for PNA. Pt was seen at ED at [facility]. Pt transferred here for admission. Pt receiving IV ABT per orders. NS running at 75 mL/hr. Pt wearing O2 at 2L per NC, which is her home requirement. Pt C/O pain & cough earlier in this shift with PRN medications administered per orders with + effect noted. Pt on tele with irregular HR noted at times. Pt resting abed at this time. No additional C/O voiced per pt at this time. Will continue to monitor.  Goal: Patient-Specific Goal (Individualized)  Outcome: Ongoing, Progressing  Goal: Absence of Hospital-Acquired Illness or Injury  Outcome: Ongoing, Progressing  Intervention: Identify and Manage Fall Risk  Recent Flowsheet Documentation  Taken 8/17/2022 0214 by Luke Wu RN  Safety Promotion/Fall Prevention: safety round/check completed  Taken 8/17/2022 0013 by Luke Wu RN  Safety Promotion/Fall Prevention: safety round/check completed  Taken 8/16/2022 2240 by Luke Wu, LARISSA  Safety Promotion/Fall Prevention: safety round/check completed  Taken 8/16/2022 2116 by Luke Wu, RN  Safety  Promotion/Fall Prevention:   safety round/check completed   nonskid shoes/slippers when out of bed   fall prevention program maintained   clutter free environment maintained   assistive device/personal items within reach  Intervention: Prevent Skin Injury  Recent Flowsheet Documentation  Taken 8/16/2022 2116 by Luke Wu RN  Body Position:   supine   position changed independently  Intervention: Prevent and Manage VTE (Venous Thromboembolism) Risk  Recent Flowsheet Documentation  Taken 8/16/2022 2116 by Luke Wu RN  Activity Management: activity adjusted per tolerance  Intervention: Prevent Infection  Recent Flowsheet Documentation  Taken 8/17/2022 0214 by Luke Wu RN  Infection Prevention:   hand hygiene promoted   personal protective equipment utilized   rest/sleep promoted   single patient room provided  Taken 8/17/2022 0013 by Luke Wu RN  Infection Prevention:   hand hygiene promoted   personal protective equipment utilized   rest/sleep promoted   single patient room provided  Taken 8/16/2022 2240 by Luke Wu RN  Infection Prevention:   hand hygiene promoted   personal protective equipment utilized   rest/sleep promoted   single patient room provided  Taken 8/16/2022 2116 by Luke Wu RN  Infection Prevention:   hand hygiene promoted   personal protective equipment utilized   rest/sleep promoted   single patient room provided  Goal: Optimal Comfort and Wellbeing  Outcome: Ongoing, Progressing  Intervention: Monitor Pain and Promote Comfort  Recent Flowsheet Documentation  Taken 8/16/2022 2116 by Luke Wu RN  Pain Management Interventions:   care clustered   pillow support provided   position adjusted   quiet environment facilitated   see MAR   unnecessary movement minimized  Intervention: Provide Person-Centered Care  Recent Flowsheet Documentation  Taken 8/16/2022 2116 by Luke Wu RN  Trust Relationship/Rapport:   care explained   choices provided    questions answered   questions encouraged   reassurance provided   thoughts/feelings acknowledged  Goal: Readiness for Transition of Care  Outcome: Ongoing, Progressing     Problem: Skin Injury Risk Increased  Goal: Skin Health and Integrity  Outcome: Ongoing, Progressing  Intervention: Optimize Skin Protection  Recent Flowsheet Documentation  Taken 8/16/2022 2116 by Luke Wu, RN  Head of Bed (HOB) Positioning: HOB elevated   Goal Outcome Evaluation:              Outcome Evaluation: Pt admitted to unit for PNA. Pt was seen at ED at [place] location. Pt transferred here for admission. Pt receiving IV ABT per orders. NS running at 75 mL/hr. Pt wearing O2 at 2L per NC, which is her home requirement. Pt C/O pain & cough earlier in this shift with PRN medications administered per orders with + effect noted. Pt on tele with irregular HR noted at times. Pt resting abed at this time. No additional C/O voiced per pt at this time. Will continue to monitor.

## 2022-08-17 NOTE — THERAPY EVALUATION
Patient Name: Gali Dover  : 1945    MRN: [id number]                              Today's Date: 2022       Admit Date: 8/15/2022    Visit Dx:     ICD-10-CM ICD-9-CM   1. Pneumonia of left lower lobe due to infectious organism  J18.9 486   2. Weakness  R53.1 780.79     Patient Active Problem List   Diagnosis   • Bilateral carotid artery stenosis   • Anxiety   • Asthma   • Paroxysmal atrial fibrillation (HCC)   • Depression   • GERD (gastroesophageal reflux disease)   • Hyperlipidemia   • Essential hypertension   • Polymyalgia    • Giant cell arteritis (HCC)   • Hypothyroid   • Peripheral neuropathy   • Bilateral calf pain   • COPD (chronic obstructive pulmonary disease) with chronic bronchitis ([facility])   • Dizziness on standing   • Pain in right knee   • Primary osteoarthritis of right knee   • Overweight (BMI 25.0-29.9)   • Normocytic anemia due to blood loss   • COPD exacerbation ([facility])   • HCAP (healthcare-associated pneumonia)   • Acute respiratory failure with hypoxia ([facility])   • Pneumonia due to infectious organism   • Pneumonia of left lower lobe due to infectious organism     Past Medical History:   Diagnosis Date   • Anxiety    • Arteritis ([facility])    • Asthma    • Constipation     off and on   • COPD (chronic obstructive pulmonary disease) (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) 2021     Past Surgical History:   Procedure Laterality Date   • CAROTID SUBCLAVIAN BYPASS Left 2021    Procedure: CAROTID SUBCLAVIAN BYPASS;  Surgeon: Navid Hassan MD;  Location: [facility];  Service: Vascular;  Laterality: Left;   •  SECTION     • EYE SURGERY      cat ext   • HARDWARE REMOVAL Right     knee   • HYSTERECTOMY      still has ovaries   • KIDNEY SURGERY      stent placed    • KNEE ARTHROSCOPY Right     • LAPAROSCOPIC CHOLECYSTECTOMY        General Information     Row Name 08/17/22 1329          Physical Therapy Time and Intention    Document Type evaluation  -AM     Mode of Treatment physical therapy  -AM     Row Name 08/17/22 1329          General Information    Patient Profile Reviewed yes  -AM     Prior Level of Function independent:;community mobility;gait;transfer;bed mobility  -AM     Existing Precautions/Restrictions fall;oxygen therapy device and L/min  2L O2  -AM     Barriers to Rehab none identified  -AM     Row Name 08/17/22 1329          Living Environment    People in Home alone  -AM     Row Name 08/17/22 1329          Home Main Entrance    Number of Stairs, Main Entrance none  -AM     Row Name 08/17/22 1329          Stairs Within Home, Primary    Number of Stairs, Within Home, Primary none  -AM     Row Name 08/17/22 1329          Cognition    Orientation Status (Cognition) oriented x 3  -AM     Row Name 08/17/22 1329          Safety Issues, Functional Mobility    Impairments Affecting Function (Mobility) balance;endurance/activity tolerance;pain;shortness of breath;strength  -AM     Comment, Safety Issues/Impairments (Mobility) gait belt utilized  -AM           User Key  (r) = Recorded By, (t) = Taken By, (c) = Cosigned By    Initials Name Provider Type    AM Sammy Donovan, PT Physical Therapist               Mobility     Row Name 08/17/22 1335          Bed Mobility    Bed Mobility bed mobility (all) activities  -AM     All Activities, [place] (Bed Mobility) verbal cues;minimum assist (75% patient effort);1 person assist  -AM     Assistive Device (Bed Mobility) bed rails;head of bed elevated  -AM     Comment, (Bed Mobility) with increased time and effort  -AM     Row Name 08/17/22 1339          Sit-Stand Transfer    Sit-Stand [place] (Transfers) verbal cues;minimum assist (75% patient effort);1 person assist  -AM     Assistive Device (Sit-Stand Transfers) walker, 4-wheeled  -AM     Row  Name 08/17/22 1335          Gait/Stairs (Locomotion)    [place] Level (Gait) contact guard;minimum assist (75% patient effort);1 person assist;verbal cues  -AM     Assistive Device (Gait) walker, front-wheeled  -AM     Distance in Feet (Gait) 25'  -AM     Comment, (Gait/Stairs) very slow joanie, narrow ANGI, unsteady with turns, multiple standing rest breaks  -AM           User Key  (r) = Recorded By, (t) = Taken By, (c) = Cosigned By    Initials Name Provider Type    AM Sammy Donovan, PT Physical Therapist               Obj/Interventions     Row Name 08/17/22 1337          Range of Motion Comprehensive    General Range of Motion no range of motion deficits identified  -AM     Row Name 08/17/22 1337          Strength Comprehensive (MMT)    Comment, General Manual Muscle Testing (MMT) Assessment 4/5 throughout  -AM     Row Name 08/17/22 1337          Motor Skills    Motor Skills functional endurance  -AM     Functional Endurance fair -  -AM     Row Name 08/17/22 1337          Balance    Balance Assessment sitting static balance;sitting dynamic balance;sit to stand dynamic balance;standing static balance;standing dynamic balance  -AM     Static Sitting Balance supervision  -AM     Dynamic Sitting Balance supervision  -AM     Position, Sitting Balance unsupported;sitting edge of bed  -AM     Sit to Stand Dynamic Balance minimal assist  -AM     Static Standing Balance contact guard  -AM     Dynamic Standing Balance minimal assist  -AM     Position/Device Used, Standing Balance walker, front-wheeled  -AM     Row Name 08/17/22 1337          Sensory Assessment (Somatosensory)    Sensory Assessment (Somatosensory) sensation intact  -AM           User Key  (r) = Recorded By, (t) = Taken By, (c) = Cosigned By    Initials Name Provider Type    AM Sammy Donovan, PT Physical Therapist               Goals/Plan     Row Name 08/17/22 1342          Bed Mobility Goal 1 (PT)    Activity/Assistive Device (Bed Mobility Goal 1,  "PT) bed mobility activities, all  -AM     [place] Level/Cues Needed (Bed Mobility Goal 1, PT) modified independence  -AM     Time Frame (Bed Mobility Goal 1, PT) long term goal (LTG)  -AM     Row Name 08/17/22 1342          Transfer Goal 1 (PT)    Activity/Assistive Device (Transfer Goal 1, PT) transfers, all  -AM     [place] Level/Cues Needed (Transfer Goal 1, PT) modified independence  -AM     Time Frame (Transfer Goal 1, PT) long term goal (LTG)  -AM     Row Name 08/17/22 1342          Gait Training Goal 1 (PT)    Activity/Assistive Device (Gait Training Goal 1, PT) gait (walking locomotion);walker, rolling  -AM     [place] Level (Gait Training Goal 1, PT) modified independence  -AM     Distance (Gait Training Goal 1, PT) 150'  -AM     Time Frame (Gait Training Goal 1, PT) long term goal (LTG)  -AM     Row Name 08/17/22 1342          Therapy Assessment/Plan (PT)    Planned Therapy Interventions (PT) balance training;bed mobility training;gait training;strengthening;patient/family education;transfer training  -AM           User Key  (r) = Recorded By, (t) = Taken By, (c) = Cosigned By    Initials Name Provider Type    AM Sammy Donovan, PT Physical Therapist               Clinical Impression     Row Name 08/17/22 1337          Pain    Pretreatment Pain Rating 7/10  -AM     Posttreatment Pain Rating 7/10  -AM     Pain Location - back  chest  -AM     Pre/Posttreatment Pain Comment \"pain from coughing so much\"  -AM     Pain Intervention(s) Repositioned;Emotional support;Therapeutic touch  -AM     Row Name 08/17/22 9217          Plan of Care Review    Plan of Care Reviewed With patient  -AM     Outcome Evaluation Pt is a 78 y/o female admitted with c/o cough and SOB worsening since April, [place].  Chest X-ray: PNA.  Pt reports she lives alone in a 1 story home with no stairs to enter into home.  Pt has been ambulating with RW since April.  Pt currently on 2L O2, IV, and telemetry.  Pt completed bed " mobility with min A, transfers with rW with cga/min A and ambulated 25' with RW with cga/min A.  Pt with slow joanie, unsteadiness and decreased endurance.  Pt required increased time and effort for all mobility tasks this date secondary to fatigue.  Pt with intermittent prolonged coughing during session.  Recommend SNF  -AM     Row Name 08/17/22 1337          Therapy Assessment/Plan (PT)    Patient/Family Therapy Goals Statement (PT) To get stronger  -AM     Rehab Potential (PT) good, to achieve stated therapy goals  -AM     Criteria for Skilled Interventions Met (PT) yes  -AM     Therapy Frequency (PT) 5 times/wk  -AM     Predicted Duration of Therapy Intervention (PT) until d/c  -AM     Row Name 08/17/22 1337          Vital Signs    O2 Delivery Pre Treatment nasal cannula  2L O2  -AM     O2 Delivery Intra Treatment nasal cannula  -AM     O2 Delivery Post Treatment nasal cannula  -AM     Pre Patient Position Supine  -AM     Intra Patient Position Standing  -AM     Post Patient Position Supine  -AM     Row Name 08/17/22 1337          Positioning and Restraints    Pre-Treatment Position in bed  -AM     Post Treatment Position bed  -AM     In Bed supine;call light within reach;encouraged to call for assist;exit alarm on  -AM           User Key  (r) = Recorded By, (t) = Taken By, (c) = Cosigned By    Initials Name Provider Type    AM Sammy Donovan, PT Physical Therapist               Outcome Measures     Row Name 08/17/22 1342 08/17/22 0830       How much help from another person do you currently need...    Turning from your back to your side while in flat bed without using bedrails? 3  -AM 3  -BD    Moving from lying on back to sitting on the side of a flat bed without bedrails? 3  -AM 3  -BD    Moving to and from a bed to a chair (including a wheelchair)? 3  -AM 3  -BD    Standing up from a chair using your arms (e.g., wheelchair, bedside chair)? 3  -AM 3  -BD    Climbing 3-5 steps with a railing? 2  -AM 3  -BD     To walk in hospital room? 3  -AM 3  -BD    AM-PAC 6 Clicks Score (PT) 17  -AM 18  -BD    Highest level of mobility 5 --> Static standing  -AM 6 --> Walked 10 steps or more  -BD          User Key  (r) = Recorded By, (t) = Taken By, (c) = Cosigned By    Initials Name Provider Type    BD Salma Moya RN Registered Nurse    Sammy Ovalle, JASON Physical Therapist                             Physical Therapy Education                 Title: PT OT SLP Therapies (Done)     Topic: Physical Therapy (Done)     Point: Mobility training (Done)     Learning Progress Summary           Patient Acceptance, E,TB, VU by AM at 8/17/2022 1343                   Point: Home exercise program (Done)     Learning Progress Summary           Patient Acceptance, E,TB, VU by AM at 8/17/2022 1343                   Point: Body mechanics (Done)     Learning Progress Summary           Patient Acceptance, E,TB, VU by AM at 8/17/2022 1343                   Point: Precautions (Done)     Learning Progress Summary           Patient Acceptance, E,TB, VU by AM at 8/17/2022 1343                               User Key     Initials Effective Dates Name Provider Type Discipline    AM 05/10/21 -  Sammy Donovan, JASON Physical Therapist PT              PT Recommendation and Plan  Planned Therapy Interventions (PT): balance training, bed mobility training, gait training, strengthening, patient/family education, transfer training  Plan of Care Reviewed With: patient  Outcome Evaluation: Pt is a 76 y/o female admitted with c/o cough and SOB worsening since April, [facility].  Chest X-ray: PNA.  Pt reports she lives alone in a 1 story home with no stairs to enter into home.  Pt has been ambulating with RW since April.  Pt currently on 2L O2, IV, and telemetry.  Pt completed bed mobility with min A, transfers with rW with cga/min A and ambulated 25' with RW with cga/min A.  Pt with slow joanie, unsteadiness and decreased endurance.  Pt required increased time and effort  for all mobility tasks this date secondary to fatigue.  Pt with intermittent prolonged coughing during session.  Recommend SNF     Time Calculation:    PT Charges     Row Name 08/17/22 1343             Time Calculation    Start Time 0914  -AM      Stop Time 0938  -AM      Time Calculation (min) 24 min  -AM      PT Received On 08/17/22  -AM      PT - Next Appointment 08/18/22  -AM      PT Goal Re-Cert Due Date 08/31/22  -AM            User Key  (r) = Recorded By, (t) = Taken By, (c) = Cosigned By    Initials Name Provider Type    Sammy Ovalle, PT Physical Therapist              Therapy Charges for Today     Code Description Service Date Service Provider Modifiers Qty    01135229906 HC PT EVAL MOD COMPLEXITY 4 8/17/2022 Sammy Donovan, PT GP 1          PT G-Codes  AM-PAC 6 Clicks Score (PT): 17    Sammy Donovan, PT  8/17/2022

## 2022-08-18 ENCOUNTER — READMISSION MANAGEMENT (OUTPATIENT)
Dept: CALL CENTER | Facility: HOSPITAL | Age: 77
End: 2022-08-18

## 2022-08-18 ENCOUNTER — APPOINTMENT (OUTPATIENT)
Dept: CT IMAGING | Facility: HOSPITAL | Age: 77
End: 2022-08-18

## 2022-08-18 VITALS
BODY MASS INDEX: 24.74 KG/M2 | RESPIRATION RATE: 18 BRPM | DIASTOLIC BLOOD PRESSURE: 61 MMHG | SYSTOLIC BLOOD PRESSURE: 109 MMHG | TEMPERATURE: 97.8 F | HEIGHT: 65 IN | WEIGHT: 148.5 LBS | OXYGEN SATURATION: 96 % | HEART RATE: 74 BPM

## 2022-08-18 LAB
ALBUMIN SERPL-MCNC: 2.5 G/DL (ref 3.5–5.2)
ALBUMIN/GLOB SERPL: 0.7 G/DL
ALP SERPL-CCNC: 33 U/L (ref 39–117)
ALT SERPL W P-5'-P-CCNC: 16 U/L (ref 1–33)
ANION GAP SERPL CALCULATED.3IONS-SCNC: 13 MMOL/L (ref 5–15)
AST SERPL-CCNC: 29 U/L (ref 1–32)
BASOPHILS # BLD AUTO: 0.1 10*3/MM3 (ref 0–0.2)
BASOPHILS NFR BLD AUTO: 0.5 % (ref 0–1.5)
BILIRUB SERPL-MCNC: <0.2 MG/DL (ref 0–1.2)
BUN SERPL-MCNC: 5 MG/DL (ref 8–23)
BUN/CREAT SERPL: 13.2 (ref 7–25)
CALCIUM SPEC-SCNC: 8.4 MG/DL (ref 8.6–10.5)
CHLORIDE SERPL-SCNC: 97 MMOL/L (ref 98–107)
CO2 SERPL-SCNC: 24 MMOL/L (ref 22–29)
CREAT SERPL-MCNC: 0.38 MG/DL (ref 0.57–1)
DEPRECATED RDW RBC AUTO: 46.4 FL (ref 37–54)
EGFRCR SERPLBLD CKD-EPI 2021: 102.7 ML/MIN/1.73
EOSINOPHIL # BLD AUTO: 0 10*3/MM3 (ref 0–0.4)
EOSINOPHIL NFR BLD AUTO: 0 % (ref 0.3–6.2)
ERYTHROCYTE [DISTWIDTH] IN BLOOD BY AUTOMATED COUNT: 15 % (ref 12.3–15.4)
GLOBULIN UR ELPH-MCNC: 3.6 GM/DL
GLUCOSE SERPL-MCNC: 290 MG/DL (ref 65–99)
HCT VFR BLD AUTO: 25.7 % (ref 34–46.6)
HGB BLD-MCNC: 8.6 G/DL (ref 12–15.9)
LYMPHOCYTES # BLD AUTO: 0.7 10*3/MM3 (ref 0.7–3.1)
LYMPHOCYTES NFR BLD AUTO: 5.3 % (ref 19.6–45.3)
MCH RBC QN AUTO: 29 PG (ref 26.6–33)
MCHC RBC AUTO-ENTMCNC: 33.5 G/DL (ref 31.5–35.7)
MCV RBC AUTO: 86.5 FL (ref 79–97)
MONOCYTES # BLD AUTO: 0.3 10*3/MM3 (ref 0.1–0.9)
MONOCYTES NFR BLD AUTO: 2.2 % (ref 5–12)
NEUTROPHILS NFR BLD AUTO: 12.6 10*3/MM3 (ref 1.7–7)
NEUTROPHILS NFR BLD AUTO: 92 % (ref 42.7–76)
NRBC BLD AUTO-RTO: 0 /100 WBC (ref 0–0.2)
PLATELET # BLD AUTO: 786 10*3/MM3 (ref 140–450)
PMV BLD AUTO: 8.9 FL (ref 6–12)
POTASSIUM SERPL-SCNC: 3.3 MMOL/L (ref 3.5–5.2)
POTASSIUM SERPL-SCNC: 3.6 MMOL/L (ref 3.5–5.2)
PROT SERPL-MCNC: 6.1 G/DL (ref 6–8.5)
RBC # BLD AUTO: 2.97 10*6/MM3 (ref 3.77–5.28)
SODIUM SERPL-SCNC: 134 MMOL/L (ref 136–145)
WBC NRBC COR # BLD: 13.6 10*3/MM3 (ref 3.4–10.8)

## 2022-08-18 PROCEDURE — 87070 CULTURE OTHR SPECIMN AEROBIC: CPT | Performed by: INTERNAL MEDICINE

## 2022-08-18 PROCEDURE — 87205 SMEAR GRAM STAIN: CPT | Performed by: INTERNAL MEDICINE

## 2022-08-18 PROCEDURE — 25010000002 ENOXAPARIN PER 10 MG: Performed by: INTERNAL MEDICINE

## 2022-08-18 PROCEDURE — 71275 CT ANGIOGRAPHY CHEST: CPT

## 2022-08-18 PROCEDURE — 25010000002 AZITHROMYCIN PER 500 MG: Performed by: NURSE PRACTITIONER

## 2022-08-18 PROCEDURE — 97112 NEUROMUSCULAR REEDUCATION: CPT

## 2022-08-18 PROCEDURE — 0 IOPAMIDOL PER 1 ML: Performed by: INTERNAL MEDICINE

## 2022-08-18 PROCEDURE — 94799 UNLISTED PULMONARY SVC/PX: CPT

## 2022-08-18 PROCEDURE — 97116 GAIT TRAINING THERAPY: CPT

## 2022-08-18 PROCEDURE — 99239 HOSP IP/OBS DSCHRG MGMT >30: CPT | Performed by: INTERNAL MEDICINE

## 2022-08-18 PROCEDURE — 80053 COMPREHEN METABOLIC PANEL: CPT | Performed by: INTERNAL MEDICINE

## 2022-08-18 PROCEDURE — 85025 COMPLETE CBC W/AUTO DIFF WBC: CPT | Performed by: INTERNAL MEDICINE

## 2022-08-18 PROCEDURE — 25010000002 METHYLPREDNISOLONE PER 40 MG: Performed by: INTERNAL MEDICINE

## 2022-08-18 PROCEDURE — 25010000002 CEFTRIAXONE PER 250 MG: Performed by: NURSE PRACTITIONER

## 2022-08-18 PROCEDURE — 94664 DEMO&/EVAL PT USE INHALER: CPT

## 2022-08-18 RX ORDER — CEFDINIR 300 MG/1
300 CAPSULE ORAL 2 TIMES DAILY
Qty: 14 CAPSULE | Refills: 0 | Status: SHIPPED | OUTPATIENT
Start: 2022-08-18

## 2022-08-18 RX ORDER — PREDNISONE 10 MG/1
TABLET ORAL
Qty: 18 TABLET | Refills: 0 | Status: SHIPPED | OUTPATIENT
Start: 2022-08-18

## 2022-08-18 RX ORDER — GUAIFENESIN 600 MG/1
600 TABLET, EXTENDED RELEASE ORAL 2 TIMES DAILY
Qty: 30 TABLET | Refills: 0 | Status: SHIPPED | OUTPATIENT
Start: 2022-08-18

## 2022-08-18 RX ORDER — METHYLPREDNISOLONE SODIUM SUCCINATE 40 MG/ML
20 INJECTION, POWDER, LYOPHILIZED, FOR SOLUTION INTRAMUSCULAR; INTRAVENOUS EVERY 8 HOURS
Status: DISCONTINUED | OUTPATIENT
Start: 2022-08-18 | End: 2022-08-18 | Stop reason: HOSPADM

## 2022-08-18 RX ORDER — GUAIFENESIN AND CODEINE PHOSPHATE 100; 10 MG/5ML; MG/5ML
5 SOLUTION ORAL EVERY 4 HOURS PRN
Qty: 236 ML | Refills: 0 | Status: SHIPPED | OUTPATIENT
Start: 2022-08-18

## 2022-08-18 RX ORDER — TOCILIZUMAB 180 MG/ML
1 INJECTION, SOLUTION SUBCUTANEOUS
Qty: 3.9 ML | Refills: 0
Start: 2022-08-24

## 2022-08-18 RX ADMIN — Medication 1 CAPSULE: at 08:36

## 2022-08-18 RX ADMIN — HYDRALAZINE HYDROCHLORIDE 100 MG: 25 TABLET, FILM COATED ORAL at 08:34

## 2022-08-18 RX ADMIN — PANTOPRAZOLE SODIUM 80 MG: 40 TABLET, DELAYED RELEASE ORAL at 08:35

## 2022-08-18 RX ADMIN — HYDROCODONE BITARTRATE AND ACETAMINOPHEN 1 TABLET: 5; 325 TABLET ORAL at 17:24

## 2022-08-18 RX ADMIN — METOPROLOL SUCCINATE 50 MG: 50 TABLET, EXTENDED RELEASE ORAL at 06:24

## 2022-08-18 RX ADMIN — AZITHROMYCIN MONOHYDRATE 500 MG: 500 INJECTION, POWDER, LYOPHILIZED, FOR SOLUTION INTRAVENOUS at 01:43

## 2022-08-18 RX ADMIN — LOSARTAN POTASSIUM 25 MG: 25 TABLET, FILM COATED ORAL at 06:24

## 2022-08-18 RX ADMIN — HYDRALAZINE HYDROCHLORIDE 100 MG: 25 TABLET, FILM COATED ORAL at 17:19

## 2022-08-18 RX ADMIN — GUAIFENESIN AND CODEINE PHOSPHATE 5 ML: 10; 100 LIQUID ORAL at 08:35

## 2022-08-18 RX ADMIN — ASPIRIN 81 MG: 81 TABLET, COATED ORAL at 08:35

## 2022-08-18 RX ADMIN — GABAPENTIN 600 MG: 300 CAPSULE ORAL at 08:35

## 2022-08-18 RX ADMIN — FUROSEMIDE 10 MG: 20 TABLET ORAL at 06:24

## 2022-08-18 RX ADMIN — HYDROCODONE BITARTRATE AND ACETAMINOPHEN 1 TABLET: 5; 325 TABLET ORAL at 08:35

## 2022-08-18 RX ADMIN — GABAPENTIN 600 MG: 300 CAPSULE ORAL at 17:19

## 2022-08-18 RX ADMIN — SPIRONOLACTONE 25 MG: 25 TABLET ORAL at 08:35

## 2022-08-18 RX ADMIN — ESCITALOPRAM OXALATE 10 MG: 10 TABLET ORAL at 06:23

## 2022-08-18 RX ADMIN — CEFTRIAXONE 1 G: 1 INJECTION, POWDER, FOR SOLUTION INTRAMUSCULAR; INTRAVENOUS at 01:07

## 2022-08-18 RX ADMIN — GUAIFENESIN AND CODEINE PHOSPHATE 5 ML: 10; 100 LIQUID ORAL at 17:24

## 2022-08-18 RX ADMIN — Medication 10 ML: at 08:34

## 2022-08-18 RX ADMIN — AZELASTINE HYDROCHLORIDE 1 SPRAY: 137 SPRAY, METERED NASAL at 08:37

## 2022-08-18 RX ADMIN — LEVOTHYROXINE SODIUM 25 MCG: 0.03 TABLET ORAL at 05:54

## 2022-08-18 RX ADMIN — DIGOXIN 250 MCG: 250 TABLET ORAL at 13:13

## 2022-08-18 RX ADMIN — NIFEDIPINE 60 MG: 30 TABLET, FILM COATED, EXTENDED RELEASE ORAL at 08:35

## 2022-08-18 RX ADMIN — POTASSIUM CHLORIDE 40 MEQ: 1500 TABLET, EXTENDED RELEASE ORAL at 17:24

## 2022-08-18 RX ADMIN — Medication 2000 UNITS: at 08:34

## 2022-08-18 RX ADMIN — IOPAMIDOL 100 ML: 755 INJECTION, SOLUTION INTRAVENOUS at 15:31

## 2022-08-18 RX ADMIN — BUDESONIDE 0.5 MG: 0.5 INHALANT RESPIRATORY (INHALATION) at 07:39

## 2022-08-18 RX ADMIN — CYCLOSPORINE 1 DROP: 0.5 EMULSION OPHTHALMIC at 08:36

## 2022-08-18 RX ADMIN — ARFORMOTEROL TARTRATE 15 MCG: 15 SOLUTION RESPIRATORY (INHALATION) at 07:35

## 2022-08-18 RX ADMIN — POTASSIUM CHLORIDE 10 MEQ: 750 TABLET, EXTENDED RELEASE ORAL at 08:35

## 2022-08-18 RX ADMIN — METHYLPREDNISOLONE SODIUM SUCCINATE 20 MG: 40 INJECTION, POWDER, FOR SOLUTION INTRAMUSCULAR; INTRAVENOUS at 13:13

## 2022-08-18 RX ADMIN — METHYLPREDNISOLONE SODIUM SUCCINATE 40 MG: 40 INJECTION, POWDER, FOR SOLUTION INTRAMUSCULAR; INTRAVENOUS at 04:06

## 2022-08-18 RX ADMIN — POTASSIUM CHLORIDE 40 MEQ: 1500 TABLET, EXTENDED RELEASE ORAL at 13:16

## 2022-08-18 RX ADMIN — ENOXAPARIN SODIUM 40 MG: 100 INJECTION SUBCUTANEOUS at 17:19

## 2022-08-18 NOTE — CASE MANAGEMENT/SOCIAL WORK
Discharge Planning Assessment  [facility]     Patient Name: Gali Dover  MRN: [id number]  Today's Date: 8/18/2022    Admit Date: 8/15/2022     Discharge Needs Assessment     Row Name 08/18/22 1033       Living Environment    People in Home alone    Current Living Arrangements home    Primary Care Provided by self    Provides Primary Care For no one, unable/limited ability to care for self    Family Caregiver if Needed child(rocío), adult    Family Caregiver Names Son    Quality of Family Relationships unable to assess  No family present during CM rounds    Able to Return to Prior Arrangements yes       Resource/Environmental Concerns    Resource/Environmental Concerns none    Transportation Concerns none       Transition Planning    Patient/Family Anticipates Transition to home    Patient/Family Anticipated Services at Transition none    Transportation Anticipated family or friend will provide       Discharge Needs Assessment    Readmission Within the Last 30 Days no previous admission in last 30 days    Equipment Currently Used at Home walker, rolling;shower chair;oxygen    Concerns to be Addressed discharge planning    Anticipated Changes Related to Illness none    Equipment Needed After Discharge none    Discharge Facility/Level of Care Needs nursing facility, skilled    Current Discharge Risk lives alone               Discharge Plan     Row Name 08/18/22 1034       Plan    Plan D/C plan: Home vs SNF, pending pt decision, No precert required for SNF    Patient/Family in Agreement with Plan yes    Plan Comments Met with pt at bedside. Pt lives at home alone, her son lives in the neighborhood. Pt has not been driving lately. Transportation provided by [facility] or [facility], and will at time of d/c. Pt has rolling walker, shower chair, and oxygen at home. She uses 2L and that is supplied by [facility]. PCP and pharmacy confirmed. No financial barriers to medications. Has used HH in the past, nothing currently. PT  recommends SNF, CM will discuss with pt at bedside and provide list of options.                   Expected Discharge Date and Time     Expected Discharge Date Expected Discharge Time    Aug 19, 2022          Demographic Summary     Row Name 08/18/22 1033       General Information    Admission Type inpatient    Arrived From emergency department    Required Notices Provided Important Message from Medicare    Referral Source admission list    Reason for Consult discharge planning    Preferred Language English               Functional Status     Row Name 08/18/22 1033       Functional Status    Usual Activity Tolerance moderate    Current Activity Tolerance moderate       Functional Status, IADL    Medications assistive equipment    Meal Preparation assistive equipment    Housekeeping assistive equipment    Laundry assistive equipment    Shopping assistive equipment              Met with patient in room wearing PPE: mask  Maintained distance greater than six feet and spent less than 15 minutes in the room.                 DAGO THIBODEAUX RN

## 2022-08-18 NOTE — THERAPY TREATMENT NOTE
"Subjective: Pt agreeable to therapeutic plan of care.    Objective:     Bed mobility - Modified-Independent  Transfers - SBA  Ambulation - 75 feet SBA   Therex: seated LAQs, MIP 2x10  NMR: standing reaching within ANGI/to limits of stability w/o UE support to promote postural stability, proprioceptive input and balance recovery strategies.    Vitals: WNL    Pain: 0 VAS  Education: Provided education on importance of mobility and skilled verbal / tactile cueing throughout intervention.     Assessment: Gali Dover presents with functional mobility impairments which indicate the need for skilled intervention. Pt with increased gait distances and stability during functional mobility tasks noted this date. Pt completed session with 2L NC and sats maintains WFL. Pt amb 75ft with FWW and SBA. Pt also instructed in standing balance tasks including min reaching within ANGI and to limits of stability without UE support with no LOB noted. Discussed pt utilizing FWW if pt to DC home for increased safety as well as allowing family to assist with pt agreeable. Pending pt continued progress recommended home with HH versus SNF. Tolerating session today without incident. Will continue to follow and progress as tolerated.     Plan/Recommendations:   Low Intensity Therapy recommended post-acute care - This is recommended as therapy feels this patient would require 2-3 visits per week. OP or HH would be the best option depending on patient's home bound status. Consider, if the patient has other  \"skilled\" needs such as wounds, IV antibiotics, etc. Combined with \"low intensity\" could also equate to a SNF. If patient is medically complex, consider LTAC.. Pt requires no DME at discharge.     Pt desires Home with Home Health and Home with family assist if pt continues to improve at discharge. Pt cooperative; agreeable to therapeutic recommendations and plan of care.         Basic Mobility 6-click:  Rollin = Total, A lot = 2, A " little = 3; 4 = None  Supine>Sit:   1 = Total, A lot = 2, A little = 3; 4 = None   Sit>Stand with arms:  1 = Total, A lot = 2, A little = 3; 4 = None  Bed>Chair:   1 = Total, A lot = 2, A little = 3; 4 = None  Ambulate in room:  1 = Total, A lot = 2, A little = 3; 4 = None  3-5 Steps with railin = Total, A lot = 2, A little = 3; 4 = None  Score: 19    Modified [place]: N/A = No pre-op stroke/TIA    Post-Tx Position: Supine with HOB Elevated, Alarms activated and Call light and personal items within reach  PPE: gloves, surgical mask, eyewear protection

## 2022-08-18 NOTE — PROGRESS NOTES
[facility] Medicine Services Daily Progress Note    Patient Name: Gali Dover  : 1945  MRN: [id number]  Primary Care Physician:  Chris Pedro MD  Date of admission: 8/15/2022      Subjective      Chief Complaint: dyspnea    History of Present Illness: Gali Dover is a 77 y.o. female who presented to [facility] on 8/15/2022 complaining of cough and shortness of breath which has been worsening since 2022.  The patient has seen her PCP and had 2 rounds of p.o. antibiotics and steroids.  She reports she will have some improvement and then has relapse with subjective fever, chills, cough, and increased shortness of breath.  The patient denies lower extremity edema.  The patient has been on oxygen at 2 L per nasal cannula times several years but has not needed oxygen during the day until recently.  She has had to wear her oxygen all the time because she gets extremely short of breath with any exertion.  Patient has had multiple test for COVID-19 and respiratory viral panel which have all been negative.        22 patient seen and examined in bed no acute distress, complaining cough and dyspnea.  Presently on 2 L of oxygen.  Awaiting antibiotics.  Afebrile,  2022 patient seen and examined in bed no acute distress, vital signs stable, dyspnea on 2 L of oxygen, tolerating antibiotics.  Discussed with RN.  Elevated D-dimer will order CT chest.    ROS   Constitutional: Positive for chills, fever and malaise/fatigue.   Cardiovascular: Positive for dyspnea on exertion. Negative for chest pain, leg swelling and orthopnea.   Respiratory: Positive for cough, shortness of breath, sleep disturbances due to breathing, sputum production and wheezing.    Gastrointestinal: Negative for abdominal pain.   Genitourinary: Positive for dysuria.   All other systems reviewed and are negative.      Objective      Vitals:   Temp:  [97.4 °F (36.3 °C)-98.1 °F (36.7 °C)] 97.6 °F (36.4  °C)  Heart Rate:  [71-92] 76  Resp:  [16-20] 20  BP: (101-150)/(43-77) 121/48  Flow (L/min):  [2-3] 3    Physical Exam Vitals and nursing note reviewed.   Constitutional:       General: She is not in acute distress.     Appearance: Normal appearance. She is ill-appearing. She is not toxic-appearing or diaphoretic.   HENT:      Head: Normocephalic.      Right Ear: External ear normal.      Left Ear: External ear normal.      Nose: Nose normal.      Mouth/Throat:      Mouth: Mucous membranes are moist.   Eyes:      General: No scleral icterus.        Right eye: No discharge.         Left eye: No discharge.      Extraocular Movements: Extraocular movements intact.      Conjunctiva/sclera: Conjunctivae normal.      Pupils: Pupils are equal, round, and reactive to light.   Cardiovascular:      Rate and Rhythm: Normal rate. Rhythm irregular. Frequent extrasystoles are present.     Pulses: Normal pulses.      Heart sounds: Murmur heard.    Systolic murmur is present with a grade of 4/6.    No friction rub.   Pulmonary:      Effort: Pulmonary effort is normal.      Breath sounds: Normal breath sounds.   Abdominal:      General: Bowel sounds are normal.      Palpations: Abdomen is soft.   Musculoskeletal:         General: Normal range of motion.      Cervical back: Normal range of motion and neck supple.      Right lower leg: No edema.      Left lower leg: No edema.   Skin:     General: Skin is warm and dry.   Neurological:      Mental Status: She is alert.   Psychiatric:         Attention and Perception: Attention and perception normal. She is attentive.         Mood and Affect: Mood is anxious.         Speech: Speech normal.         Behavior: Behavior normal.         Thought Content: Thought content normal.         Cognition and Memory: Cognition and memory normal.         Judgment: Judgment is not impulsive or inappropriate.         Result Review    Result Review:  I have personally reviewed the results from the time of  this admission to 8/18/2022 10:03 EDT and agree with these findings:  []  Laboratory  []  Microbiology  []  Radiology  []  EKG/Telemetry   []  Cardiology/Vascular   []  Pathology  []  Old records  []  Other:  Most notable findings include:   CMP:        Lab 08/17/22  2354 08/17/22  1036 08/17/22  0123 08/15/22  2238   SODIUM  --   --  134* 132*   POTASSIUM 3.6  --  3.0* 3.8   CHLORIDE  --   --  98 97*   CO2  --   --  22.0 21.4*   ANION GAP  --   --  14.0 13.6   BUN  --   --  7* 9   CREATININE  --   --  0.33* 0.62   EGFR  --   --  106.3 91.3   GLUCOSE  --   --  92 92   CALCIUM  --   --  7.9* 8.5*   IONIZED CALCIUM  --   --  1.15*  --    MAGNESIUM  --  1.6  --   --    TOTAL PROTEIN  --   --  5.5*  --    ALBUMIN  --   --  2.20*  --    GLOBULIN  --   --  3.3  --    ALT (SGPT)  --   --  13  --    AST (SGOT)  --   --  23  --    BILIRUBIN  --   --  <0.2  --    ALK PHOS  --   --  31*  --      CBC:      Lab 08/17/22  1036 08/15/22  2218   WBC 17.00* 15.82*   HEMOGLOBIN 8.6* 10.1*   HEMATOCRIT 25.5* 29.2*   PLATELETS 671* 670*   NEUTROS ABS 14.45* 13.55*   IMMATURE GRANS (ABS)  --  0.26*   LYMPHS ABS  --  0.95   MONOS ABS  --  0.93*   EOS ABS  --  0.08   MCV 86.9 86.4             Assessment & Plan      Brief Patient Summary:  Gali Dover is a 77 y.o. female who       arformoterol, 15 mcg, Nebulization, BID - RT  aspirin, 81 mg, Oral, Daily  atorvastatin, 10 mg, Oral, Daily  azelastine, 1 spray, Each Nare, Daily  azithromycin, 500 mg, Intravenous, Q24H  budesonide, 0.5 mg, Nebulization, BID - RT  cefTRIAXone, 1 g, Intravenous, Q24H  cholecalciferol, 2,000 Units, Oral, Daily  cycloSPORINE, 1 drop, Both Eyes, BID  digoxin, 250 mcg, Oral, Daily  enoxaparin, 40 mg, Subcutaneous, Daily  escitalopram, 10 mg, Oral, QAM  furosemide, 10 mg, Oral, QAM  gabapentin, 600 mg, Oral, TID  guaiFENesin, 600 mg, Oral, BID  hydrALAZINE, 100 mg, Oral, TID  lactobacillus acidophilus, 1 capsule, Oral, Daily  levothyroxine, 25 mcg, Oral,  QAM  losartan, 25 mg, Oral, QAM  methylPREDNISolone sodium succinate, 40 mg, Intravenous, Q8H  metoprolol succinate XL, 50 mg, Oral, QAM  montelukast, 10 mg, Oral, Nightly  NIFEdipine XL, 60 mg, Oral, Q24H  pantoprazole, 80 mg, Oral, Daily  potassium chloride, 10 mEq, Oral, Daily  sodium chloride, 10 mL, Intravenous, Q12H  spironolactone, 25 mg, Oral, Daily             Active Hospital Problems:  Active Hospital Problems    Diagnosis    • **Pneumonia of left lower lobe due to infectious organism    • COPD exacerbation (HCC)    • Polymyalgia     • Hypothyroid    • Hyperlipidemia    • Paroxysmal atrial fibrillation (HCC)    • Anxiety    • GERD (gastroesophageal reflux disease)      Shortness of breath, uncertain etiology--consideration for community-acquired pneumonia; consideration for cardiac cause:   -troponin; neg  -echocardiogram; Calculated left ventricular EF = 62% Estimated left ventricular EF was in agreement with the calculated left ventricular EF.  -guaifenesin with codeine  -Grade 4/6 systolic murmur  -Review of echocardiogram from  7/19/2021 shows EF 70%     Community-acquired pneumonia, bibasilar with outpatient treatment failure:  - IV Rocephin; IV azithromycin  -Sputum culture blood cultures, Legionella pneumococcal antigen.  -Patient reports treatment with 2 antibiotic courses and steroids since April 2022     COPD, chronic:   -continue  DuoNeb;   -continue Astelin;    -Hold Symbicort;  - Brovana budesonide  continue Singulair ;   hold Flonase     Atrial fibrillation, chronic, without anticoagulation therapy due to GI bleed in past:   -continue digoxin; check digoxin level; continue aspirin; continue metoprolol      Hyponatremia, mild, sodium 132 with CO2 21.4: repeat BMP in a.m .      HLD, choronic: hold Zetia as it is no formulary   Lasix gabapentin     Hypothyroidism, chorionic: continue  levothyroxine      HTN, chronic: continue losartan; continue metoprolol      GERD, chronic: continue  Protonix     Low serum IgG: patient receives  immunoglobulin      DVT prophylaxis:  Medical DVT prophylaxis orders are present.    CODE STATUS:    Level Of Support Discussed With: Patient  Code Status (Patient has no pulse and is not breathing): CPR (Attempt to Resuscitate)  Medical Interventions (Patient has pulse or is breathing): Full Support      Disposition:  I expect patient to be discharged home.    This patient has been examined wearing appropriate Personal Protective Equipment and discussed with rn. 08/18/22      Electronically signed by Mitesh Benavides MD, 08/18/22, 10:03 EDT.  [facility] Hospitalist Team

## 2022-08-18 NOTE — CASE MANAGEMENT/SOCIAL WORK
Continued Stay Note  SANDRA Christensen     Patient Name: Gali Dover  MRN: [id number]  Today's Date: 8/18/2022    Admit Date: 8/15/2022     Discharge Plan     Row Name 08/18/22 1449       Plan    Plan D/C plan: Anticipate routine home. Declines SNF/HH.    Patient/Family in Agreement with Plan yes    Provided Post Acute Provider List? Refused    Refused Provider List Comment Pt declined SNF/HH    Plan Comments CM met with pt at bedside to discuss PT recommendations of SNF. Pt declines rehab, states she feels strong enough to go home and she has family very close by. CM offered HH, pt declines this as well. CM advised pt that if she changes her mind, to reach out to CM before she gets d/c.                   Expected Discharge Date and Time     Expected Discharge Date Expected Discharge Time    Aug 19, 2022         Met with patient in room wearing PPE: mask  Maintained distance greater than six feet and spent less than 15 minutes in the room.          DAGO THIBODEAUX RN

## 2022-08-18 NOTE — PLAN OF CARE
"Assessment: Gali Dover presents with functional mobility impairments which indicate the need for skilled intervention. Pt with increased gait distances and stability during functional mobility tasks noted this date. Pt completed session with 2L NC and sats maintains WFL. Pt amb 75ft with FWW and SBA. Pt also instructed in standing balance tasks including min reaching within ANGI and to limits of stability without UE support with no LOB noted. Discussed pt utilizing FWW if pt to DC home for increased safety as well as allowing family to assist with pt agreeable. Pending pt continued progress recommended home with HH versus SNF. Tolerating session today without incident. Will continue to follow and progress as tolerated.     Plan/Recommendations:   Low Intensity Therapy recommended post-acute care - This is recommended as therapy feels this patient would require 2-3 visits per week. OP or HH would be the best option depending on patient's home bound status. Consider, if the patient has other  \"skilled\" needs such as wounds, IV antibiotics, etc. Combined with \"low intensity\" could also equate to a SNF. If patient is medically complex, consider LTAC.. Pt requires no DME at discharge.     Pt desires Home with Home Health and Home with family assist if pt continues to improve at discharge. Pt cooperative; agreeable to therapeutic recommendations and plan of care.   "

## 2022-08-18 NOTE — DISCHARGE SUMMARY
[facility] Medicine Services  DISCHARGE SUMMARY    Patient Name: Gali Dover  : 1945  MRN: [id number]    Date of Admission: 8/15/2022  Discharge Diagnosis: copd AE/pna  Date of Discharge:  22  Primary Care Physician: Chris Pedro MD      Presenting Problem:   Weakness [R53.1]  Pneumonia of left lower lobe due to infectious organism [J18.9]    Active and Resolved Hospital Problems:  Active Hospital Problems    Diagnosis POA   • **Pneumonia of left lower lobe due to infectious organism [J18.9] Yes   • COPD exacerbation (HCC) [J44.1] Yes   • Polymyalgia  [M35.3] Yes   • Hypothyroid [E03.9] Yes   • Hyperlipidemia [E78.5] Yes   • Paroxysmal atrial fibrillation (HCC) [I48.0] Yes   • Anxiety [F41.9] Yes   • GERD (gastroesophageal reflux disease) [K21.9] Yes      Resolved Hospital Problems   No resolved problems to display.     Shortness of breath, uncertain etiology--consideration for community-acquired pneumonia; consideration for cardiac cause:   -troponin; neg  -echocardiogram; Calculated left ventricular EF = 62% Estimated left ventricular EF was in agreement with the calculated left ventricular EF.  -guaifenesin with codeine  -Grade 4/6 systolic murmur  -Review of echocardiogram from  2021 shows EF 70%     Community-acquired pneumonia, bibasilar with outpatient treatment failure:  - IV Rocephin; IV azithromycin  -Sputum culture blood cultures, Legionella pneumococcal antigen.  -Patient reports treatment with 2 antibiotic courses and steroids since 2022     COPD, chronic:   -continue  DuoNeb;   -continue Astelin;    - Symbicort;  -continue Singulair ;   -Flonase     Atrial fibrillation, chronic, without anticoagulation therapy due to GI bleed in past:   -continue digoxin; check digoxin level; continue aspirin; continue metoprolol      Hyponatremia, mild, sodium 132 with CO2 21.4: repeat BMP in a.m .      HLD, choronic: hold Zetia as it is no formulary   Lasix  gabapentin     Hypothyroidism, chorionic: continue  levothyroxine      HTN, chronic: continue losartan; continue metoprolol      GERD, chronic: continue Protonix     Low serum IgG: patient receives  immunoglobulin       Hospital Course     Hospital Course:   History of Present Illness: Gali Dover is a 77 y.o. female who presented to [facility] on 8/15/2022 complaining of cough and shortness of breath which has been worsening since April 2022.  The patient has seen her PCP and had 2 rounds of p.o. antibiotics and steroids.  She reports she will have some improvement and then has relapse with subjective fever, chills, cough, and increased shortness of breath.  The patient denies lower extremity edema.  The patient has been on oxygen at 2 L per nasal cannula times several years but has not needed oxygen during the day until recently.  She has had to wear her oxygen all the time because she gets extremely short of breath with any exertion.  Patient has had multiple test for COVID-19 and respiratory viral panel which have all been negative.     8/17/22 patient seen and examined in bed no acute distress, complaining cough and dyspnea.  Presently on 2 L of oxygen.  Awaiting antibiotics.  Afebrile,  8/18/2022 patient seen and examined in bed no acute distress, vital signs stable, dyspnea on 2 L of oxygen, tolerating antibiotics.  Discussed with RN.  Elevated D-dimer will order CT chest no PE.  Will dc home condition on dc stable.    DISCHARGE Follow Up Recommendations for labs and diagnostics: follow with pcp in One week      Reasons For Change In Medications and Indications for New Medications:prednisone      Day of Discharge     Vital Signs:  Temp:  [97.4 °F (36.3 °C)-97.8 °F (36.6 °C)] 97.8 °F (36.6 °C)  Heart Rate:  [71-89] 74  Resp:  [16-20] 18  BP: (101-129)/(43-66) 109/61  Flow (L/min):  [2-3] 2    Physical Exam   Constitutional:       General: She is not in acute distress.     Appearance: Normal  appearance, She is not toxic-appearing or diaphoretic.   HENT:      Head: Normocephalic.      Right Ear: External ear normal.      Left Ear: External ear normal.      Nose: Nose normal.      Mouth/Throat:      Mouth: Mucous membranes are moist.   Eyes:      General: No scleral icterus.        Right eye: No discharge.         Left eye: No discharge.      Extraocular Movements: Extraocular movements intact.      Conjunctiva/sclera: Conjunctivae normal.      Pupils: Pupils are equal, round, and reactive to light.   Cardiovascular:      Rate and Rhythm: Normal rate. Rhythm irregular.     Pulses: Normal pulses.      Heart sounds: Murmur heard.    Systolic murmur is present with a grade of 4/6.    No friction rub.   Pulmonary:      Effort: Pulmonary effort is normal.      Breath sounds: Normal breath sounds.   Abdominal:      General: Bowel sounds are normal.      Palpations: Abdomen is soft.   Musculoskeletal:         General: Normal range of motion.      Cervical back: Normal range of motion and neck supple.      Right lower leg: No edema.      Left lower leg: No edema.   Skin:     General: Skin is warm and dry.   Neurological:      Mental Status: She is alert.   Psychiatric:         Attention and Perception: Attention and perception normal. She is attentive.         Mood and Affect: nl       Speech: Speech normal.         Behavior: Behavior normal.         Thought Content: Thought content normal.         Cognition and Memory: Cognition and memory normal.         Judgment: Judgment is not impulsive or inappropriate.     Pertinent  and/or Most Recent Results     LAB RESULTS:      Lab 08/18/22  1021 08/17/22  1036 08/17/22  0123 08/16/22  0057 08/15/22  2218   WBC 13.60* 17.00*  --   --  15.82*   HEMOGLOBIN 8.6* 8.6*  --   --  10.1*   HEMATOCRIT 25.7* 25.5*  --   --  29.2*   PLATELETS 786* 671*  --   --  670*   NEUTROS ABS 12.60* 14.45*  --   --  13.55*   IMMATURE GRANS (ABS)  --   --   --   --  0.26*   LYMPHS ABS 0.70   --   --   --  0.95   MONOS ABS 0.30  --   --   --  0.93*   EOS ABS 0.00  --   --   --  0.08   MCV 86.5 86.9  --   --  86.4   PROCALCITONIN  --   --  0.11  --   --    LACTATE  --   --  0.7 0.8  --          Lab 08/18/22  1021 08/17/22  2354 08/17/22  1036 08/17/22  0123 08/15/22  2238   SODIUM 134*  --   --  134* 132*   POTASSIUM 3.3* 3.6  --  3.0* 3.8   CHLORIDE 97*  --   --  98 97*   CO2 24.0  --   --  22.0 21.4*   ANION GAP 13.0  --   --  14.0 13.6   BUN 5*  --   --  7* 9   CREATININE 0.38*  --   --  0.33* 0.62   EGFR 102.7  --   --  106.3 91.3   GLUCOSE 290*  --   --  92 92   CALCIUM 8.4*  --   --  7.9* 8.5*   IONIZED CALCIUM  --   --   --  1.15*  --    MAGNESIUM  --   --  1.6  --   --    HEMOGLOBIN A1C  --   --   --  5.2  --    TSH  --   --   --  2.790  --          Lab 08/18/22  1021 08/17/22  0123   TOTAL PROTEIN 6.1 5.5*   ALBUMIN 2.50* 2.20*   GLOBULIN 3.6 3.3   ALT (SGPT) 16 13   AST (SGOT) 29 23   BILIRUBIN <0.2 <0.2   ALK PHOS 33* 31*         Lab 08/17/22  1036 08/15/22  2238   PROBNP 1,680.0  --    TROPONIN T  --  <0.010         Lab 08/17/22  0123   CHOLESTEROL 101   LDL CHOL 58   HDL CHOL 28*   TRIGLYCERIDES 70             Brief Urine Lab Results  (Last result in the past 365 days)      Color   Clarity   Blood   Leuk Est   Nitrite   Protein   CREAT   Urine HCG        04/12/22 1638     Negative   Negative   Negative   Negative               Microbiology Results (last 10 days)     Procedure Component Value - Date/Time    Respiratory Culture - Sputum, Cough [[id number]] Collected: 08/18/22 0943    Lab Status: Preliminary result Specimen: Sputum from Cough Updated: 08/18/22 1013     Gram Stain Many (4+) WBCs per low power field      Rare (1+) Gram positive cocci      Rare (1+) Epithelial cells per low power field    Influenza Antigen, Rapid - Swab, Nasopharynx [[id number]]  (Normal) Collected: 08/17/22 1403    Lab Status: Final result Specimen: Swab from Nasopharynx Updated: 08/17/22 1512     Influenza A PCR  Not Detected     Influenza B PCR Not Detected    Legionella Antigen, Urine - Urine, Urine, Clean Catch [[id number]]  (Normal) Collected: 08/17/22 1313    Lab Status: Final result Specimen: Urine, Clean Catch Updated: 08/17/22 1423     LEGIONELLA ANTIGEN, URINE Negative    Respiratory Panel PCR w/COVID-19(SARS-CoV-2) JENNIFER/BRIAN/IFRAH/PAD/COR/MAD/CATHERINE In-House, NP Swab in UTM/VTM, 3-4 HR TAT - Swab, Nasopharynx [[id number]]  (Normal) Collected: 08/16/22 1744    Lab Status: Final result Specimen: Swab from Nasopharynx Updated: 08/16/22 2019     ADENOVIRUS, PCR Not Detected     Coronavirus 229E Not Detected     Coronavirus HKU1 Not Detected     Coronavirus NL63 Not Detected     Coronavirus OC43 Not Detected     COVID19 Not Detected     Human Metapneumovirus Not Detected     Human Rhinovirus/Enterovirus Not Detected     Influenza A PCR Not Detected     Influenza B PCR Not Detected     Parainfluenza Virus 1 Not Detected     Parainfluenza Virus 2 Not Detected     Parainfluenza Virus 3 Not Detected     Parainfluenza Virus 4 Not Detected     RSV, PCR Not Detected     Bordetella pertussis pcr Not Detected     Bordetella parapertussis PCR Not Detected     Chlamydophila pneumoniae PCR Not Detected     Mycoplasma pneumo by PCR Not Detected    Narrative:      In the setting of a positive respiratory panel with a viral infection PLUS a negative procalcitonin without other underlying concern for bacterial infection, consider observing off antibiotics or discontinuation of antibiotics and continue supportive care. If the respiratory panel is positive for atypical bacterial infection (Bordetella pertussis, Chlamydophila pneumoniae, or Mycoplasma pneumoniae), consider antibiotic de-escalation to target atypical bacterial infection.    Blood Culture - Blood, Hand, Right [[id number]]  (Normal) Collected: 08/16/22 0102    Lab Status: Preliminary result Specimen: Blood from Hand, Right Updated: 08/18/22 0716     Blood Culture No growth at 2 days     Blood Culture - Blood, Arm, Right [[id number]]  (Normal) Collected: 08/16/22 0033    Lab Status: Preliminary result Specimen: Blood from Arm, Right Updated: 08/18/22 0716     Blood Culture No growth at 2 days    COVID-19 and FLU A/B PCR - Swab, Nasopharynx [[id number]]  (Normal) Collected: 08/15/22 2033    Lab Status: Final result Specimen: Swab from Nasopharynx Updated: 08/15/22 2057     COVID19 Not Detected     Influenza A PCR Not Detected     Influenza B PCR Not Detected    Narrative:      Fact sheet for providers: https://www.fda.gov/media/035844/download    Fact sheet for patients: https://www.fda.gov/media/264701/download    Test performed by PCR.          XR Chest 2 View    Result Date: 8/15/2022  Impression: Left lower lobe airspace opacity atelectasis versus pneumonia versus scar. Probable emphysema. Atherosclerotic calcification of the thoracic aorta.  Electronically Signed By-Ashlee Cartagena MD On:8/15/2022 9:25 PM This report was finalized on [id number] by  Ashlee Cartagena MD.    CT Angiogram Chest Pulmonary Embolism    Result Date: 8/18/2022  Impression: No findings of pulmonary embolus. Dense consolidation left lower lobe. Pneumonia favored over neoplasm. Follow-up imaging is recommended with CT. Moderate emphysema. Small right pleural effusion. Cardiomegaly and severe coronary artery atherosclerotic calcification.    Electronically Signed By-Ashlee Cartagena MD On:8/18/2022 3:38 PM This report was finalized on [id number] by  Ashlee Cartagena MD.      Results for orders placed during the hospital encounter of 06/01/21    Duplex carotid ultrasound CAR    Interpretation Summary  · Proximal right internal carotid artery moderate stenosis.  · Proximal left internal carotid artery moderate stenosis.  · Left Vertebral: Retrograde flow noted.      Results for orders placed during the hospital encounter of 06/01/21    Duplex carotid ultrasound CAR    Interpretation Summary  · Proximal right internal carotid artery  moderate stenosis.  · Proximal left internal carotid artery moderate stenosis.  · Left Vertebral: Retrograde flow noted.      Results for orders placed during the hospital encounter of 08/15/22    Adult Transthoracic Echo Complete W/ Cont if Necessary Per Protocol    Interpretation Summary  · Calculated left ventricular EF = 62% Estimated left ventricular EF was in agreement with the calculated left ventricular EF.  · Left ventricular diastolic function is consistent with (grade II w/high LAP) pseudonormalization.  · Estimated right ventricular systolic pressure from tricuspid regurgitation is normal (<35 mmHg).      Labs Pending at Discharge:  Pending Labs     Order Current Status    Blood Culture - Blood, Arm, Right Preliminary result    Blood Culture - Blood, Hand, Right Preliminary result    Respiratory Culture - Sputum, Cough Preliminary result          Procedures Performed           Consults:   Consults     No orders found from 7/17/2022 to 8/16/2022.            Discharge Details        Discharge Medications      New Medications      Instructions Start Date   cefdinir 300 MG capsule  Commonly known as: OMNICEF   300 mg, Oral, 2 Times Daily      guaiFENesin 600 MG 12 hr tablet  Commonly known as: MUCINEX   600 mg, Oral, 2 Times Daily      guaiFENesin-codeine 100-10 MG/5ML liquid  Commonly known as: GUAIFENESIN AC   5 mL, Oral, Every 4 Hours PRN      predniSONE 10 MG (48) dose pack  Commonly known as: DELTASONE   30 mg po qd x 3 days 20 mg po qd x 3 days 10 mg po qd x 3 days         Changes to Medications      Instructions Start Date   Actemra 162 MG/0.9ML solution prefilled syringe  Generic drug: Tocilizumab  What changed: how much to take   162 mg, Subcutaneous, Every 21 Days   Start Date: August 24, 2022        Continue These Medications      Instructions Start Date   albuterol sulfate  (90 Base) MCG/ACT inhaler  Commonly known as: PROVENTIL HFA;VENTOLIN HFA;PROAIR HFA   2 puffs, Inhalation, Every 4  Hours PRN      aspirin 81 MG EC tablet   81 mg, Oral, Daily      azelastine 0.1 % nasal spray  Commonly known as: ASTELIN   1 spray, Nasal, 2 Times Daily, Use in each nostril as directed       budesonide-formoterol 160-4.5 MCG/ACT inhaler  Commonly known as: SYMBICORT   2 puffs, Inhalation, 2 Times Daily - RT      cycloSPORINE 0.05 % ophthalmic emulsion  Commonly known as: RESTASIS   1 drop, Both Eyes, 2 Times Daily      digoxin 250 MCG tablet  Commonly known as: LANOXIN   250 mcg, Oral, Every Night at Bedtime      escitalopram 10 MG tablet  Commonly known as: LEXAPRO   10 mg, Oral, Every Morning      ezetimibe 10 MG tablet  Commonly known as: ZETIA   10 mg, Oral, Every Night at Bedtime      fluticasone 50 MCG/ACT nasal spray  Commonly known as: FLONASE   2 sprays, Nasal, Every Morning      furosemide 20 MG tablet  Commonly known as: LASIX   10 mg, Oral, Every Morning      gabapentin 300 MG capsule  Commonly known as: NEURONTIN   600 mg, Oral, 3 Times Daily      hydrALAZINE 100 MG tablet  Commonly known as: APRESOLINE   100 mg, Oral, 3 Times Daily, Take dos       immune globulin (human) 1 GM/10ML solution infusion  Commonly known as: GAMMAGARD   Intravenous, See Admin Instructions, Patient receives monthly infusion on the first Thursday of every month. Last IVIG infusion per patient was 08/04/22.      levothyroxine 25 MCG tablet  Commonly known as: SYNTHROID, LEVOTHROID   25 mcg, Oral, Every Morning      losartan 25 MG tablet  Commonly known as: COZAAR   25 mg, Oral, Every Morning      metoprolol succinate XL 50 MG 24 hr tablet  Commonly known as: TOPROL-XL   50 mg, Oral, Every Morning      montelukast 10 MG tablet  Commonly known as: SINGULAIR   10 mg, Oral, Nightly      NIFEdipine XL 30 MG 24 hr tablet  Commonly known as: PROCARDIA XL   60 mg, Oral, 2 times daily      ondansetron 4 MG tablet  Commonly known as: Zofran   4 mg, Oral, Every 6 Hours PRN      pantoprazole 40 MG EC tablet  Commonly known as: PROTONIX    80 mg, Oral, Daily      potassium chloride 10 MEQ CR tablet   10 mEq, Oral, Daily      PROBIOTIC PO   1 capsule, Oral, Daily      simvastatin 40 MG tablet  Commonly known as: ZOCOR   40 mg, Oral, Nightly      spironolactone 25 MG tablet  Commonly known as: ALDACTONE   25 mg, Oral, Daily      tiotropium 18 MCG per inhalation capsule  Commonly known as: SPIRIVA   1 capsule, Inhalation, Every Morning, Use and bring dos       Vitamin D 50 MCG (2000 UT) capsule   2,000 Units, Oral, Daily, A             Allergies   Allergen Reactions   • Crestor [Rosuvastatin] Myalgia   • Lipitor [Atorvastatin] Myalgia   • Penicillins Hives   • Hctz [Hydrochlorothiazide] Rash         Discharge Disposition:   Home or Self Care    Diet:  Hospital:  Diet Order   Procedures   • Diet Regular         Discharge Activity:   Activity Instructions     Gradually Increase Activity Until at Pre-Hospitalization Level              CODE STATUS:  Code Status and Medical Interventions:   Ordered at: 08/16/22 1723     Level Of Support Discussed With:    Patient     Code Status (Patient has no pulse and is not breathing):    CPR (Attempt to Resuscitate)     Medical Interventions (Patient has pulse or is breathing):    Full Support         No future appointments.    Additional Instructions for the Follow-ups that You Need to Schedule     Discharge Follow-up with PCP   As directed       Currently Documented PCP:    Chris Pedro MD    PCP Phone Number:    [phone]     Follow Up Details: 1 week               Time spent on Discharge including face to face service:  34 minutes    This patient has been examined wearing appropriate Personal Protective Equipment and discussed with rn. 08/18/22      Signature: Electronically signed by Mitesh Benavides MD, 08/18/22, 5:59 PM EDT.

## 2022-08-18 NOTE — PLAN OF CARE
Goal Outcome Evaluation:   Patient has rested well throughout the shift with c/o pain and cough. PRN meds given. Patient is discharging home and son is picking her up. Started on abx and needs to follow up with PCP in one week. All belongings collected and IV removed. VSS, continue to monitor.

## 2022-08-18 NOTE — PLAN OF CARE
Goal Outcome Evaluation:           Progress: no change  Outcome Evaluation: Patient states that she is tired of being tired and just wants to be without cough. Patient in bed with call light in reach, able to make needs known.

## 2022-08-19 LAB — QT INTERVAL: 396 MS

## 2022-08-19 NOTE — CASE MANAGEMENT/SOCIAL WORK
Case Management Discharge Note      Final Note: Home    Provided Post Acute Provider List?: Refused  Refused Provider List Comment: Pt declined SNF/HH    Selected Continued Care - Discharged on 8/18/2022 Admission date: 8/15/2022 - Discharge disposition: Home or Self Care            Transportation Services  Private: Car    Final Discharge Disposition Code: 01 - home or self-care

## 2022-08-19 NOTE — OUTREACH NOTE
Prep Survey    Flowsheet Row Responses   [facility] facility patient discharged from? [facility]   Is LACE score < 7 ? No   Emergency Room discharge w/ pulse ox? No   Eligibility Readm Mgmt   Discharge diagnosis PNA   Does the patient have one of the following disease processes/diagnoses(primary or secondary)? COPD/Pneumonia   Does the patient have Home health ordered? No   Is there a DME ordered? No   Prep survey completed? Yes          NADIRA KERNS - Registered Nurse

## 2022-08-20 LAB
BACTERIA SPEC RESP CULT: NORMAL
GRAM STN SPEC: NORMAL

## 2022-08-21 LAB
BACTERIA SPEC AEROBE CULT: NORMAL
BACTERIA SPEC AEROBE CULT: NORMAL

## 2022-08-23 ENCOUNTER — READMISSION MANAGEMENT (OUTPATIENT)
Dept: CALL CENTER | Facility: HOSPITAL | Age: 77
End: 2022-08-23

## 2022-08-23 NOTE — OUTREACH NOTE
COPD/PN Week 1 Survey    Flowsheet Row Responses   [facility] patient discharged from? [facility]   Does the patient have one of the following disease processes/diagnoses(primary or secondary)? COPD/Pneumonia   Was the primary reason for admission: Pneumonia   Week 1 attempt successful? Yes   Call start time 1311   Call end time 1313   Meds reviewed with patient/caregiver? Yes   Is the patient having any side effects they believe may be caused by any medication additions or changes? No   Does the patient have all medications ordered at discharge? Yes   Is the patient taking all medications as directed (includes completed medication regime)? Yes   Does the patient have a primary care provider?  Yes   Does the patient have an appointment with their PCP or specialist within 7 days of discharge? Yes   Has the patient kept scheduled appointments due by today? N/A   Psychosocial issues? No   Did the patient receive a copy of their discharge instructions? Yes   Nursing interventions Reviewed instructions with patient   What is the patient's perception of their health status since discharge? Improving   Nursing Interventions Nurse provided patient education   If the patient is a current smoker, are they able to teach back resources for cessation? Not a smoker   Is the patient/caregiver able to teach back the hierarchy of who to call/visit for symptoms/problems? PCP, Specialist, Home health nurse, Urgent Care, ED, 911 Yes   Is the patient/caregiver able to teach back signs and symptoms of worsening condition: Fever/chills, Shortness of breath, Chest pain   Is the patient/caregiver able to teach back importance of completing antibiotic course of treatment? Yes   Week 1 call completed? Yes   Wrap up additional comments Per patient she is doing better. No questions or concerns.           KEVIN RAMÍREZ - Registered Nurse

## 2022-08-25 LAB — QT INTERVAL: 351 MS

## 2022-08-31 ENCOUNTER — READMISSION MANAGEMENT (OUTPATIENT)
Dept: CALL CENTER | Facility: HOSPITAL | Age: 77
End: 2022-08-31

## 2022-08-31 NOTE — OUTREACH NOTE
COPD/PN Week 2 Survey    Flowsheet Row Responses   [facility] patient discharged from? [facility]   Does the patient have one of the following disease processes/diagnoses(primary or secondary)? Pneumonia   Week 2 attempt successful? Yes   Call start time 1253   Call end time 1255   Discharge diagnosis PNA   Is patient permission given to speak with other caregiver? Yes   List who call center can speak with son   Meds reviewed with patient/caregiver? Yes   Is the patient taking all medications as directed (includes completed medication regime)? Yes   Comments regarding PCP f/u next week.   Has the patient kept scheduled appointments due by today? N/A   DME comments wearing home O2 @ 2L   Pulse Ox monitoring Intermittent   Pulse Ox device source Patient   O2 Sat comments 95% with O2   O2 Sat: education provided Sat levels, When to seek care, Monitoring frequency   Psychosocial issues? No   Comments pt reports feeling better.   What is the patient's perception of their health status since discharge? Returned to baseline/stable   Is the patient/caregiver able to teach back importance of completing antibiotic course of treatment? Yes   Week 2 call completed? Yes   Revoked No further contact(revokes)-requires comment   Graduated/Revoked comments stable   Wrap up additional comments Per patient she is doing better. No questions or concerns.           ALYSON CARLSON - Registered Nurse

## 2022-09-10 ENCOUNTER — APPOINTMENT (OUTPATIENT)
Dept: CT IMAGING | Facility: HOSPITAL | Age: 77
End: 2022-09-10

## 2022-09-10 ENCOUNTER — HOSPITAL ENCOUNTER (EMERGENCY)
Facility: HOSPITAL | Age: 77
Discharge: HOME | End: 2022-09-10
Attending: EMERGENCY MEDICINE | Admitting: EMERGENCY MEDICINE

## 2022-09-10 VITALS
OXYGEN SATURATION: 95 % | DIASTOLIC BLOOD PRESSURE: 62 MMHG | WEIGHT: 148 LBS | HEART RATE: 83 BPM | HEIGHT: 65 IN | SYSTOLIC BLOOD PRESSURE: 149 MMHG | RESPIRATION RATE: 16 BRPM | BODY MASS INDEX: 24.66 KG/M2 | TEMPERATURE: 97.5 F

## 2022-09-10 DIAGNOSIS — R53.1 WEAKNESS: ICD-10-CM

## 2022-09-10 DIAGNOSIS — R19.7 DIARRHEA, UNSPECIFIED TYPE: Primary | ICD-10-CM

## 2022-09-10 LAB
ALBUMIN SERPL-MCNC: 3.2 G/DL (ref 3.5–5.2)
ALBUMIN/GLOB SERPL: 0.7 G/DL
ALP SERPL-CCNC: 46 U/L (ref 39–117)
ALT SERPL W P-5'-P-CCNC: 7 U/L (ref 1–33)
ANION GAP SERPL CALCULATED.3IONS-SCNC: 14 MMOL/L (ref 5–15)
AST SERPL-CCNC: 18 U/L (ref 1–32)
BILIRUB SERPL-MCNC: 0.3 MG/DL (ref 0–1.2)
BILIRUB UR QL STRIP: NEGATIVE
BUN SERPL-MCNC: 8 MG/DL (ref 8–23)
BUN/CREAT SERPL: 8.6 (ref 7–25)
CALCIUM SPEC-SCNC: 9.3 MG/DL (ref 8.6–10.5)
CHLORIDE SERPL-SCNC: 97 MMOL/L (ref 98–107)
CLARITY UR: CLEAR
CO2 SERPL-SCNC: 23 MMOL/L (ref 22–29)
COLOR UR: YELLOW
CREAT SERPL-MCNC: 0.93 MG/DL (ref 0.57–1)
DEPRECATED RDW RBC AUTO: 51.6 FL (ref 37–54)
DIGOXIN SERPL-MCNC: <0.3 NG/ML (ref 0.6–1.2)
EGFRCR SERPLBLD CKD-EPI 2021: 63 ML/MIN/1.73
EOSINOPHIL # BLD MANUAL: 0.23 10*3/MM3 (ref 0–0.4)
EOSINOPHIL NFR BLD MANUAL: 2 % (ref 0.3–6.2)
ERYTHROCYTE [DISTWIDTH] IN BLOOD BY AUTOMATED COUNT: 16.3 % (ref 12.3–15.4)
GLOBULIN UR ELPH-MCNC: 4.3 GM/DL
GLUCOSE SERPL-MCNC: 108 MG/DL (ref 65–99)
GLUCOSE UR STRIP-MCNC: NEGATIVE MG/DL
HCT VFR BLD AUTO: 32 % (ref 34–46.6)
HGB BLD-MCNC: 10.7 G/DL (ref 12–15.9)
HGB UR QL STRIP.AUTO: NEGATIVE
KETONES UR QL STRIP: NEGATIVE
LEUKOCYTE ESTERASE UR QL STRIP.AUTO: NEGATIVE
LIPASE SERPL-CCNC: 27 U/L (ref 13–60)
LIPASE SERPL-CCNC: 27 U/L (ref 13–60)
LYMPHOCYTES # BLD MANUAL: 1.76 10*3/MM3 (ref 0.7–3.1)
LYMPHOCYTES NFR BLD MANUAL: 7 % (ref 5–12)
MAGNESIUM SERPL-MCNC: 1.6 MG/DL (ref 1.6–2.4)
MAGNESIUM SERPL-MCNC: 1.7 MG/DL (ref 1.6–2.4)
MCH RBC QN AUTO: 29.9 PG (ref 26.6–33)
MCHC RBC AUTO-ENTMCNC: 33.4 G/DL (ref 31.5–35.7)
MCV RBC AUTO: 89.3 FL (ref 79–97)
MONOCYTES # BLD: 0.82 10*3/MM3 (ref 0.1–0.9)
NEUTROPHILS # BLD AUTO: 8.89 10*3/MM3 (ref 1.7–7)
NEUTROPHILS NFR BLD MANUAL: 75 % (ref 42.7–76)
NEUTS BAND NFR BLD MANUAL: 1 % (ref 0–5)
NITRITE UR QL STRIP: NEGATIVE
PH UR STRIP.AUTO: 7 [PH] (ref 5–8)
PLAT MORPH BLD: NORMAL
PLATELET # BLD AUTO: 442 10*3/MM3 (ref 140–450)
PMV BLD AUTO: 7.9 FL (ref 6–12)
POTASSIUM SERPL-SCNC: 3.8 MMOL/L (ref 3.5–5.2)
PROT SERPL-MCNC: 7.5 G/DL (ref 6–8.5)
PROT UR QL STRIP: NEGATIVE
QT INTERVAL: 458 MS
RBC # BLD AUTO: 3.58 10*6/MM3 (ref 3.77–5.28)
RBC MORPH BLD: NORMAL
SARS-COV-2 RNA PNL SPEC NAA+PROBE: NOT DETECTED
SCAN SLIDE: NORMAL
SODIUM SERPL-SCNC: 134 MMOL/L (ref 136–145)
SP GR UR STRIP: 1.01 (ref 1–1.03)
TSH SERPL DL<=0.05 MIU/L-ACNC: 5.78 UIU/ML (ref 0.27–4.2)
UROBILINOGEN UR QL STRIP: NORMAL
VARIANT LYMPHS NFR BLD MANUAL: 15 % (ref 19.6–45.3)
WBC MORPH BLD: NORMAL
WBC NRBC COR # BLD: 11.7 10*3/MM3 (ref 3.4–10.8)

## 2022-09-10 PROCEDURE — 0 IOPAMIDOL PER 1 ML: Performed by: EMERGENCY MEDICINE

## 2022-09-10 PROCEDURE — 74177 CT ABD & PELVIS W/CONTRAST: CPT

## 2022-09-10 PROCEDURE — 87635 SARS-COV-2 COVID-19 AMP PRB: CPT | Performed by: EMERGENCY MEDICINE

## 2022-09-10 PROCEDURE — 83690 ASSAY OF LIPASE: CPT | Performed by: EMERGENCY MEDICINE

## 2022-09-10 PROCEDURE — 93005 ELECTROCARDIOGRAM TRACING: CPT | Performed by: EMERGENCY MEDICINE

## 2022-09-10 PROCEDURE — 25010000002 ONDANSETRON PER 1 MG: Performed by: EMERGENCY MEDICINE

## 2022-09-10 PROCEDURE — 80162 ASSAY OF DIGOXIN TOTAL: CPT | Performed by: EMERGENCY MEDICINE

## 2022-09-10 PROCEDURE — 99284 EMERGENCY DEPT VISIT MOD MDM: CPT

## 2022-09-10 PROCEDURE — 84443 ASSAY THYROID STIM HORMONE: CPT | Performed by: EMERGENCY MEDICINE

## 2022-09-10 PROCEDURE — 36415 COLL VENOUS BLD VENIPUNCTURE: CPT | Performed by: EMERGENCY MEDICINE

## 2022-09-10 PROCEDURE — 96374 THER/PROPH/DIAG INJ IV PUSH: CPT

## 2022-09-10 PROCEDURE — 81003 URINALYSIS AUTO W/O SCOPE: CPT | Performed by: EMERGENCY MEDICINE

## 2022-09-10 PROCEDURE — 85007 BL SMEAR W/DIFF WBC COUNT: CPT | Performed by: EMERGENCY MEDICINE

## 2022-09-10 PROCEDURE — 80053 COMPREHEN METABOLIC PANEL: CPT | Performed by: EMERGENCY MEDICINE

## 2022-09-10 PROCEDURE — 85025 COMPLETE CBC W/AUTO DIFF WBC: CPT | Performed by: EMERGENCY MEDICINE

## 2022-09-10 PROCEDURE — 83735 ASSAY OF MAGNESIUM: CPT | Performed by: EMERGENCY MEDICINE

## 2022-09-10 RX ORDER — SODIUM CHLORIDE 0.9 % (FLUSH) 0.9 %
10 SYRINGE (ML) INJECTION AS NEEDED
Status: DISCONTINUED | OUTPATIENT
Start: 2022-09-10 | End: 2022-09-11 | Stop reason: HOSPADM

## 2022-09-10 RX ORDER — ONDANSETRON 2 MG/ML
4 INJECTION INTRAMUSCULAR; INTRAVENOUS ONCE
Status: COMPLETED | OUTPATIENT
Start: 2022-09-10 | End: 2022-09-10

## 2022-09-10 RX ADMIN — SODIUM CHLORIDE, POTASSIUM CHLORIDE, SODIUM LACTATE AND CALCIUM CHLORIDE 1000 ML: 600; 310; 30; 20 INJECTION, SOLUTION INTRAVENOUS at 17:32

## 2022-09-10 RX ADMIN — IOPAMIDOL 80 ML: 755 INJECTION, SOLUTION INTRAVENOUS at 19:51

## 2022-09-10 RX ADMIN — ONDANSETRON 4 MG: 2 INJECTION INTRAMUSCULAR; INTRAVENOUS at 17:38

## 2022-09-10 NOTE — ED NOTES
Took patient to CT 2 by stretcher and back, and hooked up O2 2 L from tank to wall as well as heart monitor, BP cuff, and pulse ox.

## 2022-09-10 NOTE — ED NOTES
Diarrhea started this AM. Pt got stuck on toilet. Used [facility] to contact son. [facility] sent ambulance. No other complaints. No fatigue, no generalized weakness.

## 2022-09-11 NOTE — ED PROVIDER NOTES
Subjective   PIT    Chief complaint diarrhea weakness unable to get off the commode    History of present illness this is a 77-year-old female who states she had diarrhea developed this morning moderate degree with some mild abdominal cramping nonbloody.  Nausea but no vomiting.  No ill exposures  or foreign travels.  Patient has had recent hospitalization for pneumonia in August.  She has not had antibiotics since that time no urinary complaints.  She states that pain was cramping and nonradiating.  Nothing really made it better or worse and intermittent.  Patient states she went to the commode and had significant diarrhea while she was having bowel movement on the toilet she became somewhat pale sweaty and felt hot all over like she might faint.  She could not get up off the commode EMS was called and they transported to the hospital hemodynamically stable in route.  She states she feels better currently.  No black or bloody stool.  No one at home with similar illness no other complaints or associated symptoms there was no chest pain neck arm jaw pain there was no headache vision change speech difficulty paralysis.          Review of Systems   Constitutional: Positive for diaphoresis. Negative for chills and fever.   HENT: Negative for congestion and sinus pressure.    Eyes: Negative for photophobia and visual disturbance.   Respiratory: Negative for chest tightness and shortness of breath.    Cardiovascular: Negative for chest pain, palpitations and leg swelling.   Gastrointestinal: Positive for abdominal pain and diarrhea. Negative for blood in stool and vomiting.   Endocrine: Negative for cold intolerance and heat intolerance.   Genitourinary: Negative for difficulty urinating and dysuria.   Musculoskeletal: Negative for back pain and neck pain.   Skin: Positive for pallor. Negative for color change and rash.   Neurological: Negative for dizziness, facial asymmetry, speech difficulty and headaches.    Psychiatric/Behavioral: Negative for agitation and confusion.       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    • Neuropathy    • Stricture of artery (HCC) 2021       Allergies   Allergen Reactions   • Crestor [Rosuvastatin] Myalgia   • Lipitor [Atorvastatin] Myalgia   • Penicillins Hives   • Hctz [Hydrochlorothiazide] Rash       Past Surgical History:   Procedure Laterality Date   • CAROTID SUBCLAVIAN BYPASS Left 2021    Procedure: CAROTID SUBCLAVIAN BYPASS;  Surgeon: Navid Hassan MD;  Location: [facility] MAIN OR;  Service: Vascular;  Laterality: Left;   •  SECTION     • EYE SURGERY      cat ext   • HARDWARE REMOVAL Right     knee   • HYSTERECTOMY      still has ovaries   • KIDNEY SURGERY      stent placed    • KNEE ARTHROSCOPY Right    • LAPAROSCOPIC CHOLECYSTECTOMY         Family History   Problem Relation Age of Onset   • No Known Problems Mother    • No Known Problems Father        Social History     Socioeconomic History   • Marital status:    Tobacco Use   • Smoking status: Former Smoker   Substance and Sexual Activity   • Alcohol use: Yes     Comment: social   • Drug use: Not Currently     Prior to Admission medications    Medication Sig Start Date End Date Taking? Authorizing Provider   albuterol sulfate  (90 Base) MCG/ACT inhaler Inhale 2 puffs Every 4 (Four) Hours As Needed for Wheezing.    ProviderPurvi MD   aspirin 81 MG EC tablet Take 81 mg by mouth Daily.    ProviderPurvi MD   azelastine (ASTELIN) 0.1 % nasal spray 1 spray into the nostril(s) as directed by provider 2 (Two) Times a Day. Use in each nostril as directed    Purvi Ko MD    budesonide-formoterol (SYMBICORT) 160-4.5 MCG/ACT inhaler Inhale 2 puffs 2 (Two) Times a Day.    Purvi Ko MD   cefdinir (OMNICEF) 300 MG capsule Take 1 capsule by mouth 2 (Two) Times a Day. 8/18/22   Mitesh Benavides MD   Cholecalciferol (Vitamin D) 50 MCG (2000 UT) capsule Take 2,000 Units by mouth Daily. A    Purvi Ko MD   cycloSPORINE (RESTASIS) 0.05 % ophthalmic emulsion Administer 1 drop to both eyes 2 (Two) Times a Day.    Purvi Ko MD   digoxin (LANOXIN) 250 MCG tablet Take 250 mcg by mouth every night at bedtime.    Purvi Ko MD   escitalopram (LEXAPRO) 10 MG tablet Take 10 mg by mouth Every Morning.    Purvi Ko MD   ezetimibe (ZETIA) 10 MG tablet Take 10 mg by mouth every night at bedtime.    Purvi Ko MD   fluticasone (FLONASE) 50 MCG/ACT nasal spray 2 sprays into the nostril(s) as directed by provider Every Morning.    Purvi Ko MD   furosemide (LASIX) 20 MG tablet Take 10 mg by mouth Every Morning.    Purvi Ko MD   gabapentin (NEURONTIN) 300 MG capsule Take 600 mg by mouth 3 (Three) Times a Day.    Purvi Ko MD   guaiFENesin (MUCINEX) 600 MG 12 hr tablet Take 1 tablet by mouth 2 (Two) Times a Day. 8/18/22   Mitesh Benavides MD   guaiFENesin-codeine (GUAIFENESIN AC) 100-10 MG/5ML liquid Take 5 mL by mouth Every 4 (Four) Hours As Needed for Cough. 8/18/22   Mitesh Benavides MD   hydrALAZINE (APRESOLINE) 100 MG tablet Take 100 mg by mouth 3 (Three) Times a Day. Take dos    Purvi Ko MD   immune globulin, human, (GAMMAGARD) 1 GM/10ML solution infusion Infuse  into a venous catheter See Admin Instructions. Patient receives monthly infusion on the first Thursday of every month. Last IVIG infusion per patient was 08/04/22.    Purvi Ko MD   levothyroxine (SYNTHROID, LEVOTHROID) 25 MCG tablet Take 25 mcg by mouth Every Morning.    Purvi Ko MD    losartan (COZAAR) 25 MG tablet Take 25 mg by mouth Every Morning.    Purvi Ko MD   metoprolol succinate XL (TOPROL-XL) 50 MG 24 hr tablet Take 50 mg by mouth Every Morning.    Purvi Ko MD   montelukast (SINGULAIR) 10 MG tablet Take 10 mg by mouth Every Night.    Purvi Ko MD   NIFEdipine XL (PROCARDIA XL) 30 MG 24 hr tablet Take 60 mg by mouth 2 (two) times a day.    Purvi Ko MD   ondansetron (Zofran) 4 MG tablet Take 1 tablet by mouth Every 6 (Six) Hours As Needed for Nausea or Vomiting. 7/5/21   Stefano Hubbard DO   pantoprazole (PROTONIX) 40 MG EC tablet Take 80 mg by mouth Daily.    Purvi Ko MD   potassium chloride 10 MEQ CR tablet Take 10 mEq by mouth Daily.    Purvi Ko MD   predniSONE (DELTASONE) 10 MG tablet take 3 tablets by mouth once daily x 3 days then 2 daily x 3 days then 1 daily x 3 days 8/18/22   Mitesh Benavides MD   Probiotic Product (PROBIOTIC PO) Take 1 capsule by mouth Daily.    Purvi Ko MD   simvastatin (ZOCOR) 40 MG tablet Take 40 mg by mouth Every Night.    Purvi Ko MD   spironolactone (ALDACTONE) 25 MG tablet Take 25 mg by mouth Daily.    Purvi Ko MD   tiotropium (SPIRIVA) 18 MCG per inhalation capsule Place 1 capsule into inhaler and inhale Every Morning. Use and bring dos    Purvi Ko MD   Tocilizumab (Actemra) 162 MG/0.9ML solution prefilled syringe Inject 0.9 mL under the skin into the appropriate area as directed Every 21 (Twenty-One) Days. 8/24/22   Mitesh Benavides MD           Objective   Physical Exam  Constitutional is a 77-year-old female awake alert no distress temperature was 97.5 blood pressure 123/82 heart rate 57 respiration 16 sats 99% 2 L of oxygen which she wears at home.  HEENT extraocular muscles are intact pupils equal round reactive no photophobia disks are sharp mouth clear.  Neck supple no adenopathy no JVD no bruits no  meningeal signs.  Lungs clear no retractions.  Back no CVA tenderness.  Heart regular without murmur.  Abdomen soft nontender nondistended good bowel sounds no peritoneal findings or is no pulsatile masses.  Extremities pulses are equal throughout upper and lower extremities no edema no cords no Homans' sign no evidence of a DVT.  Extremities pulses are equal throughout upper and lower extremities no edema no cords no Homans' sign no evidence of DVT.  Skin is warm and dry without rashes or cellulitic changes.  Neurologic awake alert orientated x4 no facial asymmetry normal speech tongue soft palate normal no drift the arms or legs there is no focal weakness.  Procedures           ED Course       Results for orders placed or performed during the hospital encounter of 09/10/22   COVID-19,CEPHEID/PRISCA,COR/IFRAH/PAD/CARRIE IN-HOUSE(OR EMERGENT/ADD-ON),NP SWAB IN TRANSPORT MEDIA 3-4 HR TAT, RT-PCR - Swab, Nasopharynx    Specimen: Nasopharynx; Swab   Result Value Ref Range    COVID19 Not Detected Not Detected - Ref. Range   Comprehensive Metabolic Panel    Specimen: Arm, Right; Blood   Result Value Ref Range    Glucose 108 (H) 65 - 99 mg/dL    BUN 8 8 - 23 mg/dL    Creatinine 0.93 0.57 - 1.00 mg/dL    Sodium 134 (L) 136 - 145 mmol/L    Potassium 3.8 3.5 - 5.2 mmol/L    Chloride 97 (L) 98 - 107 mmol/L    CO2 23.0 22.0 - 29.0 mmol/L    Calcium 9.3 8.6 - 10.5 mg/dL    Total Protein 7.5 6.0 - 8.5 g/dL    Albumin 3.20 (L) 3.50 - 5.20 g/dL    ALT (SGPT) 7 1 - 33 U/L    AST (SGOT) 18 1 - 32 U/L    Alkaline Phosphatase 46 39 - 117 U/L    Total Bilirubin 0.3 0.0 - 1.2 mg/dL    Globulin 4.3 gm/dL    A/G Ratio 0.7 g/dL    BUN/Creatinine Ratio 8.6 7.0 - 25.0    Anion Gap 14.0 5.0 - 15.0 mmol/L    eGFR 63.0 >60.0 mL/min/1.73   Urinalysis With Microscopic If Indicated (No Culture) - Urine, Catheter    Specimen: Urine, Catheter   Result Value Ref Range    Color, UA Yellow Yellow, Straw    Appearance, UA Clear Clear    pH, UA 7.0 5.0 - 8.0     Specific Gravity, UA 1.009 1.005 - 1.030    Glucose, UA Negative Negative    Ketones, UA Negative Negative    Bilirubin, UA Negative Negative    Blood, UA Negative Negative    Protein, UA Negative Negative    Leuk Esterase, UA Negative Negative    Nitrite, UA Negative Negative    Urobilinogen, UA 0.2 E.U./dL 0.2 - 1.0 E.U./dL   Lipase    Specimen: Blood   Result Value Ref Range    Lipase 27 13 - 60 U/L   Magnesium    Specimen: Blood   Result Value Ref Range    Magnesium 1.7 1.6 - 2.4 mg/dL   TSH    Specimen: Arm, Right; Blood   Result Value Ref Range    TSH 5.780 (H) 0.270 - 4.200 uIU/mL   Digoxin Level    Specimen: Arm, Right; Blood   Result Value Ref Range    Digoxin <0.30 (L) 0.60 - 1.20 ng/mL   CBC Auto Differential    Specimen: Blood   Result Value Ref Range    WBC 11.70 (H) 3.40 - 10.80 10*3/mm3    RBC 3.58 (L) 3.77 - 5.28 10*6/mm3    Hemoglobin 10.7 (L) 12.0 - 15.9 g/dL    Hematocrit 32.0 (L) 34.0 - 46.6 %    MCV 89.3 79.0 - 97.0 fL    MCH 29.9 26.6 - 33.0 pg    MCHC 33.4 31.5 - 35.7 g/dL    RDW 16.3 (H) 12.3 - 15.4 %    RDW-SD 51.6 37.0 - 54.0 fl    MPV 7.9 6.0 - 12.0 fL    Platelets 442 140 - 450 10*3/mm3   Scan Slide    Specimen: Blood   Result Value Ref Range    Scan Slide     Lipase    Specimen: Arm, Right; Blood   Result Value Ref Range    Lipase 27 13 - 60 U/L   Magnesium    Specimen: Arm, Right; Blood   Result Value Ref Range    Magnesium 1.6 1.6 - 2.4 mg/dL   Manual Differential    Specimen: Blood   Result Value Ref Range    Neutrophil % 75.0 42.7 - 76.0 %    Lymphocyte % 15.0 (L) 19.6 - 45.3 %    Monocyte % 7.0 5.0 - 12.0 %    Eosinophil % 2.0 0.3 - 6.2 %    Bands %  1.0 0.0 - 5.0 %    Neutrophils Absolute 8.89 (H) 1.70 - 7.00 10*3/mm3    Lymphocytes Absolute 1.76 0.70 - 3.10 10*3/mm3    Monocytes Absolute 0.82 0.10 - 0.90 10*3/mm3    Eosinophils Absolute 0.23 0.00 - 0.40 10*3/mm3    RBC Morphology Normal Normal    WBC Morphology Normal Normal    Platelet Morphology Normal Normal   ECG 12 Lead    Result Value Ref Range    QT Interval 458 ms     CT Abdomen Pelvis With Contrast    Result Date: 9/10/2022   1. No acute disease in the abdomen or pelvis. 2. Small consolidating pulmonary infiltrate in the posterior superior aspect left lower lobe along has improved significantly since previous study performed on August 18, 2022. 3. Dilatation of bile ducts unchanged since previous study. Correlation with the liver enzymes would be helpful to further evaluate this abnormality. The patient is status post cholecystectomy. No evidence of stone or mass in the common bile duct. No evidence of mass in the head of the pancreas.  Electronically Signed By-Castillo Monteiro MD On:9/10/2022 8:40 PM This report was finalized on [id number] by  Castillo Monteiro MD.    Medications   sodium chloride 0.9 % flush 10 mL (has no administration in time range)   lactated ringers bolus 1,000 mL (1,000 mL Intravenous New Bag 9/10/22 1732)   ondansetron (ZOFRAN) injection 4 mg (4 mg Intravenous Given 9/10/22 1738)   iopamidol (ISOVUE-370) 76 % injection 100 mL (80 mL Intravenous Given 9/10/22 1951)          EKG my interpretation normal sinus rhythm rate of 62 normal axis hypertrophy QTC of 465 no acute change from previous EKG otherwise unremarkable                                  MDM  Number of Diagnoses or Management Options  Diarrhea, unspecified type: new and requires workup  Weakness: new and requires workup  Diagnosis management comments: Medical decision making.  Patient IV established placed on the monitor which shows sinus rhythm on my review given a liter lactated Ringer's for Zofran IV.  EKG obtained because of the sweatiness and unable to get up showed a sinus rhythm without any acute findings generally no significant change from previous this reviewed by me.  Patient underwent laboratory evaluation.  COVID-19 negative chemistries unremarkable sodium 134.  Liver enzymes unremarkable urine was negative lipase normal  magnesium normal TSH 5.780 digoxin level less than 0.3 CBC unremarkable white count 11.7 hemoglobin 10.7.  CT scan abdomen pelvis no active disease in the abdomen pelvis small consolidated pulmonary infiltrate in the posterior superior aspect of the left lower lung all has improved significantly since the last CT in August.  Dilatation of the bile ducts unchanged from the previous 1.  She had a cholecystectomy.  No other acute findings radiology report and I have reviewed the CT as well.  Patient had no diarrhea in the ER she was unable to provide a stool sample and she was here for several hours she had no dysrhythmia.  Her exam remained unchanged abdomen and soft without tenderness she remained hemodynamically stable I see no evidence of a stroke no evidence of acute cardiac ischemia DVT pulmonary embolism or aortic dissection.  No evidence of acute intra-abdominal process ruptured aneurysm or ischemic bowel.  This is not a complete list of all possibilities that can cause her symptoms.  But she is looking well.  She desires to go home and I think that is reasonable.  We talked about what to return for.  How to treat this she voiced understanding she was discharged home for outpatient follow-up.  Stable unremarkable improved ER course       Amount and/or Complexity of Data Reviewed  Clinical lab tests: reviewed  Tests in the radiology section of CPT®: reviewed  Tests in the medicine section of CPT®: reviewed    Risk of Complications, Morbidity, and/or Mortality  Presenting problems: high  Diagnostic procedures: high  Management options: high    Patient Progress  Patient progress: stable      Final diagnoses:   Diarrhea, unspecified type   Weakness       ED Disposition  ED Disposition     ED Disposition   Discharge    Condition   Stable    Comment   --             Chris Pedro MD  [address] [place] [address] [place] [id number]  [phone]    In 2 days           Medication List      No changes were made to your  prescriptions during this visit.          Sandro Isaac MD  09/11/22 [id number]

## 2022-09-11 NOTE — DISCHARGE INSTRUCTIONS
Clear liquid diet tonight.  No dairy.  Rest plenty of fluids.  Return for reoccurrence of symptoms black or bloody stool bloody diarrhea vomiting vomiting blood cannot hold anything down severe abdominal pain dizziness passing out chest pain shortness of breath altered mental status or any other new or worse problems or concerns return immediately to the ER

## (undated) DEVICE — SUT VIC 3/0 SH 27IN J416H

## (undated) DEVICE — IRRIGATOR BULB ASEPTO 60CC STRL

## (undated) DEVICE — SLV SCD CALF HEMOFORCE DVT THERP REPROC MD

## (undated) DEVICE — SUT SILK 3/0 SH CR8 18IN C013D

## (undated) DEVICE — SUT VIC 2/0 CT1 36IN

## (undated) DEVICE — SOL NACL 0.9PCT 1000ML

## (undated) DEVICE — SUT PROLN 6/0 BV1 D/A 30IN 8709H

## (undated) DEVICE — APPL COTN TP PLSTC 6IN STRL LF PK/2

## (undated) DEVICE — 3M™ STERI-STRIP™ REINFORCED ADHESIVE SKIN CLOSURES, R1547, 1/2 IN X 4 IN (12 MM X 100 MM), 6 STRIPS/ENVELOPE: Brand: 3M™ STERI-STRIP™

## (undated) DEVICE — TP UMB COTN 1/8X18 TU10T

## (undated) DEVICE — SOL NS 500ML

## (undated) DEVICE — VASCULAR CDS: Brand: MEDLINE INDUSTRIES, INC.

## (undated) DEVICE — PK ATS CUST W CARDIOTOMY RESEVOIR

## (undated) DEVICE — Device

## (undated) DEVICE — SOL IRRIG NACL 1000ML

## (undated) DEVICE — COVADERM: Brand: DEROYAL

## (undated) DEVICE — KT SURG TURNOVER 050

## (undated) DEVICE — GLV SURG DERMASSURE GRN LF PF 8.5

## (undated) DEVICE — CATHETER,URETHRAL,REDRUBBER,STERILE,20FR: Brand: MEDLINE

## (undated) DEVICE — MICRO TIP WIPE: Brand: DEVON

## (undated) DEVICE — GOWN,REINFRCE,POLY,SIRUS,BREATH SLV,XXLG: Brand: MEDLINE

## (undated) DEVICE — SUT PROLN SURGILENE 5.0 24IN BLU 9702H

## (undated) DEVICE — UNDYED BRAIDED (POLYGLACTIN 910), SYNTHETIC ABSORBABLE SUTURE: Brand: COATED VICRYL

## (undated) DEVICE — LP VESL MINI RED 2PK

## (undated) DEVICE — SUT PROLN 6/0 C1 D/A 30IN 8706H

## (undated) DEVICE — ADHS LIQ MASTISOL 2/3ML

## (undated) DEVICE — CVR PROB 96IN LF STRL

## (undated) DEVICE — SYS PERFUS SEP PLATLT W TIPS CUST

## (undated) DEVICE — PENCL HND ROCKRSWTCH HOLSTR EZ CLEAN TP CRD 10FT

## (undated) DEVICE — GLV SURG BIOGEL LTX PF 8 1/2

## (undated) DEVICE — SUT VIC 4/0 SH 27IN J415H

## (undated) DEVICE — 3M™ STERI-DRAPE™ INSTRUMENT POUCH 1018: Brand: STERI-DRAPE™